# Patient Record
Sex: MALE | URBAN - METROPOLITAN AREA
[De-identification: names, ages, dates, MRNs, and addresses within clinical notes are randomized per-mention and may not be internally consistent; named-entity substitution may affect disease eponyms.]

---

## 2018-06-12 ENCOUNTER — INPATIENT (INPATIENT)
Facility: HOSPITAL | Age: 53
LOS: 2 days | Discharge: HOME | End: 2018-06-15
Attending: HOSPITALIST | Admitting: HOSPITALIST
Payer: COMMERCIAL

## 2018-06-12 VITALS
HEART RATE: 102 BPM | WEIGHT: 289.91 LBS | HEIGHT: 73 IN | SYSTOLIC BLOOD PRESSURE: 216 MMHG | OXYGEN SATURATION: 97 % | TEMPERATURE: 98 F | RESPIRATION RATE: 18 BRPM | DIASTOLIC BLOOD PRESSURE: 137 MMHG

## 2018-06-12 DIAGNOSIS — I21.4 NON-ST ELEVATION (NSTEMI) MYOCARDIAL INFARCTION: ICD-10-CM

## 2018-06-12 DIAGNOSIS — R06.02 SHORTNESS OF BREATH: ICD-10-CM

## 2018-06-12 DIAGNOSIS — I10 ESSENTIAL (PRIMARY) HYPERTENSION: ICD-10-CM

## 2018-06-12 DIAGNOSIS — J45.909 UNSPECIFIED ASTHMA, UNCOMPLICATED: ICD-10-CM

## 2018-06-12 LAB
ALBUMIN SERPL ELPH-MCNC: 3 G/DL — LOW (ref 3.3–5)
ALP SERPL-CCNC: 55 U/L — SIGNIFICANT CHANGE UP (ref 40–120)
ALT FLD-CCNC: 68 U/L — SIGNIFICANT CHANGE UP (ref 12–78)
ANION GAP SERPL CALC-SCNC: 7 MMOL/L — SIGNIFICANT CHANGE UP (ref 5–17)
ANISOCYTOSIS BLD QL: SLIGHT — SIGNIFICANT CHANGE UP
APTT BLD: 25.5 SEC — LOW (ref 27.5–37.4)
APTT BLD: 37.9 SEC — HIGH (ref 27.5–37.4)
AST SERPL-CCNC: 43 U/L — HIGH (ref 15–37)
BASOPHILS # BLD AUTO: 0 K/UL — SIGNIFICANT CHANGE UP (ref 0–0.2)
BASOPHILS NFR BLD AUTO: 0 % — SIGNIFICANT CHANGE UP (ref 0–2)
BILIRUB SERPL-MCNC: 1.1 MG/DL — SIGNIFICANT CHANGE UP (ref 0.2–1.2)
BUN SERPL-MCNC: 23 MG/DL — SIGNIFICANT CHANGE UP (ref 7–23)
CALCIUM SERPL-MCNC: 8.2 MG/DL — LOW (ref 8.5–10.1)
CHLORIDE SERPL-SCNC: 104 MMOL/L — SIGNIFICANT CHANGE UP (ref 96–108)
CK SERPL-CCNC: 177 U/L — SIGNIFICANT CHANGE UP (ref 26–308)
CO2 SERPL-SCNC: 26 MMOL/L — SIGNIFICANT CHANGE UP (ref 22–31)
CREAT SERPL-MCNC: 1.63 MG/DL — HIGH (ref 0.5–1.3)
DACRYOCYTES BLD QL SMEAR: SLIGHT — SIGNIFICANT CHANGE UP
EOSINOPHIL # BLD AUTO: 0 K/UL — SIGNIFICANT CHANGE UP (ref 0–0.5)
EOSINOPHIL NFR BLD AUTO: 0 % — SIGNIFICANT CHANGE UP (ref 0–6)
GLUCOSE BLDC GLUCOMTR-MCNC: 145 MG/DL — HIGH (ref 70–99)
GLUCOSE BLDC GLUCOMTR-MCNC: 162 MG/DL — HIGH (ref 70–99)
GLUCOSE BLDC GLUCOMTR-MCNC: 179 MG/DL — HIGH (ref 70–99)
GLUCOSE SERPL-MCNC: 123 MG/DL — HIGH (ref 70–99)
HBA1C BLD-MCNC: 6.4 % — HIGH (ref 4–5.6)
HCT VFR BLD CALC: 40 % — SIGNIFICANT CHANGE UP (ref 39–50)
HCT VFR BLD CALC: 40.4 % — SIGNIFICANT CHANGE UP (ref 39–50)
HGB BLD-MCNC: 13.3 G/DL — SIGNIFICANT CHANGE UP (ref 13–17)
HGB BLD-MCNC: 13.4 G/DL — SIGNIFICANT CHANGE UP (ref 13–17)
INR BLD: 1.09 RATIO — SIGNIFICANT CHANGE UP (ref 0.88–1.16)
LYMPHOCYTES # BLD AUTO: 0.85 K/UL — LOW (ref 1–3.3)
LYMPHOCYTES # BLD AUTO: 7 % — LOW (ref 13–44)
MANUAL SMEAR VERIFICATION: SIGNIFICANT CHANGE UP
MCHC RBC-ENTMCNC: 29.4 PG — SIGNIFICANT CHANGE UP (ref 27–34)
MCHC RBC-ENTMCNC: 29.6 PG — SIGNIFICANT CHANGE UP (ref 27–34)
MCHC RBC-ENTMCNC: 32.9 GM/DL — SIGNIFICANT CHANGE UP (ref 32–36)
MCHC RBC-ENTMCNC: 33.5 GM/DL — SIGNIFICANT CHANGE UP (ref 32–36)
MCV RBC AUTO: 88.5 FL — SIGNIFICANT CHANGE UP (ref 80–100)
MCV RBC AUTO: 89.2 FL — SIGNIFICANT CHANGE UP (ref 80–100)
MONOCYTES # BLD AUTO: 1.09 K/UL — HIGH (ref 0–0.9)
MONOCYTES NFR BLD AUTO: 9 % — SIGNIFICANT CHANGE UP (ref 2–14)
NEUTROPHILS # BLD AUTO: 10.03 K/UL — HIGH (ref 1.8–7.4)
NEUTROPHILS NFR BLD AUTO: 81 % — HIGH (ref 43–77)
NEUTS BAND # BLD: 2 % — SIGNIFICANT CHANGE UP (ref 0–8)
NRBC # BLD: 0 /100 WBCS — SIGNIFICANT CHANGE UP (ref 0–0)
NRBC # BLD: 0 /100 — SIGNIFICANT CHANGE UP (ref 0–0)
NRBC # BLD: SIGNIFICANT CHANGE UP /100 WBCS (ref 0–0)
NT-PROBNP SERPL-SCNC: HIGH PG/ML (ref 0–125)
PLAT MORPH BLD: NORMAL — SIGNIFICANT CHANGE UP
PLATELET # BLD AUTO: 242 K/UL — SIGNIFICANT CHANGE UP (ref 150–400)
PLATELET # BLD AUTO: 253 K/UL — SIGNIFICANT CHANGE UP (ref 150–400)
POIKILOCYTOSIS BLD QL AUTO: SLIGHT — SIGNIFICANT CHANGE UP
POLYCHROMASIA BLD QL SMEAR: SLIGHT — SIGNIFICANT CHANGE UP
POTASSIUM SERPL-MCNC: 3.8 MMOL/L — SIGNIFICANT CHANGE UP (ref 3.5–5.3)
POTASSIUM SERPL-SCNC: 3.8 MMOL/L — SIGNIFICANT CHANGE UP (ref 3.5–5.3)
PROT SERPL-MCNC: 6.3 GM/DL — SIGNIFICANT CHANGE UP (ref 6–8.3)
PROTHROM AB SERPL-ACNC: 11.8 SEC — SIGNIFICANT CHANGE UP (ref 9.8–12.7)
RBC # BLD: 4.52 M/UL — SIGNIFICANT CHANGE UP (ref 4.2–5.8)
RBC # BLD: 4.53 M/UL — SIGNIFICANT CHANGE UP (ref 4.2–5.8)
RBC # FLD: 14.6 % — HIGH (ref 10.3–14.5)
RBC # FLD: 14.7 % — HIGH (ref 10.3–14.5)
RBC BLD AUTO: ABNORMAL
SODIUM SERPL-SCNC: 137 MMOL/L — SIGNIFICANT CHANGE UP (ref 135–145)
TROPONIN I SERPL-MCNC: 0.08 NG/ML — HIGH (ref 0.01–0.04)
TROPONIN I SERPL-MCNC: 0.08 NG/ML — HIGH (ref 0.01–0.04)
TROPONIN I SERPL-MCNC: 0.12 NG/ML — HIGH (ref 0.01–0.04)
TROPONIN I SERPL-MCNC: 0.17 NG/ML — HIGH (ref 0.01–0.04)
VARIANT LYMPHS # BLD: 1 % — SIGNIFICANT CHANGE UP (ref 0–6)
WBC # BLD: 12.09 K/UL — HIGH (ref 3.8–10.5)
WBC # BLD: 8.04 K/UL — SIGNIFICANT CHANGE UP (ref 3.8–10.5)
WBC # FLD AUTO: 12.09 K/UL — HIGH (ref 3.8–10.5)
WBC # FLD AUTO: 8.04 K/UL — SIGNIFICANT CHANGE UP (ref 3.8–10.5)

## 2018-06-12 PROCEDURE — 93306 TTE W/DOPPLER COMPLETE: CPT | Mod: 26

## 2018-06-12 PROCEDURE — 71045 X-RAY EXAM CHEST 1 VIEW: CPT | Mod: 26

## 2018-06-12 PROCEDURE — 99285 EMERGENCY DEPT VISIT HI MDM: CPT

## 2018-06-12 PROCEDURE — 93010 ELECTROCARDIOGRAM REPORT: CPT | Mod: 76

## 2018-06-12 PROCEDURE — 99223 1ST HOSP IP/OBS HIGH 75: CPT

## 2018-06-12 RX ORDER — DEXTROSE 50 % IN WATER 50 %
25 SYRINGE (ML) INTRAVENOUS ONCE
Qty: 0 | Refills: 0 | Status: DISCONTINUED | OUTPATIENT
Start: 2018-06-12 | End: 2018-06-15

## 2018-06-12 RX ORDER — FUROSEMIDE 40 MG
20 TABLET ORAL DAILY
Qty: 0 | Refills: 0 | Status: DISCONTINUED | OUTPATIENT
Start: 2018-06-12 | End: 2018-06-12

## 2018-06-12 RX ORDER — SODIUM CHLORIDE 9 MG/ML
3 INJECTION INTRAMUSCULAR; INTRAVENOUS; SUBCUTANEOUS ONCE
Qty: 0 | Refills: 0 | Status: COMPLETED | OUTPATIENT
Start: 2018-06-12 | End: 2018-06-12

## 2018-06-12 RX ORDER — NITROGLYCERIN 6.5 MG
2 CAPSULE, EXTENDED RELEASE ORAL
Qty: 0 | Refills: 0 | Status: DISCONTINUED | OUTPATIENT
Start: 2018-06-12 | End: 2018-06-15

## 2018-06-12 RX ORDER — INSULIN LISPRO 100/ML
VIAL (ML) SUBCUTANEOUS
Qty: 0 | Refills: 0 | Status: DISCONTINUED | OUTPATIENT
Start: 2018-06-12 | End: 2018-06-15

## 2018-06-12 RX ORDER — HEPARIN SODIUM 5000 [USP'U]/ML
INJECTION INTRAVENOUS; SUBCUTANEOUS
Qty: 25000 | Refills: 0 | Status: DISCONTINUED | OUTPATIENT
Start: 2018-06-12 | End: 2018-06-13

## 2018-06-12 RX ORDER — IPRATROPIUM/ALBUTEROL SULFATE 18-103MCG
3 AEROSOL WITH ADAPTER (GRAM) INHALATION EVERY 6 HOURS
Qty: 0 | Refills: 0 | Status: DISCONTINUED | OUTPATIENT
Start: 2018-06-12 | End: 2018-06-15

## 2018-06-12 RX ORDER — DEXTROSE 50 % IN WATER 50 %
12.5 SYRINGE (ML) INTRAVENOUS ONCE
Qty: 0 | Refills: 0 | Status: DISCONTINUED | OUTPATIENT
Start: 2018-06-12 | End: 2018-06-15

## 2018-06-12 RX ORDER — SODIUM CHLORIDE 9 MG/ML
1000 INJECTION, SOLUTION INTRAVENOUS
Qty: 0 | Refills: 0 | Status: DISCONTINUED | OUTPATIENT
Start: 2018-06-12 | End: 2018-06-15

## 2018-06-12 RX ORDER — HEPARIN SODIUM 5000 [USP'U]/ML
6000 INJECTION INTRAVENOUS; SUBCUTANEOUS EVERY 6 HOURS
Qty: 0 | Refills: 0 | Status: DISCONTINUED | OUTPATIENT
Start: 2018-06-12 | End: 2018-06-14

## 2018-06-12 RX ORDER — MONTELUKAST 4 MG/1
10 TABLET, CHEWABLE ORAL AT BEDTIME
Qty: 0 | Refills: 0 | Status: DISCONTINUED | OUTPATIENT
Start: 2018-06-12 | End: 2018-06-15

## 2018-06-12 RX ORDER — LORATADINE 10 MG/1
10 TABLET ORAL DAILY
Qty: 0 | Refills: 0 | Status: DISCONTINUED | OUTPATIENT
Start: 2018-06-12 | End: 2018-06-14

## 2018-06-12 RX ORDER — FUROSEMIDE 40 MG
40 TABLET ORAL EVERY 12 HOURS
Qty: 0 | Refills: 0 | Status: DISCONTINUED | OUTPATIENT
Start: 2018-06-12 | End: 2018-06-13

## 2018-06-12 RX ORDER — ACETAMINOPHEN 500 MG
650 TABLET ORAL EVERY 6 HOURS
Qty: 0 | Refills: 0 | Status: DISCONTINUED | OUTPATIENT
Start: 2018-06-12 | End: 2018-06-15

## 2018-06-12 RX ORDER — FUROSEMIDE 40 MG
20 TABLET ORAL ONCE
Qty: 0 | Refills: 0 | Status: DISCONTINUED | OUTPATIENT
Start: 2018-06-12 | End: 2018-06-12

## 2018-06-12 RX ORDER — HYDRALAZINE HCL 50 MG
10 TABLET ORAL EVERY 8 HOURS
Qty: 0 | Refills: 0 | Status: DISCONTINUED | OUTPATIENT
Start: 2018-06-12 | End: 2018-06-12

## 2018-06-12 RX ORDER — GLUCAGON INJECTION, SOLUTION 0.5 MG/.1ML
1 INJECTION, SOLUTION SUBCUTANEOUS ONCE
Qty: 0 | Refills: 0 | Status: DISCONTINUED | OUTPATIENT
Start: 2018-06-12 | End: 2018-06-15

## 2018-06-12 RX ORDER — HEPARIN SODIUM 5000 [USP'U]/ML
5000 INJECTION INTRAVENOUS; SUBCUTANEOUS ONCE
Qty: 0 | Refills: 0 | Status: COMPLETED | OUTPATIENT
Start: 2018-06-12 | End: 2018-06-12

## 2018-06-12 RX ORDER — AMLODIPINE BESYLATE 2.5 MG/1
5 TABLET ORAL ONCE
Qty: 0 | Refills: 0 | Status: COMPLETED | OUTPATIENT
Start: 2018-06-12 | End: 2018-06-12

## 2018-06-12 RX ORDER — CLOPIDOGREL BISULFATE 75 MG/1
75 TABLET, FILM COATED ORAL DAILY
Qty: 0 | Refills: 0 | Status: DISCONTINUED | OUTPATIENT
Start: 2018-06-13 | End: 2018-06-13

## 2018-06-12 RX ORDER — ASPIRIN/CALCIUM CARB/MAGNESIUM 324 MG
325 TABLET ORAL ONCE
Qty: 0 | Refills: 0 | Status: COMPLETED | OUTPATIENT
Start: 2018-06-12 | End: 2018-06-12

## 2018-06-12 RX ORDER — ASPIRIN/CALCIUM CARB/MAGNESIUM 324 MG
81 TABLET ORAL DAILY
Qty: 0 | Refills: 0 | Status: DISCONTINUED | OUTPATIENT
Start: 2018-06-13 | End: 2018-06-15

## 2018-06-12 RX ORDER — IPRATROPIUM/ALBUTEROL SULFATE 18-103MCG
3 AEROSOL WITH ADAPTER (GRAM) INHALATION ONCE
Qty: 0 | Refills: 0 | Status: COMPLETED | OUTPATIENT
Start: 2018-06-12 | End: 2018-06-12

## 2018-06-12 RX ORDER — ALBUTEROL 90 UG/1
1 AEROSOL, METERED ORAL EVERY 4 HOURS
Qty: 0 | Refills: 0 | Status: COMPLETED | OUTPATIENT
Start: 2018-06-12 | End: 2019-05-11

## 2018-06-12 RX ORDER — TIOTROPIUM BROMIDE 18 UG/1
1 CAPSULE ORAL; RESPIRATORY (INHALATION) DAILY
Qty: 0 | Refills: 0 | Status: DISCONTINUED | OUTPATIENT
Start: 2018-06-12 | End: 2018-06-15

## 2018-06-12 RX ORDER — HYDRALAZINE HCL 50 MG
50 TABLET ORAL EVERY 8 HOURS
Qty: 0 | Refills: 0 | Status: DISCONTINUED | OUTPATIENT
Start: 2018-06-12 | End: 2018-06-13

## 2018-06-12 RX ORDER — AMLODIPINE BESYLATE 2.5 MG/1
5 TABLET ORAL DAILY
Qty: 0 | Refills: 0 | Status: DISCONTINUED | OUTPATIENT
Start: 2018-06-12 | End: 2018-06-13

## 2018-06-12 RX ORDER — HYDRALAZINE HCL 50 MG
25 TABLET ORAL EVERY 8 HOURS
Qty: 0 | Refills: 0 | Status: DISCONTINUED | OUTPATIENT
Start: 2018-06-12 | End: 2018-06-12

## 2018-06-12 RX ORDER — ENOXAPARIN SODIUM 100 MG/ML
40 INJECTION SUBCUTANEOUS EVERY 24 HOURS
Qty: 0 | Refills: 0 | Status: DISCONTINUED | OUTPATIENT
Start: 2018-06-12 | End: 2018-06-12

## 2018-06-12 RX ORDER — DEXTROSE 50 % IN WATER 50 %
15 SYRINGE (ML) INTRAVENOUS ONCE
Qty: 0 | Refills: 0 | Status: DISCONTINUED | OUTPATIENT
Start: 2018-06-12 | End: 2018-06-15

## 2018-06-12 RX ORDER — CLOPIDOGREL BISULFATE 75 MG/1
300 TABLET, FILM COATED ORAL ONCE
Qty: 0 | Refills: 0 | Status: COMPLETED | OUTPATIENT
Start: 2018-06-12 | End: 2018-06-12

## 2018-06-12 RX ORDER — ISOSORBIDE MONONITRATE 60 MG/1
30 TABLET, EXTENDED RELEASE ORAL AT BEDTIME
Qty: 0 | Refills: 0 | Status: DISCONTINUED | OUTPATIENT
Start: 2018-06-12 | End: 2018-06-13

## 2018-06-12 RX ORDER — HYDRALAZINE HCL 50 MG
10 TABLET ORAL ONCE
Qty: 0 | Refills: 0 | Status: COMPLETED | OUTPATIENT
Start: 2018-06-12 | End: 2018-06-12

## 2018-06-12 RX ADMIN — Medication 125 MILLIGRAM(S): at 09:14

## 2018-06-12 RX ADMIN — ENOXAPARIN SODIUM 40 MILLIGRAM(S): 100 INJECTION SUBCUTANEOUS at 11:34

## 2018-06-12 RX ADMIN — LORATADINE 10 MILLIGRAM(S): 10 TABLET ORAL at 12:02

## 2018-06-12 RX ADMIN — Medication 10 MILLIGRAM(S): at 16:52

## 2018-06-12 RX ADMIN — Medication 2 INCH(S): at 17:39

## 2018-06-12 RX ADMIN — HEPARIN SODIUM 1300 UNIT(S)/HR: 5000 INJECTION INTRAVENOUS; SUBCUTANEOUS at 21:26

## 2018-06-12 RX ADMIN — Medication 3 MILLILITER(S): at 20:22

## 2018-06-12 RX ADMIN — CLOPIDOGREL BISULFATE 300 MILLIGRAM(S): 75 TABLET, FILM COATED ORAL at 15:46

## 2018-06-12 RX ADMIN — HEPARIN SODIUM 1000 UNIT(S)/HR: 5000 INJECTION INTRAVENOUS; SUBCUTANEOUS at 14:13

## 2018-06-12 RX ADMIN — HEPARIN SODIUM 6000 UNIT(S): 5000 INJECTION INTRAVENOUS; SUBCUTANEOUS at 21:28

## 2018-06-12 RX ADMIN — Medication 50 MILLIGRAM(S): at 15:46

## 2018-06-12 RX ADMIN — Medication 25 MILLIGRAM(S): at 12:37

## 2018-06-12 RX ADMIN — Medication 3 MILLILITER(S): at 11:29

## 2018-06-12 RX ADMIN — Medication 325 MILLIGRAM(S): at 12:36

## 2018-06-12 RX ADMIN — HEPARIN SODIUM 5000 UNIT(S): 5000 INJECTION INTRAVENOUS; SUBCUTANEOUS at 14:14

## 2018-06-12 RX ADMIN — MONTELUKAST 10 MILLIGRAM(S): 4 TABLET, CHEWABLE ORAL at 21:25

## 2018-06-12 RX ADMIN — Medication 50 MILLIGRAM(S): at 21:25

## 2018-06-12 RX ADMIN — Medication 2 INCH(S): at 23:19

## 2018-06-12 RX ADMIN — Medication 3 MILLILITER(S): at 09:14

## 2018-06-12 RX ADMIN — Medication 1: at 17:40

## 2018-06-12 RX ADMIN — AMLODIPINE BESYLATE 5 MILLIGRAM(S): 2.5 TABLET ORAL at 17:40

## 2018-06-12 RX ADMIN — AMLODIPINE BESYLATE 5 MILLIGRAM(S): 2.5 TABLET ORAL at 12:36

## 2018-06-12 RX ADMIN — ISOSORBIDE MONONITRATE 30 MILLIGRAM(S): 60 TABLET, EXTENDED RELEASE ORAL at 21:25

## 2018-06-12 RX ADMIN — Medication 1: at 12:01

## 2018-06-12 RX ADMIN — SODIUM CHLORIDE 3 MILLILITER(S): 9 INJECTION INTRAMUSCULAR; INTRAVENOUS; SUBCUTANEOUS at 09:14

## 2018-06-12 RX ADMIN — Medication 2 INCH(S): at 23:40

## 2018-06-12 RX ADMIN — Medication 40 MILLIGRAM(S): at 12:34

## 2018-06-12 RX ADMIN — Medication 2 INCH(S): at 11:40

## 2018-06-12 NOTE — CONSULT NOTE ADULT - PROBLEM SELECTOR RECOMMENDATION 3
Ischemia assessment is warranted, but method not clear at this time as has evidence of renal dysfunction and contrast can affect this especially in the setting of being treated for CHF with diuretics.

## 2018-06-12 NOTE — CONSULT NOTE ADULT - SUBJECTIVE AND OBJECTIVE BOX
NEPHROLOGY CONSULT  HPI:  53 y/o male with pmh of asthma and having exarcerbation lately more and has finished several rounds of prednisone taper, last one finished 2 days ago. He started feeling tightness of his chest yesterday, gradually was getting worse, no aggravating factor, not relieved by taking his usual meds, associated with persistens dry cough. Also mentioned he could not lye flat yesterday due to shortness of breath.  Pt states has been to Dr Pablo gould 2005, placed on antihypertensives, did not feels well, stopped them.  seen by Dr Winifred Christina in the past, was on meds, insurance changed , did not follow up, seen by allergy/ pulmonology, who has been urging him to see an internist for a long time.   No knowledge of any renal issues  found to have mild elevated troponin elevation and BP greater than 200, 100, creat 1.63  for cardiac cath in am, risks of cath explained to pt, his wife and daughter in the room        PAST MEDICAL & SURGICAL HISTORY:  Asthma  No significant past surgical history      FAMILY HISTORY:  Family history of melanoma (Mother)      MEDICATIONS  (STANDING):  ALBUTerol    90 MICROgram(s) HFA Inhaler 1 Puff(s) Inhalation every 4 hours  ALBUTerol/ipratropium for Nebulization 3 milliLiter(s) Nebulizer every 6 hours  amLODIPine   Tablet 5 milliGRAM(s) Oral daily  clopidogrel Tablet 300 milliGRAM(s) Oral once  dextrose 5%. 1000 milliLiter(s) (50 mL/Hr) IV Continuous <Continuous>  dextrose 50% Injectable 12.5 Gram(s) IV Push once  dextrose 50% Injectable 25 Gram(s) IV Push once  dextrose 50% Injectable 25 Gram(s) IV Push once  furosemide   Injectable 40 milliGRAM(s) IV Push every 12 hours  heparin  Infusion.  Unit(s)/Hr (10 mL/Hr) IV Continuous <Continuous>  heparin  Injectable 5000 Unit(s) IV Push once  hydrALAZINE 25 milliGRAM(s) Oral every 8 hours  insulin lispro (HumaLOG) corrective regimen sliding scale   SubCutaneous three times a day before meals  loratadine 10 milliGRAM(s) Oral daily  montelukast 10 milliGRAM(s) Oral at bedtime  nitroglycerin    2% Ointment 2 Inch(s) Transdermal four times a day  tiotropium 18 MICROgram(s) Capsule 1 Capsule(s) Inhalation daily    MEDICATIONS  (PRN):  dextrose 40% Gel 15 Gram(s) Oral once PRN Blood Glucose LESS THAN 70 milliGRAM(s)/deciliter  glucagon  Injectable 1 milliGRAM(s) IntraMuscular once PRN Glucose LESS THAN 70 milligrams/deciliter  heparin  Injectable 6000 Unit(s) IV Push every 6 hours PRN For aPTT less than 40      Allergies    No Known Allergies    Intolerances        I&O's Summary        REVIEW OF SYSTEMS:    CONSTITUTIONAL:  As per HPI.  CONSTITUTIONAL: No weakness, fevers or chills  EYES/ENT: No visual changes;  No vertigo or throat pain   NECK: No pain or stiffness  CARDIOVASCULAR: + sob  GASTROINTESTINAL: No abdominal or epigastric pain. No nausea, vomiting, or hematemesis; No diarrhea or constipation. No melena or hematochezia.  GENITOURINARY: No dysuria, frequency or hematuria, + nocturia 2 x a night  NEUROLOGICAL: No numbness or weakness  SKIN: No itching, burning, rashes, or lesions   All other review of systems is negative unless indicated above      Vital Signs Last 24 Hrs  T(C): 36.5 (12 Jun 2018 08:22), Max: 36.5 (12 Jun 2018 08:22)  T(F): 97.7 (12 Jun 2018 08:22), Max: 97.7 (12 Jun 2018 08:22)  HR: 102 (12 Jun 2018 08:22) (102 - 102)  BP: 216/137 (12 Jun 2018 08:22) (216/137 - 216/137)  BP(mean): --  RR: 18 (12 Jun 2018 08:22) (18 - 18)  SpO2: 97% (12 Jun 2018 08:22) (97% - 97%)  Daily Height in cm: 185.42 (12 Jun 2018 08:22)    Daily   I&O's Summary      PHYSICAL EXAM:    General:  Alert, well-developed ,No acute distress.    Neuro:  Alert and oriented to person, place, and time. Able to communicate  well. Cranial nerves 2-12 grossly intact. 5/5 strength in all  extremities bilaterally. Sensation intact in all extremities.  Appropriate affect.     HEENT:  No JVD, no masses, Eyes anicteric, No carotid bruits.No lymphadenopathy,    Cardiovascular:  Regular rate and rhythm, with normal S1 and S2. No murmurs, rubs,  or gallops. No JVD.   Loud S2    Lungs:  clear. no rales, no wheezing, .    Abdomen:  Normoactive bowel sounds. Soft, flat, non-tender, and non-distended.  No hepatosplenomegaly, positive bowel sounds, Obese    Skin:  Warm, dry, well-perfused. No rashes or other lesions.     Extremities:  2+ pulses in upper and lower extremities. No lower extremity pain or  edema; legs are symmetric in appearance.    LABS:                        13.4   12.09 )-----------( 253      ( 12 Jun 2018 08:58 )             40.0     06-12    137  |  104  |  23  ----------------------------<  123<H>  3.8   |  26  |  1.63<H>    Ca    8.2<L>      12 Jun 2018 08:58    TPro  6.3  /  Alb  3.0<L>  /  TBili  1.1  /  DBili  x   /  AST  43<H>  /  ALT  68  /  AlkPhos  55  06-12    PT/INR - ( 12 Jun 2018 08:58 )   PT: 11.8 sec;   INR: 1.09 ratio         PTT - ( 12 Jun 2018 08:58 )  PTT:25.5 sec

## 2018-06-12 NOTE — CHART NOTE - NSCHARTNOTEFT_GEN_A_CORE
pt echo showed low ef-  on exam he does not appears to have asthma exacerbation -d/c iv steroids and monitor it without it, ct inhaled steroids only. start and resume if pt develops exacerbation

## 2018-06-12 NOTE — H&P ADULT - NSHPPHYSICALEXAM_GEN_ALL_CORE
Vital Signs Last 24 Hrs  T(C): 36.5 (12 Jun 2018 08:22), Max: 36.5 (12 Jun 2018 08:22)  T(F): 97.7 (12 Jun 2018 08:22), Max: 97.7 (12 Jun 2018 08:22)  HR: 102 (12 Jun 2018 08:22) (102 - 102)  BP: 216/137 (12 Jun 2018 08:22) (216/137 - 216/137)  BP(mean): --  RR: 18 (12 Jun 2018 08:22) (18 - 18)  SpO2: 97% (12 Jun 2018 08:22) (97% - 97%)    General- appears comfortable on oxygen  Head- Atraumatic, normocephalic   Neurology: A&Ox3, nonfocal, CN II to XII intact, power intact 5/5 in all muscle group  HEENT- PERRLA, moist muscous membrane  Neck-supple, no JVD  Respiratory: Air entry equal b/l, CTA   CVS:  S1S2, no murmurs, rubs or gallops  Abdominal: Soft, NT, ND +BS,   Genitourinary- voiding, non palpable bladder  Extremities: edema present 1+, + peripheral pulses  Skin- no rash, no ulcer  Psychiatric- mood stable   LN- no lymphadenopathy

## 2018-06-12 NOTE — CONSULT NOTE ADULT - SUBJECTIVE AND OBJECTIVE BOX
HPI: 52 year old male with hx of asthma for 30 yrs, who presents with worsening SOB and chest tightness with short exertion and occasional at rest, Has attributed his symptoms to his asthma.  He currently is wheezing and yet is still SOB.  He reports having Pneumonia back in January and was treated by his asthma MD (Dr. Calix) for this and has not greatly improved. Reports multiple course of steroids since then. He has been a smoker for many years, but has not since February as his asthma bothers him.  Reports 2-3 pillow orthopnea, No PND, gets chest tightness at random times (rest or exertion).  Has been told he had high blood pressure in the past and was treated but then stopped the medication as it did not agree with him.  Reports high triglycerides in the past on a physical but not on treatment.  Does report snoring, but wife states no witness apneas.  Denies hx. of DM.    PAST MEDICAL & SURGICAL HISTORY:  Asthma  Psoriasis  No significant past surgical history      SOCIAL HISTORY: Current intermitent Smoker/Social ETOH/ No Ilicit Drug use./ works as a project supervisor.    FAMILY HISTORY:  Family history of melanoma (Mother)  No CAD in family hx.    Allergies  No Known Allergies    Home Medications:  albuterol/singulair/prednisone    REVIEW OF SYSTEMS: 13 systems were reviewed and all negative except for comments above.    Vital Signs Last 24 Hrs  T(C): 36.5 (12 Jun 2018 08:22), Max: 36.5 (12 Jun 2018 08:22)  T(F): 97.7 (12 Jun 2018 08:22), Max: 97.7 (12 Jun 2018 08:22)  HR: 102 (12 Jun 2018 08:22) (102 - 102)  BP: 216/137 (12 Jun 2018 08:22) (216/137 - 216/137)  BP(mean): --  RR: 18 (12 Jun 2018 08:22) (18 - 18)  SpO2: 97% (12 Jun 2018 08:22) (97% - 97%)Daily Height in cm: 185.42 (12 Jun 2018 08:22)    Daily I&O's Summary    PHYSICAL EXAM:  Constitutional: Mildly tachypneic, awake and alert, well-developed, sitting upright in stretcher in ER as cannot lie down  HEENT: PERRLA, EOMI,  No oral cyanosis. Oropharynx Clean and Dry.  Neck:  supple, JVP 5-6, No Thyroid enlargement. No Carotid Bruits bilaterally.  Respiratory: Breath sounds slightly ronchurus, No wheezing, scant rales and rhonchi at bases.  Cardiovascular: NL S1 and S2, RRR, +s4, 1-2/6 REINALDO LLSB  Gastrointestinal: Bowel Sounds present, soft, obese  Extremities: No peripheral edema. No clubbing or cyanosis.    Vascular: 2+ peripheral pulses in LE   Neurological: A/O x 3, no focal motor deficits  Musculoskeletal: no calf tenderness.  Skin: No rashes.      LABS: All Labs Reviewed:                        13.4   12.09 )-----------( 253      ( 12 Jun 2018 08:58 )             40.0     12 Jun 2018 08:58    137    |  104    |  23     ----------------------------<  123    3.8     |  26     |  1.63     Ca    8.2        12 Jun 2018 08:58    TPro  6.3    /  Alb  3.0    /  TBili  1.1    /  DBili  x      /  AST  43     /  ALT  68     /  AlkPhos  55     12 Jun 2018 08:58    PT/INR - ( 12 Jun 2018 08:58 )   PT: 11.8 sec;   INR: 1.09 ratio       PTT - ( 12 Jun 2018 08:58 )  PTT:25.5 sec  CARDIAC MARKERS ( 12 Jun 2018 08:58 )  0.168 ng/mL / x     / x     / x     / x        06-12 @ 08:58  Pro Bnp [id number]      RADIOLOGY:    < from: Xray Chest 1 View AP/PA. (06.12.18 @ 09:57) >  PROCEDURE DATE:  06/12/2018          INTERPRETATION:  Chest portable semierect single AP view:    Clinical history:    Shortness of breath. Chest pain. History of Fosamax marked.    Findings:    No prior radiographs available for comparison.    Cardiac size appears normal. Aorta is within normal limits. Lungs show   diffuse bilateral interstitial and airspace increased densities. Etiology   uncertain. May represent a diffuse lung disease.    If clinically indicated, if clinically warranted, further evaluation by    CT scan of the higher resolution low-dose chest recommended.    Impression:    Diffuse interstitial and airspace disease. Etiology uncertain. Further   evaluation requested.    < end of copied text >    EKG: NSR, LAE, LVH with repolarization abnormality (ST depressions laterally) (cannot r/o ischemia)

## 2018-06-12 NOTE — ED ADULT TRIAGE NOTE - CHIEF COMPLAINT QUOTE
pt notes increasing diff breathing/asthma exacerbation for the past 6 months, worked up by PMD, told he his chest xray is abnormal. mild dyspnea noted at rest.

## 2018-06-12 NOTE — H&P ADULT - NSHPLABSRESULTS_GEN_ALL_CORE
13.4   12.09 )-----------( 253      ( 12 Jun 2018 08:58 )             40.0     06-12    137  |  104  |  23  ----------------------------<  123<H>  3.8   |  26  |  1.63<H>    Ca    8.2<L>      12 Jun 2018 08:58    TPro  6.3  /  Alb  3.0<L>  /  TBili  1.1  /  DBili  x   /  AST  43<H>  /  ALT  68  /  AlkPhos  55  06-12    LIVER FUNCTIONS - ( 12 Jun 2018 08:58 )  Alb: 3.0 g/dL / Pro: 6.3 gm/dL / ALK PHOS: 55 U/L / ALT: 68 U/L / AST: 43 U/L / GGT: x             PT/INR - ( 12 Jun 2018 08:58 )   PT: 11.8 sec;   INR: 1.09 ratio         PTT - ( 12 Jun 2018 08:58 )  PTT:25.5 sec      PT/INR - ( 12 Jun 2018 08:58 )   PT: 11.8 sec;   INR: 1.09 ratio         PTT - ( 12 Jun 2018 08:58 )  PTT:25.5 sec  CAPILLARY BLOOD GLUCOSE      #EKG- Sinus tachycardia

## 2018-06-12 NOTE — H&P ADULT - HISTORY OF PRESENT ILLNESS
51 y/o male with pmh of asthma and having exarcerbation lately more and has finished several rounds of prednisone taper, last one finished 2 days ago. He started feeling tightness of his chest yesterday, gradually was getting worse, no aggravating factor, not relieved by taking his usual meds, associated with persistens dry cough. Also mentioned he could not lye flat yesterday due to shortness of breath  -no fever, no runny nose

## 2018-06-12 NOTE — CONSULT NOTE ADULT - PROBLEM SELECTOR RECOMMENDATION 4
Will initiate treatment with diuretic and norvasc as BB are contraindicated in asthma and ACE/ARB might worsen kidney function in this setting.  Will reassess this after echo results know.  Will use nitrates short term to bring down BP and reduce preload.

## 2018-06-12 NOTE — CHART NOTE - NSCHARTNOTEFT_GEN_A_CORE
prelimnary review of echo shows LV Dysfunction with EF 40% and anterior and anterolateral and apical hypokinesis without thinning.  This speaks for his asthma symptoms likely being ischemic.  Will start aspirin, plavix and full dose heparin and place cath tomorrow.  Will have renal consult to optimize renal issues prior to angiography.  D/w Renal and Dr. Curran.

## 2018-06-12 NOTE — H&P ADULT - ASSESSMENT
A/P    #Shortness of breath- appears combination of Acute exacerbation of asthma plus contributed by possible acute decompensated diastolic heart failure   -admit, iv solumedrol, bronchodilator, oxygen support, gentle lasix and monitor his kidney function closely, cardiology evaluation, TTE     #? ARF- monitor his kidney function closely     #Acute decompensated diastolic heart failure-due to hypertensive urgency   -gently iv lasix one dose-monitor his kidney function test closely   -tight control of his bp, KATHY to follow     #Elevated glucose - ISS, HgA1C to follow     #Hypertensive urgency- monitor it, most likely due to shortness of breath     #Positive troponin- monitor it, Dr. Ruiz evaluation to follow, possible cath in near future     #DVT pr- lovenox     #Above discussed with pt and all questions have been answered

## 2018-06-12 NOTE — CONSULT NOTE ADULT - ASSESSMENT
51 y/o male with pmh of asthma and having exarcerbation lately more and has finished several rounds of prednisone taper, last one finished 2 days ago. He started feeling tightness of his chest yesterday, gradually was getting worse, no aggravating factor, not relieved by taking his usual meds, associated with persistens dry cough. Also mentioned he could not lye flat yesterday due to shortness of breath.  Pt states has been to Dr Pablo gould 2005, placed on antihypertensives, did not feels well, stopped them.  seen by Dr Winifred Christina in the past, was on meds, insurance changed , did not follow up, seen by allergy/ pulmonology, who has been urging him to see an internist for a long time.   No knowledge of any renal issues  found to have mild elevated troponin elevation and BP greater than 200, 100, creat 1.63  for cardiac cath in am, risks of cath explained to pt, his wife and daughter in the room    A/P  HTN  Morbid Obesity  Most likely CKD III due to above  CAD?  For cardiac cath in am, risk benefit d/w pt  IV hydration 150 ml / hr start prior to cath  increase hydralazine to 50 mg q 8   renal sono  arb as outpt for renal protection  UA  pth

## 2018-06-12 NOTE — CONSULT NOTE ADULT - PROBLEM SELECTOR RECOMMENDATION 2
Multifactorial but a large component may be from cardiac issues uncontrolled HTN, ? ischemia, ?CHF (etiology unclear (systolic vs. diastolic)), and asthma.  Will need further cardiac evaluation with echocardiography and then ischemia assessment of which method will be dictated by echo results.

## 2018-06-12 NOTE — CONSULT NOTE ADULT - PROBLEM SELECTOR RECOMMENDATION 9
Unclear if this has a component of cardiac asthma on top of regular asthma/COPD. Treatment for his asthma has been initiated by medicine.  Olga Lidia monitor and evaluate further for the cardiac issues.

## 2018-06-13 DIAGNOSIS — I11.0 HYPERTENSIVE HEART DISEASE WITH HEART FAILURE: ICD-10-CM

## 2018-06-13 LAB
ALBUMIN SERPL ELPH-MCNC: 3 G/DL — LOW (ref 3.3–5)
ANION GAP SERPL CALC-SCNC: 5 MMOL/L — SIGNIFICANT CHANGE UP (ref 5–17)
APTT BLD: 30.3 SEC — SIGNIFICANT CHANGE UP (ref 27.5–37.4)
APTT BLD: 53 SEC — HIGH (ref 27.5–37.4)
BUN SERPL-MCNC: 27 MG/DL — HIGH (ref 7–23)
CALCIUM SERPL-MCNC: 8.5 MG/DL — SIGNIFICANT CHANGE UP (ref 8.4–10.5)
CALCIUM SERPL-MCNC: 8.6 MG/DL — SIGNIFICANT CHANGE UP (ref 8.5–10.1)
CHLORIDE SERPL-SCNC: 103 MMOL/L — SIGNIFICANT CHANGE UP (ref 96–108)
CHOLEST SERPL-MCNC: 171 MG/DL — SIGNIFICANT CHANGE UP (ref 10–199)
CK SERPL-CCNC: 153 U/L — SIGNIFICANT CHANGE UP (ref 26–308)
CK SERPL-CCNC: 164 U/L — SIGNIFICANT CHANGE UP (ref 26–308)
CO2 SERPL-SCNC: 31 MMOL/L — SIGNIFICANT CHANGE UP (ref 22–31)
CREAT SERPL-MCNC: 1.52 MG/DL — HIGH (ref 0.5–1.3)
GLUCOSE BLDC GLUCOMTR-MCNC: 108 MG/DL — HIGH (ref 70–99)
GLUCOSE BLDC GLUCOMTR-MCNC: 117 MG/DL — HIGH (ref 70–99)
GLUCOSE BLDC GLUCOMTR-MCNC: 123 MG/DL — HIGH (ref 70–99)
GLUCOSE BLDC GLUCOMTR-MCNC: 328 MG/DL — HIGH (ref 70–99)
GLUCOSE SERPL-MCNC: 136 MG/DL — HIGH (ref 70–99)
HBA1C BLD-MCNC: 6.2 % — HIGH (ref 4–5.6)
HCT VFR BLD CALC: 39.8 % — SIGNIFICANT CHANGE UP (ref 39–50)
HCT VFR BLD CALC: 42.8 % — SIGNIFICANT CHANGE UP (ref 39–50)
HDLC SERPL-MCNC: 41 MG/DL — SIGNIFICANT CHANGE UP (ref 40–125)
HGB BLD-MCNC: 13 G/DL — SIGNIFICANT CHANGE UP (ref 13–17)
HGB BLD-MCNC: 14 G/DL — SIGNIFICANT CHANGE UP (ref 13–17)
LIPID PNL WITH DIRECT LDL SERPL: 111 MG/DL — SIGNIFICANT CHANGE UP
MCHC RBC-ENTMCNC: 28.7 PG — SIGNIFICANT CHANGE UP (ref 27–34)
MCHC RBC-ENTMCNC: 29.3 PG — SIGNIFICANT CHANGE UP (ref 27–34)
MCHC RBC-ENTMCNC: 32.7 GM/DL — SIGNIFICANT CHANGE UP (ref 32–36)
MCHC RBC-ENTMCNC: 32.7 GM/DL — SIGNIFICANT CHANGE UP (ref 32–36)
MCV RBC AUTO: 87.7 FL — SIGNIFICANT CHANGE UP (ref 80–100)
MCV RBC AUTO: 89.6 FL — SIGNIFICANT CHANGE UP (ref 80–100)
NRBC # BLD: 0 /100 WBCS — SIGNIFICANT CHANGE UP (ref 0–0)
NRBC # BLD: 0 /100 WBCS — SIGNIFICANT CHANGE UP (ref 0–0)
PHOSPHATE SERPL-MCNC: 4 MG/DL — SIGNIFICANT CHANGE UP (ref 2.5–4.5)
PLATELET # BLD AUTO: 266 K/UL — SIGNIFICANT CHANGE UP (ref 150–400)
PLATELET # BLD AUTO: 306 K/UL — SIGNIFICANT CHANGE UP (ref 150–400)
POTASSIUM SERPL-MCNC: 4.3 MMOL/L — SIGNIFICANT CHANGE UP (ref 3.5–5.3)
POTASSIUM SERPL-SCNC: 4.3 MMOL/L — SIGNIFICANT CHANGE UP (ref 3.5–5.3)
RBC # BLD: 4.44 M/UL — SIGNIFICANT CHANGE UP (ref 4.2–5.8)
RBC # BLD: 4.88 M/UL — SIGNIFICANT CHANGE UP (ref 4.2–5.8)
RBC # FLD: 14.5 % — SIGNIFICANT CHANGE UP (ref 10.3–14.5)
RBC # FLD: 14.6 % — HIGH (ref 10.3–14.5)
SODIUM SERPL-SCNC: 139 MMOL/L — SIGNIFICANT CHANGE UP (ref 135–145)
TOTAL CHOLESTEROL/HDL RATIO MEASUREMENT: 4.2 RATIO — SIGNIFICANT CHANGE UP (ref 3.4–9.6)
TRIGL SERPL-MCNC: 95 MG/DL — SIGNIFICANT CHANGE UP (ref 10–149)
TROPONIN I SERPL-MCNC: 0.07 NG/ML — HIGH (ref 0.01–0.04)
TROPONIN I SERPL-MCNC: 0.07 NG/ML — HIGH (ref 0.01–0.04)
WBC # BLD: 11.77 K/UL — HIGH (ref 3.8–10.5)
WBC # BLD: 16.38 K/UL — HIGH (ref 3.8–10.5)
WBC # FLD AUTO: 11.77 K/UL — HIGH (ref 3.8–10.5)
WBC # FLD AUTO: 16.38 K/UL — HIGH (ref 3.8–10.5)

## 2018-06-13 PROCEDURE — 99152 MOD SED SAME PHYS/QHP 5/>YRS: CPT

## 2018-06-13 PROCEDURE — 76770 US EXAM ABDO BACK WALL COMP: CPT | Mod: 26

## 2018-06-13 PROCEDURE — 93458 L HRT ARTERY/VENTRICLE ANGIO: CPT | Mod: 26

## 2018-06-13 PROCEDURE — 99233 SBSQ HOSP IP/OBS HIGH 50: CPT | Mod: 25

## 2018-06-13 RX ORDER — ALBUTEROL 90 UG/1
1 AEROSOL, METERED ORAL EVERY 4 HOURS
Qty: 0 | Refills: 0 | Status: DISCONTINUED | OUTPATIENT
Start: 2018-06-13 | End: 2018-06-14

## 2018-06-13 RX ORDER — FUROSEMIDE 40 MG
40 TABLET ORAL ONCE
Qty: 0 | Refills: 0 | Status: COMPLETED | OUTPATIENT
Start: 2018-06-13 | End: 2018-06-13

## 2018-06-13 RX ORDER — AMLODIPINE BESYLATE 2.5 MG/1
5 TABLET ORAL
Qty: 0 | Refills: 0 | Status: DISCONTINUED | OUTPATIENT
Start: 2018-06-13 | End: 2018-06-15

## 2018-06-13 RX ORDER — ATORVASTATIN CALCIUM 80 MG/1
10 TABLET, FILM COATED ORAL AT BEDTIME
Qty: 0 | Refills: 0 | Status: DISCONTINUED | OUTPATIENT
Start: 2018-06-13 | End: 2018-06-15

## 2018-06-13 RX ORDER — HEPARIN SODIUM 5000 [USP'U]/ML
1300 INJECTION INTRAVENOUS; SUBCUTANEOUS
Qty: 25000 | Refills: 0 | Status: DISCONTINUED | OUTPATIENT
Start: 2018-06-13 | End: 2018-06-13

## 2018-06-13 RX ORDER — AMLODIPINE BESYLATE 2.5 MG/1
5 TABLET ORAL DAILY
Qty: 0 | Refills: 0 | Status: DISCONTINUED | OUTPATIENT
Start: 2018-06-14 | End: 2018-06-15

## 2018-06-13 RX ORDER — FUROSEMIDE 40 MG
40 TABLET ORAL
Qty: 0 | Refills: 0 | Status: DISCONTINUED | OUTPATIENT
Start: 2018-06-14 | End: 2018-06-14

## 2018-06-13 RX ORDER — METOPROLOL TARTRATE 50 MG
25 TABLET ORAL EVERY 12 HOURS
Qty: 0 | Refills: 0 | Status: DISCONTINUED | OUTPATIENT
Start: 2018-06-13 | End: 2018-06-13

## 2018-06-13 RX ORDER — HYDRALAZINE HCL 50 MG
75 TABLET ORAL EVERY 8 HOURS
Qty: 0 | Refills: 0 | Status: DISCONTINUED | OUTPATIENT
Start: 2018-06-13 | End: 2018-06-14

## 2018-06-13 RX ORDER — METOPROLOL TARTRATE 50 MG
50 TABLET ORAL
Qty: 0 | Refills: 0 | Status: DISCONTINUED | OUTPATIENT
Start: 2018-06-13 | End: 2018-06-14

## 2018-06-13 RX ADMIN — MONTELUKAST 10 MILLIGRAM(S): 4 TABLET, CHEWABLE ORAL at 21:50

## 2018-06-13 RX ADMIN — Medication 2 INCH(S): at 21:46

## 2018-06-13 RX ADMIN — HEPARIN SODIUM 1300 UNIT(S)/HR: 5000 INJECTION INTRAVENOUS; SUBCUTANEOUS at 04:25

## 2018-06-13 RX ADMIN — ALBUTEROL 1 PUFF(S): 90 AEROSOL, METERED ORAL at 14:27

## 2018-06-13 RX ADMIN — AMLODIPINE BESYLATE 5 MILLIGRAM(S): 2.5 TABLET ORAL at 01:12

## 2018-06-13 RX ADMIN — Medication 4: at 12:06

## 2018-06-13 RX ADMIN — Medication 50 MILLIGRAM(S): at 12:57

## 2018-06-13 RX ADMIN — HEPARIN SODIUM 6000 UNIT(S): 5000 INJECTION INTRAVENOUS; SUBCUTANEOUS at 12:06

## 2018-06-13 RX ADMIN — Medication 50 MILLIGRAM(S): at 20:29

## 2018-06-13 RX ADMIN — Medication 25 MILLIGRAM(S): at 11:04

## 2018-06-13 RX ADMIN — Medication 3 MILLILITER(S): at 06:33

## 2018-06-13 RX ADMIN — CLOPIDOGREL BISULFATE 75 MILLIGRAM(S): 75 TABLET, FILM COATED ORAL at 11:04

## 2018-06-13 RX ADMIN — Medication 3 MILLILITER(S): at 20:57

## 2018-06-13 RX ADMIN — Medication 40 MILLIGRAM(S): at 05:30

## 2018-06-13 RX ADMIN — HEPARIN SODIUM 1600 UNIT(S)/HR: 5000 INJECTION INTRAVENOUS; SUBCUTANEOUS at 12:01

## 2018-06-13 RX ADMIN — Medication 650 MILLIGRAM(S): at 05:33

## 2018-06-13 RX ADMIN — LORATADINE 10 MILLIGRAM(S): 10 TABLET ORAL at 13:45

## 2018-06-13 RX ADMIN — ATORVASTATIN CALCIUM 10 MILLIGRAM(S): 80 TABLET, FILM COATED ORAL at 21:49

## 2018-06-13 RX ADMIN — AMLODIPINE BESYLATE 5 MILLIGRAM(S): 2.5 TABLET ORAL at 20:28

## 2018-06-13 RX ADMIN — Medication 50 MILLIGRAM(S): at 05:31

## 2018-06-13 RX ADMIN — Medication 40 MILLIGRAM(S): at 21:49

## 2018-06-13 RX ADMIN — Medication 81 MILLIGRAM(S): at 11:04

## 2018-06-13 NOTE — PROGRESS NOTE ADULT - ASSESSMENT
A/P    # Shortness of breath due to Acute respiratory failure due to acute decompensated heart failure   - monitor pulse ox and supplement O2  - ECHO: EF 25-30%, CHF systolic, Pt has cardiomyopathy  - Plan for LHC to r/o ischemic etiology   - C/w Lasix after cardiac cath ( in hold due to renal Failure   -C/w  bronchodilator  - Pt to obtain CT from last week   - Pulm eval   - Cardio eval appreciated     # ARF vs CKD   Pt has long standing HTN, not Tx   Monitor renal Fx  Was given gentle hydration to optimize for LHC  Renal US  Monitor uo  Avoid nephrotoxic meds   D/w DR Perry     #Acute decompensated Systolic heart failure  restart on lasix after procedure  Daily weight       #Hypertensive urgency  BP still elevated  will up titrate meds   As per Pt had elevated BP since he was 7 y.o?  Will need further d/w Renal     #Elevated glucose, prediabetes  HgA1C  6.4  BS possibly elevated due to dose of steroids  Monitor and cover with ISS  diabetic diet       #Positive troponin. R/o ACS   No CP. Plan for LHC  On Heparin drip   Start BB, on ASA, statins   Check lipid panel     # leukocytosis  CXR neg for PNA  check UA   likely due to steroids, on hold now  Monitor     #DVT PPX: heparin Sq

## 2018-06-13 NOTE — CHART NOTE - NSCHARTNOTEFT_GEN_A_CORE
Nurse Practitioner Progress note:     HPI:  51 y/o male with PMHx of asthma and having exarcerbation lately more and has finished several rounds of prednisone taper, last one finished 2 days ago. He started feeling tightness of his chest yesterday, gradually was getting worse, no aggravating factor, not relieved by taking his usual meds, associated with persistens dry cough. Also mentioned he could not lye flat yesterday due to shortness of breath  Pt referred for LHC with possible intervention.    s/p LHC : Normal coronaries, hypertensive heart disease  Pt denies chest pain/SOB/palpitations.    PHYSICAL EXAM:  V/S:  BP  156/90         HR  67       RR 16  Neurologic: Non-focal, A&Ox3.  No neuro deficits  Cardiac : (+)S1S2,RRR  Lungs: CTA B/L  Procedure Site: Rt. radial hemoband device noted. site benign soft no bleeding no hematoma 2+radial pulses      PLAN: 	  -VS, labs, diet, activity as per post cath orders  -hemoband to be removed at 18:30  -Encourage PO fluids  -change metoprolol to toprol 50mg BID, increase amlodipine to 5mg BID, hydralazine 75mg TID for BP control  - add lipitor 10mg QD.  -change IV lasix to po 40mg BID starting tomorrow  -Plan of care D/W pt. and Dr. Ruiz & Dr. Velázquez  -Post cath instructions reviewed with pt. then pt. verbalizes understanding   -Follow-up with attending in 1-2 weeks upon discharge. Nurse Practitioner Progress note:     HPI:  53 y/o male with PMHx of asthma and having exarcerbation lately more and has finished several rounds of prednisone taper, last one finished 2 days ago. He started feeling tightness of his chest yesterday, gradually was getting worse, no aggravating factor, not relieved by taking his usual meds, associated with persistens dry cough. Also mentioned he could not lye flat yesterday due to shortness of breath  Pt referred for LHC with possible intervention.    s/p LHC : Normal coronaries, hypertensive heart disease  Pt denies chest pain/SOB/palpitations.    PHYSICAL EXAM:  V/S:  BP  156/90         HR  67       RR 16  Neurologic: Non-focal, A&Ox3.  No neuro deficits  Cardiac : (+)S1S2,RRR  Lungs: CTA B/L  Procedure Site: Rt. radial hemoband device noted. site benign soft no bleeding no hematoma 2+radial pulses      PLAN: 	  -VS, labs, diet, activity as per post cath orders  -hemoband to be removed at 18:30  -Encourage PO fluids  -change metoprolol to toprol 50mg BID, increase amlodipine to 5mg BID, hydralazine 75mg TID for BP control as per Dr. Ruiz.  - add lipitor 10mg QD.  -change IV lasix to po 40mg BID starting tomorrow  -Plan of care D/W pt. and Dr. Ruiz & Dr. Velázquez  -Post cath instructions reviewed with pt. then pt. verbalizes understanding   -Follow-up with attending in 1-2 weeks upon discharge.

## 2018-06-13 NOTE — PROGRESS NOTE ADULT - ASSESSMENT
51 y/o male with pmh of asthma and having exarcerbation lately more and has finished several rounds of prednisone taper, last one finished 2 days ago. He started feeling tightness of his chest yesterday, gradually was getting worse, no aggravating factor, not relieved by taking his usual meds, associated with persistens dry cough. Also mentioned he could not lye flat yesterday due to shortness of breath.  Pt states has been to Dr Pablo gould 2005, placed on antihypertensives, did not feels well, stopped them.  seen by Dr Winifred Christina in the past, was on meds, insurance changed , did not follow up, seen by allergy/ pulmonology, who has been urging him to see an internist for a long time.   No knowledge of any renal issues  found to have mild elevated troponin elevation and BP greater than 200, 100, creat 1.63  for cardiac cath in am, risks of cath explained to pt, his wife and daughter in the room    A/P  HTN  Morbid Obesity  Most likely CKD III due to above  CAD?  For cardiac cath in am, risk benefit d/w pt  IV hydration 150 ml / hr start prior to cath  increase hydralazine to 50 mg q 8   renal sono  arb as outpt for renal protection  UA  pth    6/13 SY  --Renal dysfunction with unclear hx and baseline.  Creat slightly improved.   Follow up UA and sonogram to determine need for further work up.  --Cardiac cath today due to more urgent cardiac symptoms.   Pt fully understands risk of RICH.  --OF concern is increasing pulmonary symptoms with CXR report of diffuse interstitial infiltrates--Follow up with CT. 51 y/o male with pmh of asthma and having exarcerbation lately more and has finished several rounds of prednisone taper, last one finished 2 days ago. He started feeling tightness of his chest yesterday, gradually was getting worse, no aggravating factor, not relieved by taking his usual meds, associated with persistens dry cough. Also mentioned he could not lye flat yesterday due to shortness of breath.  Pt states has been to Dr Pablo gould 2005, placed on antihypertensives, did not feels well, stopped them.  seen by Dr Winifred Christina in the past, was on meds, insurance changed , did not follow up, seen by allergy/ pulmonology, who has been urging him to see an internist for a long time.   No knowledge of any renal issues  found to have mild elevated troponin elevation and BP greater than 200, 100, creat 1.63  for cardiac cath in am, risks of cath explained to pt, his wife and daughter in the room    A/P  HTN  Morbid Obesity  Most likely CKD III due to above  CAD?  For cardiac cath in am, risk benefit d/w pt  IV hydration 150 ml / hr start prior to cath  increase hydralazine to 50 mg q 8   renal sono  arb as outpt for renal protection  UA  pth    6/13 SY  --Renal dysfunction with unclear hx and baseline.  Creat slightly improved.   Follow up UA and sonogram to determine need for further work up.  --Cardiac cath today due to more urgent cardiac symptoms.   Pt fully understands risk of RICH.  --OF concern is increasing pulmonary symptoms with CXR report of diffuse interstitial infiltrates--Follow up with CT.    Addendum:  Pt reports he had a CT scan one week ago ordered by his Allergist.  Result faxed to ED yesterday but not in chart.  Will attempt to obtain result.

## 2018-06-14 DIAGNOSIS — I12.9 HYPERTENSIVE CHRONIC KIDNEY DISEASE WITH STAGE 1 THROUGH STAGE 4 CHRONIC KIDNEY DISEASE, OR UNSPECIFIED CHRONIC KIDNEY DISEASE: ICD-10-CM

## 2018-06-14 DIAGNOSIS — I10 ESSENTIAL (PRIMARY) HYPERTENSION: ICD-10-CM

## 2018-06-14 LAB
ALBUMIN SERPL ELPH-MCNC: 3.1 G/DL — LOW (ref 3.3–5)
ANION GAP SERPL CALC-SCNC: 7 MMOL/L — SIGNIFICANT CHANGE UP (ref 5–17)
BUN SERPL-MCNC: 29 MG/DL — HIGH (ref 7–23)
CALCIUM SERPL-MCNC: 8.6 MG/DL — SIGNIFICANT CHANGE UP (ref 8.5–10.1)
CHLORIDE SERPL-SCNC: 100 MMOL/L — SIGNIFICANT CHANGE UP (ref 96–108)
CO2 SERPL-SCNC: 31 MMOL/L — SIGNIFICANT CHANGE UP (ref 22–31)
CREAT SERPL-MCNC: 1.87 MG/DL — HIGH (ref 0.5–1.3)
GLUCOSE BLDC GLUCOMTR-MCNC: 102 MG/DL — HIGH (ref 70–99)
GLUCOSE BLDC GLUCOMTR-MCNC: 103 MG/DL — HIGH (ref 70–99)
GLUCOSE BLDC GLUCOMTR-MCNC: 115 MG/DL — HIGH (ref 70–99)
GLUCOSE BLDC GLUCOMTR-MCNC: 97 MG/DL — SIGNIFICANT CHANGE UP (ref 70–99)
GLUCOSE SERPL-MCNC: 94 MG/DL — SIGNIFICANT CHANGE UP (ref 70–99)
HCT VFR BLD CALC: 44.2 % — SIGNIFICANT CHANGE UP (ref 39–50)
HGB BLD-MCNC: 14.1 G/DL — SIGNIFICANT CHANGE UP (ref 13–17)
MCHC RBC-ENTMCNC: 28.5 PG — SIGNIFICANT CHANGE UP (ref 27–34)
MCHC RBC-ENTMCNC: 31.9 GM/DL — LOW (ref 32–36)
MCV RBC AUTO: 89.5 FL — SIGNIFICANT CHANGE UP (ref 80–100)
NRBC # BLD: 0 /100 WBCS — SIGNIFICANT CHANGE UP (ref 0–0)
PHOSPHATE SERPL-MCNC: 3 MG/DL — SIGNIFICANT CHANGE UP (ref 2.5–4.5)
PLATELET # BLD AUTO: 297 K/UL — SIGNIFICANT CHANGE UP (ref 150–400)
POTASSIUM SERPL-MCNC: 3.9 MMOL/L — SIGNIFICANT CHANGE UP (ref 3.5–5.3)
POTASSIUM SERPL-SCNC: 3.9 MMOL/L — SIGNIFICANT CHANGE UP (ref 3.5–5.3)
PTH-INTACT FLD-MCNC: 91 PG/ML — HIGH (ref 15–65)
RBC # BLD: 4.94 M/UL — SIGNIFICANT CHANGE UP (ref 4.2–5.8)
RBC # FLD: 15 % — HIGH (ref 10.3–14.5)
SODIUM SERPL-SCNC: 138 MMOL/L — SIGNIFICANT CHANGE UP (ref 135–145)
WBC # BLD: 12.65 K/UL — HIGH (ref 3.8–10.5)
WBC # FLD AUTO: 12.65 K/UL — HIGH (ref 3.8–10.5)

## 2018-06-14 PROCEDURE — 99233 SBSQ HOSP IP/OBS HIGH 50: CPT

## 2018-06-14 RX ORDER — METOPROLOL TARTRATE 50 MG
75 TABLET ORAL EVERY 12 HOURS
Qty: 0 | Refills: 0 | Status: DISCONTINUED | OUTPATIENT
Start: 2018-06-14 | End: 2018-06-14

## 2018-06-14 RX ORDER — ISOSORBIDE MONONITRATE 60 MG/1
60 TABLET, EXTENDED RELEASE ORAL DAILY
Qty: 0 | Refills: 0 | Status: DISCONTINUED | OUTPATIENT
Start: 2018-06-14 | End: 2018-06-15

## 2018-06-14 RX ORDER — METOPROLOL TARTRATE 50 MG
25 TABLET ORAL ONCE
Qty: 0 | Refills: 0 | Status: COMPLETED | OUTPATIENT
Start: 2018-06-14 | End: 2018-06-14

## 2018-06-14 RX ORDER — ISOSORBIDE MONONITRATE 60 MG/1
60 TABLET, EXTENDED RELEASE ORAL AT BEDTIME
Qty: 0 | Refills: 0 | Status: DISCONTINUED | OUTPATIENT
Start: 2018-06-14 | End: 2018-06-15

## 2018-06-14 RX ORDER — BUDESONIDE AND FORMOTEROL FUMARATE DIHYDRATE 160; 4.5 UG/1; UG/1
2 AEROSOL RESPIRATORY (INHALATION)
Qty: 0 | Refills: 0 | Status: DISCONTINUED | OUTPATIENT
Start: 2018-06-14 | End: 2018-06-15

## 2018-06-14 RX ORDER — METOPROLOL TARTRATE 50 MG
100 TABLET ORAL
Qty: 0 | Refills: 0 | Status: DISCONTINUED | OUTPATIENT
Start: 2018-06-14 | End: 2018-06-15

## 2018-06-14 RX ORDER — FUROSEMIDE 40 MG
40 TABLET ORAL DAILY
Qty: 0 | Refills: 0 | Status: DISCONTINUED | OUTPATIENT
Start: 2018-06-15 | End: 2018-06-15

## 2018-06-14 RX ORDER — HEPARIN SODIUM 5000 [USP'U]/ML
5000 INJECTION INTRAVENOUS; SUBCUTANEOUS EVERY 8 HOURS
Qty: 0 | Refills: 0 | Status: DISCONTINUED | OUTPATIENT
Start: 2018-06-14 | End: 2018-06-15

## 2018-06-14 RX ORDER — ISOSORBIDE MONONITRATE 60 MG/1
60 TABLET, EXTENDED RELEASE ORAL DAILY
Qty: 0 | Refills: 0 | Status: DISCONTINUED | OUTPATIENT
Start: 2018-06-14 | End: 2018-06-14

## 2018-06-14 RX ORDER — SODIUM CHLORIDE 9 MG/ML
1000 INJECTION INTRAMUSCULAR; INTRAVENOUS; SUBCUTANEOUS
Qty: 0 | Refills: 0 | Status: DISCONTINUED | OUTPATIENT
Start: 2018-06-14 | End: 2018-06-15

## 2018-06-14 RX ORDER — HYDRALAZINE HCL 50 MG
75 TABLET ORAL EVERY 8 HOURS
Qty: 0 | Refills: 0 | Status: DISCONTINUED | OUTPATIENT
Start: 2018-06-14 | End: 2018-06-15

## 2018-06-14 RX ADMIN — Medication 1 MILLIGRAM(S): at 13:12

## 2018-06-14 RX ADMIN — Medication 75 MILLIGRAM(S): at 01:16

## 2018-06-14 RX ADMIN — Medication 50 MILLIGRAM(S): at 05:57

## 2018-06-14 RX ADMIN — AMLODIPINE BESYLATE 5 MILLIGRAM(S): 2.5 TABLET ORAL at 05:57

## 2018-06-14 RX ADMIN — Medication 75 MILLIGRAM(S): at 22:07

## 2018-06-14 RX ADMIN — AMLODIPINE BESYLATE 5 MILLIGRAM(S): 2.5 TABLET ORAL at 22:08

## 2018-06-14 RX ADMIN — Medication 100 MILLIGRAM(S): at 17:29

## 2018-06-14 RX ADMIN — Medication 25 MILLIGRAM(S): at 13:13

## 2018-06-14 RX ADMIN — Medication 3 MILLILITER(S): at 09:03

## 2018-06-14 RX ADMIN — ATORVASTATIN CALCIUM 10 MILLIGRAM(S): 80 TABLET, FILM COATED ORAL at 22:07

## 2018-06-14 RX ADMIN — LORATADINE 10 MILLIGRAM(S): 10 TABLET ORAL at 09:12

## 2018-06-14 RX ADMIN — Medication 81 MILLIGRAM(S): at 09:12

## 2018-06-14 RX ADMIN — MONTELUKAST 10 MILLIGRAM(S): 4 TABLET, CHEWABLE ORAL at 22:10

## 2018-06-14 RX ADMIN — HEPARIN SODIUM 5000 UNIT(S): 5000 INJECTION INTRAVENOUS; SUBCUTANEOUS at 15:00

## 2018-06-14 RX ADMIN — Medication 40 MILLIGRAM(S): at 05:57

## 2018-06-14 RX ADMIN — Medication 75 MILLIGRAM(S): at 09:12

## 2018-06-14 RX ADMIN — Medication 3 MILLILITER(S): at 21:10

## 2018-06-14 RX ADMIN — ISOSORBIDE MONONITRATE 60 MILLIGRAM(S): 60 TABLET, EXTENDED RELEASE ORAL at 13:12

## 2018-06-14 RX ADMIN — HEPARIN SODIUM 5000 UNIT(S): 5000 INJECTION INTRAVENOUS; SUBCUTANEOUS at 22:10

## 2018-06-14 RX ADMIN — Medication 3 MILLILITER(S): at 13:52

## 2018-06-14 NOTE — CONSULT NOTE ADULT - ASSESSMENT
- d/c albuterol mdi  - restart symbicort 160/4.5 mcg bid  - cont spiriva  - recent worsened dyspnea most likely due to cardiomyopathy  - creat increased post cath. Would start ivf however has LVEF 10%  - BP improved.  - dvt proph - d/c albuterol mdi  - restart symbicort 160/4.5 mcg bid  - cont spiriva  - recent worsened dyspnea most likely due to cardiomyopathy  - creat increased post cath. Would start ivf however has LVEF 30%  - BP improved.  - dvt proph

## 2018-06-14 NOTE — CONSULT NOTE ADULT - PROBLEM SELECTOR PROBLEM 3
Cardiomyopathy due to hypertension, with heart failure
NSTEMI (non-ST elevated myocardial infarction)

## 2018-06-14 NOTE — PROGRESS NOTE ADULT - ASSESSMENT
A/P    # Shortness of breath due to Acute respiratory failure due to acute decompensated systolic heart failure due to htn cardiomyopathy- EF 30%  - s/p LHC non obstructive pattern  - Subtle rise in Scr after LHC  -C/w  bronchodilator  - Reduce dose of lasix  - gentle IVF   -     # ARF vs CKD III  Unknown baseline- likely has long standing hx of uncontrolled HTN which has contributed to his CKD  Was given gentle hydration to optimize for LHC  Renal US: nl  Monitor uo  Avoid nephrotoxic meds   D/w DR Perry     #Acute decompensated Systolic heart failure  restart on lasix after procedure-resolving  Daily weight       #Hypertensive urgency  Goal -160. Optimize BP as patient has likely lived with extremely high BPs and lowering them abruptly will cause hypoperfusion to end organs.   Will need slow titration of meds to optimize BP slowly. Pt has decided on Dr Clifton as PCP      #Elevated glucose, prediabetes  HgA1C  6.4  BS possibly elevated due to dose of steroids  Monitor and cover with ISS  diabetic diet       #Positive troponin/Demand ischemia  Start BB, on ASA, statins       # leukocytosis  CXR neg for PNA  check UA   was transiently on steroids which may have caused spike in WBC count      #DVT PPX: heparin Sq

## 2018-06-14 NOTE — PROGRESS NOTE ADULT - ASSESSMENT
53 y/o male with pmh of asthma and having exarcerbation lately more and has finished several rounds of prednisone taper, last one finished 2 days ago. He started feeling tightness of his chest yesterday, gradually was getting worse, no aggravating factor, not relieved by taking his usual meds, associated with persistens dry cough. Also mentioned he could not lye flat yesterday due to shortness of breath.  Pt states has been to Dr Pablo gould 2005, placed on antihypertensives, did not feels well, stopped them.  seen by Dr Winifred Christina in the past, was on meds, insurance changed , did not follow up, seen by allergy/ pulmonology, who has been urging him to see an internist for a long time.   No knowledge of any renal issues  found to have mild elevated troponin elevation and BP greater than 200, 100, creat 1.63  for cardiac cath in am, risks of cath explained to pt, his wife and daughter in the room    A/P  HTN  Morbid Obesity  Most likely CKD III due to above  CAD?  For cardiac cath in am, risk benefit d/w pt  IV hydration 150 ml / hr start prior to cath  increase hydralazine to 50 mg q 8   renal sono  arb as outpt for renal protection  UA  pth    6/13 SY  --Renal dysfunction with unclear hx and baseline.  Creat slightly improved.   Follow up UA and sonogram to determine need for further work up.  --Cardiac cath today due to more urgent cardiac symptoms.   Pt fully understands risk of RICH.  --OF concern is increasing pulmonary symptoms with CXR report of diffuse interstitial infiltrates--Follow up with CT.    Addendum:  Pt reports he had a CT scan one week ago ordered by his Allergist.  Result faxed to ED yesterday but not in chart.  Will attempt to obtain result.    6/14 SY  --VIJAY with likely CKD and unclear baseline.  Creat slightly increased today.  May be more due to relative reduction in BP with meds now and may have to accept the increase to achieve BP control.  Follow with gentle hydration.--Limited due to EF of 30%.  --CT scan revealing emphysema as well as infiltrates and adenopathy.   To follow up with pulmonary as out patient.

## 2018-06-14 NOTE — CONSULT NOTE ADULT - SUBJECTIVE AND OBJECTIVE BOX
HPI:  51 y/o male with pmh of asthma on Symbicort 160/4,5 mcg, Singulair and albuterol. He has been having exarcerbation lately more and has finished several rounds of prednisone taper, last one finished 2 days ago. Mr. Hairston has been using albuterol mdi several times per day. He started feeling tightness of his chest yesterday, gradually was getting worse, no aggravating factor, not relieved by taking his usual meds, associated with persistens dry cough. Also mentioned he could not lye flat yesterday due to shortness of breath  On admission BP systolic was greater than 200mm HG. Patient had ECHO which showed dilated LA/LV with LVEF estimated 30 %. Patient had cardiac catheterization which shows triple vessel non obstructive CAD with a LVEF of 10-15%. Creatinine also elevated.    PAST MEDICAL & SURGICAL HISTORY:  Asthma  No significant past surgical history      MEDICATIONS  (STANDING):  ALBUTerol    90 MICROgram(s) HFA Inhaler 1 Puff(s) Inhalation every 4 hours  ALBUTerol/ipratropium for Nebulization 3 milliLiter(s) Nebulizer every 6 hours  amLODIPine   Tablet 5 milliGRAM(s) Oral daily  amLODIPine   Tablet 5 milliGRAM(s) Oral <User Schedule>  aspirin enteric coated 81 milliGRAM(s) Oral daily  atorvastatin 10 milliGRAM(s) Oral at bedtime  dextrose 5%. 1000 milliLiter(s) (50 mL/Hr) IV Continuous <Continuous>  dextrose 50% Injectable 12.5 Gram(s) IV Push once  dextrose 50% Injectable 25 Gram(s) IV Push once  dextrose 50% Injectable 25 Gram(s) IV Push once  furosemide    Tablet 40 milliGRAM(s) Oral two times a day  hydrALAZINE 75 milliGRAM(s) Oral every 8 hours  insulin lispro (HumaLOG) corrective regimen sliding scale   SubCutaneous three times a day before meals  loratadine 10 milliGRAM(s) Oral daily  metoprolol succinate ER 50 milliGRAM(s) Oral two times a day  montelukast 10 milliGRAM(s) Oral at bedtime  nitroglycerin    2% Ointment 2 Inch(s) Transdermal four times a day  tiotropium 18 MICROgram(s) Capsule 1 Capsule(s) Inhalation daily    MEDICATIONS  (PRN):  acetaminophen   Tablet. 650 milliGRAM(s) Oral every 6 hours PRN Mild Pain (1 - 3)  dextrose 40% Gel 15 Gram(s) Oral once PRN Blood Glucose LESS THAN 70 milliGRAM(s)/deciliter  glucagon  Injectable 1 milliGRAM(s) IntraMuscular once PRN Glucose LESS THAN 70 milligrams/deciliter  heparin  Injectable 6000 Unit(s) IV Push every 6 hours PRN For aPTT less than 40      Allergies    No Known Allergies    Intolerances        SOCIAL HISTORY: Denies tobacco, etoh abuse or illicit drug use    FAMILY HISTORY:  Family history of melanoma (Mother)      Vital Signs Last 24 Hrs  T(C): 37.2 (14 Jun 2018 06:24), Max: 37.7 (13 Jun 2018 23:18)  T(F): 99 (14 Jun 2018 06:24), Max: 99.9 (13 Jun 2018 23:18)  HR: 85 (14 Jun 2018 05:00) (70 - 99)  BP: 176/105 (14 Jun 2018 05:00) (146/96 - 201/139)  BP(mean): 121 (14 Jun 2018 05:00) (98 - 153)  RR: 33 (14 Jun 2018 01:00) (11 - 35)  SpO2: 96% (13 Jun 2018 18:20) (92% - 97%)    REVIEW OF SYSTEMS:    CONSTITUTIONAL:  As per HPI.  SKIN: no rashes  HEENT:  Eyes:  No diplopia or blurred vision. ENT:  No earache, sore throat or runny nose.  CARDIOVASCULAR:  No pressure, squeezing, tightness, heaviness or aching about the chest, neck, axilla or epigastrium.  RESPIRATORY:  No cough, shortness of breath, PND or orthopnea.  GASTROINTESTINAL:  No nausea, vomiting or diarrhea.  GENITOURINARY:  No dysuria, frequency or urgency.  MUSCULOSKELETAL:  As per HPI.  SKIN:  No change in skin, hair or nails.  NEUROLOGIC:  No paresthesias, fasciculations, seizures or weakness.  PSYCHIATRIC:  No disorder of thought or mood.  ENDOCRINE:  No heat or cold intolerance, polyuria or polydipsia.  HEMATOLOGICAL:  No easy bruising or bleedings:  .     PHYSICAL EXAMINATION:    GENERAL APPEARANCE:  Pt. is not currently dyspneic, in no distress. Pt. is alert, oriented, and pleasant.  HEENT:  Pupils are normal and react normally. No icterus. Mucous membranes well colored.  NECK:  Supple. No lymphadenopathy. Jugular venous pressure not elevated. Carotids equal.   HEART:   The cardiac impulse has a normal quality. Regular. Normal S1 and S2. There are no murmurs, rubs or gallops noted  CHEST:  Chest is clear to auscultation. Normal respiratory effort.  ABDOMEN:  Soft and nontender.   EXTREMITIES:  There is no cyanosis, clubbing or edema.   SKIN:  No rash or significant lesions are noted.  CNS: AAO x 3    LABS:                        14.1   12.65 )-----------( 297      ( 14 Jun 2018 04:22 )             44.2     06-14    138  |  100  |  29<H>  ----------------------------<  94  3.9   |  31  |  1.87<H>    Ca    8.6      14 Jun 2018 04:22  Phos  3.0     06-14    TPro  x   /  Alb  3.1<L>  /  TBili  x   /  DBili  x   /  AST  x   /  ALT  x   /  AlkPhos  x   06-14    LIVER FUNCTIONS - ( 14 Jun 2018 04:22 )  Alb: 3.1 g/dL / Pro: x     / ALK PHOS: x     / ALT: x     / AST: x     / GGT: x           PT/INR - ( 12 Jun 2018 08:58 )   PT: 11.8 sec;   INR: 1.09 ratio         PTT - ( 13 Jun 2018 10:35 )  PTT:30.3 sec  CARDIAC MARKERS ( 13 Jun 2018 10:36 )  0.066 ng/mL / x     / 164 U/L / x     / x      CARDIAC MARKERS ( 13 Jun 2018 03:14 )  0.067 ng/mL / x     / 153 U/L / x     / x      CARDIAC MARKERS ( 12 Jun 2018 19:54 )  0.079 ng/mL / x     / 177 U/L / x     / x      CARDIAC MARKERS ( 12 Jun 2018 17:15 )  0.084 ng/mL / x     / x     / x     / x      CARDIAC MARKERS ( 12 Jun 2018 12:16 )  0.122 ng/mL / x     / x     / x     / x      CARDIAC MARKERS ( 12 Jun 2018 08:58 )  0.168 ng/mL / x     / x     / x     / x                RADIOLOGY & ADDITIONAL STUDIES: HPI:  51 y/o male with pmh of asthma on Symbicort 160/4,5 mcg, Singulair and albuterol. He has been having exarcerbation lately more and has finished several rounds of prednisone taper, last one finished 2 days ago. Mr. Hairston has been using albuterol mdi several times per day. He started feeling tightness of his chest yesterday, gradually was getting worse, no aggravating factor, not relieved by taking his usual meds, associated with persistens dry cough. Also mentioned he could not lye flat yesterday due to shortness of breath  On admission BP systolic was greater than 200mm HG. Patient had ECHO which showed dilated LA/LV with LVEF estimated 30 %. Patient had cardiac catheterization which shows triple vessel non obstructive CAD with a LVEF of 30%. Creatinine also elevated.    PAST MEDICAL & SURGICAL HISTORY:  Asthma  No significant past surgical history      MEDICATIONS  (STANDING):  ALBUTerol    90 MICROgram(s) HFA Inhaler 1 Puff(s) Inhalation every 4 hours  ALBUTerol/ipratropium for Nebulization 3 milliLiter(s) Nebulizer every 6 hours  amLODIPine   Tablet 5 milliGRAM(s) Oral daily  amLODIPine   Tablet 5 milliGRAM(s) Oral <User Schedule>  aspirin enteric coated 81 milliGRAM(s) Oral daily  atorvastatin 10 milliGRAM(s) Oral at bedtime  dextrose 5%. 1000 milliLiter(s) (50 mL/Hr) IV Continuous <Continuous>  dextrose 50% Injectable 12.5 Gram(s) IV Push once  dextrose 50% Injectable 25 Gram(s) IV Push once  dextrose 50% Injectable 25 Gram(s) IV Push once  furosemide    Tablet 40 milliGRAM(s) Oral two times a day  hydrALAZINE 75 milliGRAM(s) Oral every 8 hours  insulin lispro (HumaLOG) corrective regimen sliding scale   SubCutaneous three times a day before meals  loratadine 10 milliGRAM(s) Oral daily  metoprolol succinate ER 50 milliGRAM(s) Oral two times a day  montelukast 10 milliGRAM(s) Oral at bedtime  nitroglycerin    2% Ointment 2 Inch(s) Transdermal four times a day  tiotropium 18 MICROgram(s) Capsule 1 Capsule(s) Inhalation daily    MEDICATIONS  (PRN):  acetaminophen   Tablet. 650 milliGRAM(s) Oral every 6 hours PRN Mild Pain (1 - 3)  dextrose 40% Gel 15 Gram(s) Oral once PRN Blood Glucose LESS THAN 70 milliGRAM(s)/deciliter  glucagon  Injectable 1 milliGRAM(s) IntraMuscular once PRN Glucose LESS THAN 70 milligrams/deciliter  heparin  Injectable 6000 Unit(s) IV Push every 6 hours PRN For aPTT less than 40      Allergies    No Known Allergies    Intolerances        SOCIAL HISTORY: Denies tobacco, etoh abuse or illicit drug use    FAMILY HISTORY:  Family history of melanoma (Mother)      Vital Signs Last 24 Hrs  T(C): 37.2 (14 Jun 2018 06:24), Max: 37.7 (13 Jun 2018 23:18)  T(F): 99 (14 Jun 2018 06:24), Max: 99.9 (13 Jun 2018 23:18)  HR: 85 (14 Jun 2018 05:00) (70 - 99)  BP: 176/105 (14 Jun 2018 05:00) (146/96 - 201/139)  BP(mean): 121 (14 Jun 2018 05:00) (98 - 153)  RR: 33 (14 Jun 2018 01:00) (11 - 35)  SpO2: 96% (13 Jun 2018 18:20) (92% - 97%)    REVIEW OF SYSTEMS:    CONSTITUTIONAL:  As per HPI.  SKIN: no rashes  HEENT:  Eyes:  No diplopia or blurred vision. ENT:  No earache, sore throat or runny nose.  CARDIOVASCULAR:  No pressure, squeezing, tightness, heaviness or aching about the chest, neck, axilla or epigastrium.  RESPIRATORY:  No cough, shortness of breath, PND or orthopnea.  GASTROINTESTINAL:  No nausea, vomiting or diarrhea.  GENITOURINARY:  No dysuria, frequency or urgency.  MUSCULOSKELETAL:  As per HPI.  SKIN:  No change in skin, hair or nails.  NEUROLOGIC:  No paresthesias, fasciculations, seizures or weakness.  PSYCHIATRIC:  No disorder of thought or mood.  ENDOCRINE:  No heat or cold intolerance, polyuria or polydipsia.  HEMATOLOGICAL:  No easy bruising or bleedings:  .     PHYSICAL EXAMINATION:    GENERAL APPEARANCE:  Pt. is not currently dyspneic, in no distress. Pt. is alert, oriented, and pleasant.  HEENT:  Pupils are normal and react normally. No icterus. Mucous membranes well colored.  NECK:  Supple. No lymphadenopathy. Jugular venous pressure not elevated. Carotids equal.   HEART:   The cardiac impulse has a normal quality. Regular. Normal S1 and S2. There are no murmurs, rubs or gallops noted  CHEST:  Chest is clear to auscultation. Normal respiratory effort.  ABDOMEN:  Soft and nontender.   EXTREMITIES:  There is no cyanosis, clubbing or edema.   SKIN:  No rash or significant lesions are noted.  CNS: AAO x 3    LABS:                        14.1   12.65 )-----------( 297      ( 14 Jun 2018 04:22 )             44.2     06-14    138  |  100  |  29<H>  ----------------------------<  94  3.9   |  31  |  1.87<H>    Ca    8.6      14 Jun 2018 04:22  Phos  3.0     06-14    TPro  x   /  Alb  3.1<L>  /  TBili  x   /  DBili  x   /  AST  x   /  ALT  x   /  AlkPhos  x   06-14    LIVER FUNCTIONS - ( 14 Jun 2018 04:22 )  Alb: 3.1 g/dL / Pro: x     / ALK PHOS: x     / ALT: x     / AST: x     / GGT: x           PT/INR - ( 12 Jun 2018 08:58 )   PT: 11.8 sec;   INR: 1.09 ratio         PTT - ( 13 Jun 2018 10:35 )  PTT:30.3 sec  CARDIAC MARKERS ( 13 Jun 2018 10:36 )  0.066 ng/mL / x     / 164 U/L / x     / x      CARDIAC MARKERS ( 13 Jun 2018 03:14 )  0.067 ng/mL / x     / 153 U/L / x     / x      CARDIAC MARKERS ( 12 Jun 2018 19:54 )  0.079 ng/mL / x     / 177 U/L / x     / x      CARDIAC MARKERS ( 12 Jun 2018 17:15 )  0.084 ng/mL / x     / x     / x     / x      CARDIAC MARKERS ( 12 Jun 2018 12:16 )  0.122 ng/mL / x     / x     / x     / x      CARDIAC MARKERS ( 12 Jun 2018 08:58 )  0.168 ng/mL / x     / x     / x     / x                RADIOLOGY & ADDITIONAL STUDIES:

## 2018-06-15 ENCOUNTER — TRANSCRIPTION ENCOUNTER (OUTPATIENT)
Age: 53
End: 2018-06-15

## 2018-06-15 VITALS — TEMPERATURE: 98 F

## 2018-06-15 PROBLEM — Z00.00 ENCOUNTER FOR PREVENTIVE HEALTH EXAMINATION: Status: ACTIVE | Noted: 2018-06-15

## 2018-06-15 LAB
ANION GAP SERPL CALC-SCNC: 6 MMOL/L — SIGNIFICANT CHANGE UP (ref 5–17)
BUN SERPL-MCNC: 29 MG/DL — HIGH (ref 7–23)
CALCIUM SERPL-MCNC: 8.2 MG/DL — LOW (ref 8.5–10.1)
CHLORIDE SERPL-SCNC: 101 MMOL/L — SIGNIFICANT CHANGE UP (ref 96–108)
CO2 SERPL-SCNC: 31 MMOL/L — SIGNIFICANT CHANGE UP (ref 22–31)
CREAT SERPL-MCNC: 1.82 MG/DL — HIGH (ref 0.5–1.3)
GLUCOSE BLDC GLUCOMTR-MCNC: 100 MG/DL — HIGH (ref 70–99)
GLUCOSE BLDC GLUCOMTR-MCNC: 102 MG/DL — HIGH (ref 70–99)
GLUCOSE SERPL-MCNC: 97 MG/DL — SIGNIFICANT CHANGE UP (ref 70–99)
HCT VFR BLD CALC: 43.4 % — SIGNIFICANT CHANGE UP (ref 39–50)
HGB BLD-MCNC: 13.7 G/DL — SIGNIFICANT CHANGE UP (ref 13–17)
MCHC RBC-ENTMCNC: 28.3 PG — SIGNIFICANT CHANGE UP (ref 27–34)
MCHC RBC-ENTMCNC: 31.6 GM/DL — LOW (ref 32–36)
MCV RBC AUTO: 89.7 FL — SIGNIFICANT CHANGE UP (ref 80–100)
NRBC # BLD: 0 /100 WBCS — SIGNIFICANT CHANGE UP (ref 0–0)
PLATELET # BLD AUTO: 242 K/UL — SIGNIFICANT CHANGE UP (ref 150–400)
POTASSIUM SERPL-MCNC: 3.6 MMOL/L — SIGNIFICANT CHANGE UP (ref 3.5–5.3)
POTASSIUM SERPL-SCNC: 3.6 MMOL/L — SIGNIFICANT CHANGE UP (ref 3.5–5.3)
RBC # BLD: 4.84 M/UL — SIGNIFICANT CHANGE UP (ref 4.2–5.8)
RBC # FLD: 14.7 % — HIGH (ref 10.3–14.5)
SODIUM SERPL-SCNC: 138 MMOL/L — SIGNIFICANT CHANGE UP (ref 135–145)
WBC # BLD: 8.48 K/UL — SIGNIFICANT CHANGE UP (ref 3.8–10.5)
WBC # FLD AUTO: 8.48 K/UL — SIGNIFICANT CHANGE UP (ref 3.8–10.5)

## 2018-06-15 PROCEDURE — 99233 SBSQ HOSP IP/OBS HIGH 50: CPT

## 2018-06-15 RX ORDER — ASPIRIN/CALCIUM CARB/MAGNESIUM 324 MG
1 TABLET ORAL
Qty: 0 | Refills: 0 | COMMUNITY
Start: 2018-06-15

## 2018-06-15 RX ORDER — TIOTROPIUM BROMIDE 18 UG/1
1 CAPSULE ORAL; RESPIRATORY (INHALATION)
Qty: 30 | Refills: 0 | OUTPATIENT
Start: 2018-06-15

## 2018-06-15 RX ORDER — ISOSORBIDE MONONITRATE 60 MG/1
1 TABLET, EXTENDED RELEASE ORAL
Qty: 60 | Refills: 0 | OUTPATIENT
Start: 2018-06-15

## 2018-06-15 RX ORDER — MONTELUKAST 4 MG/1
1 TABLET, CHEWABLE ORAL
Qty: 30 | Refills: 0 | OUTPATIENT
Start: 2018-06-15

## 2018-06-15 RX ORDER — FUROSEMIDE 40 MG
1 TABLET ORAL
Qty: 30 | Refills: 0 | OUTPATIENT
Start: 2018-06-15

## 2018-06-15 RX ORDER — HYDRALAZINE HCL 50 MG
3 TABLET ORAL
Qty: 90 | Refills: 0 | OUTPATIENT
Start: 2018-06-15

## 2018-06-15 RX ORDER — AMLODIPINE BESYLATE 2.5 MG/1
1 TABLET ORAL
Qty: 60 | Refills: 0 | OUTPATIENT
Start: 2018-06-15

## 2018-06-15 RX ORDER — BUDESONIDE AND FORMOTEROL FUMARATE DIHYDRATE 160; 4.5 UG/1; UG/1
1 AEROSOL RESPIRATORY (INHALATION)
Qty: 1 | Refills: 0 | OUTPATIENT
Start: 2018-06-15

## 2018-06-15 RX ORDER — ATORVASTATIN CALCIUM 80 MG/1
1 TABLET, FILM COATED ORAL
Qty: 30 | Refills: 0 | OUTPATIENT
Start: 2018-06-15

## 2018-06-15 RX ORDER — GLIMEPIRIDE 1 MG
1 TABLET ORAL
Qty: 30 | Refills: 0 | OUTPATIENT
Start: 2018-06-15

## 2018-06-15 RX ORDER — METOPROLOL TARTRATE 50 MG
1 TABLET ORAL
Qty: 60 | Refills: 0 | OUTPATIENT
Start: 2018-06-15

## 2018-06-15 RX ADMIN — Medication 3 MILLILITER(S): at 01:31

## 2018-06-15 RX ADMIN — Medication 100 MILLIGRAM(S): at 06:05

## 2018-06-15 RX ADMIN — Medication 40 MILLIGRAM(S): at 06:05

## 2018-06-15 RX ADMIN — AMLODIPINE BESYLATE 5 MILLIGRAM(S): 2.5 TABLET ORAL at 06:05

## 2018-06-15 RX ADMIN — Medication 75 MILLIGRAM(S): at 06:05

## 2018-06-15 RX ADMIN — Medication 81 MILLIGRAM(S): at 11:38

## 2018-06-15 RX ADMIN — Medication 650 MILLIGRAM(S): at 06:06

## 2018-06-15 RX ADMIN — ISOSORBIDE MONONITRATE 60 MILLIGRAM(S): 60 TABLET, EXTENDED RELEASE ORAL at 11:38

## 2018-06-15 RX ADMIN — Medication 75 MILLIGRAM(S): at 11:38

## 2018-06-15 NOTE — DISCHARGE NOTE ADULT - CARE PLAN
Principal Discharge DX:	Cardiomyopathy due to hypertension, with heart failure  Goal:	tight BP control  Assessment and plan of treatment:	follow up with GP in 1 week and titrate BP meds accordingly

## 2018-06-15 NOTE — DISCHARGE NOTE ADULT - HOSPITAL COURSE
HPI:  51 y/o male with pmh of asthma and having exarcerbation lately more and has finished several rounds of prednisone taper, last one finished 2 days ago. He started feeling tightness of his chest yesterday, gradually was getting worse, no aggravating factor, not relieved by taking his usual meds, associated with persistens dry cough. Also mentioned he could not lye flat yesterday due to shortness of breath  REVIEW OF SYSTEMS:  All other review of systems is negative unless indicated above.    PHYSICAL EXAM:    General: Well developed; obese; mildly dyspneic on conversation   Eyes: PERRLA, EOMI; conjunctiva and sclera clear  Head: Normocephalic; atraumatic  ENMT: No nasal discharge; airway clear  Neck: Supple; non tender; no masses  Respiratory: Decreased BS, + b/l wheezes  Cardiovascular: Regular rate and rhythm. S1 and S2 Normal; No murmurs  Gastrointestinal: Soft non-tender non-distended; Normal bowel sounds  Genitourinary: No costovertebral angle tenderness  Extremities: Normal range of motion, No clubbing, cyanosis or edema  Vascular: Peripheral pulses palpable 2+ bilaterallyNeurological: Alert and oriented x4, non focal   Skin: Warm and dry. No acute rash  Lymph Nodes: No acute cervical adenopathy  Musculoskeletal: Normal muscle  tone, without deformities  Psychiatric: Cooperative and appropriate                      14.0   16.38 )-----------( 306      ( 13 Jun 2018 10:36 )             42.8     13 Jun 2018 03:14    139    |  103    |  27     ----------------------------<  136    4.3     |  31     |  1.52 RADIOLOGY & ADDITIONAL TESTS:    EXAM:  US KIDNEYS AND BLADDER                          FINDINGS:  Right kidney:  10.7 cm. No renal mass, hydronephrosis or calculus.    Normal cortical echogenicity.  Left kidney:  11.8 cm. No renal mass, hydronephrosis or calculus.  Normal   cortical echogenicity.  Urinary bladder: Within normal limits.    IMPRESSION:   Unremarkable renal ultrasound.   Summary   The left atrium is severely dilated.   The left ventricle cavity is mild to moderately dilated. Left ventricle   systolic function appears severely impaired; segmental wall motion   abnormalities noted. Estimated Ejection Fraction is 25-30%.          Assessment and Plan:   · Assessment		  A/P  # Shortness of breath due to Acute respiratory failure due to acute decompensated systolic heart failure due to htn cardiomyopathy- EF 30%  - s/p LHC non obstructive pattern  - Subtle rise in Scr after LHC-stable Scr; DC with lasix 40 po qd        # ARF vs CKD III  Unknown baseline- likely has long standing hx of uncontrolled HTN which has contributed to his CKD  Was given gentle hydration to optimize for LHC  Renal US: nl      #Acute decompensated Systolic heart failure  restart on lasix after procedure-resolving  Daily weight   #Hypertensive urgency  Goal -160. Optimize BP as patient has likely lived with extremely high BPs and lowering them abruptly will cause hypoperfusion to end organs.   Will need slow titration of meds to optimize BP slowly. Pt has decided on Dr Clifton as PCP      #Elevated glucose, prediabetes  HgA1C  6.4  no metformin d/t renal impairment and chf. Start glimeperide 1mg po qd. ADA diet with fingetstick monitoring      #Positive troponin/Demand ischemia  Start BB, on ASA, statins   s.p LHC-> non obs cad. cont ASA statins      DC time 60 min. PCP aware

## 2018-06-15 NOTE — PROGRESS NOTE ADULT - SUBJECTIVE AND OBJECTIVE BOX
PCP:    REQUESTING PHYSICIAN:    REASON FOR CONSULT:    CHIEF COMPLAINT:    HPI:  HPI: 52 year old male with hx of asthma for 30 yrs, who presents with worsening SOB and chest tightness with short exertion and occasional at rest, Has attributed his symptoms to his asthma.  He currently is wheezing and yet is still SOB.  He reports having Pneumonia back in January and was treated by his asthma MD (Dr. Calix) for this and has not greatly improved. Reports multiple course of steroids since then. He has been a smoker for many years, but has not since February as his asthma bothers him.  Reports 2-3 pillow orthopnea, No PND, gets chest tightness at random times (rest or exertion).  Has been told he had high blood pressure in the past and was treated but then stopped the medication as it did not agree with him.  Reports high triglycerides in the past on a physical but not on treatment.  Does report snoring, but wife states no witness apneas.  Denies hx. of DM.    18: Breathing much better and sp/p CC.  non obstructive cad.  HTN cardiomyopathy.    18: Pt denies chest pain for shortness of breath. B/P not controlled.  6/15/18: Feels improved. Meds discussed. Opt f/u    PAST MEDICAL & SURGICAL HISTORY:  Asthma  No significant past surgical history      SOCIAL HISTORY:    FAMILY HISTORY:  Family history of melanoma (Mother)      ALLERGIES:  Allergies    No Known Allergies    Intolerances        MEDICATIONS:    MEDICATIONS  (STANDING):  ALBUTerol/ipratropium for Nebulization 3 milliLiter(s) Nebulizer every 6 hours  amLODIPine   Tablet 5 milliGRAM(s) Oral daily  amLODIPine   Tablet 5 milliGRAM(s) Oral <User Schedule>  aspirin enteric coated 81 milliGRAM(s) Oral daily  atorvastatin 10 milliGRAM(s) Oral at bedtime  buDESOnide 160 MICROgram(s)/formoterol 4.5 MICROgram(s) Inhaler 2 Puff(s) Inhalation two times a day  dextrose 5%. 1000 milliLiter(s) (50 mL/Hr) IV Continuous <Continuous>  dextrose 50% Injectable 12.5 Gram(s) IV Push once  dextrose 50% Injectable 25 Gram(s) IV Push once  dextrose 50% Injectable 25 Gram(s) IV Push once  furosemide    Tablet 40 milliGRAM(s) Oral daily  heparin  Injectable 5000 Unit(s) SubCutaneous every 8 hours  hydrALAZINE 75 milliGRAM(s) Oral every 8 hours  insulin lispro (HumaLOG) corrective regimen sliding scale   SubCutaneous three times a day before meals  isosorbide   mononitrate ER Tablet (IMDUR) 60 milliGRAM(s) Oral at bedtime  isosorbide   mononitrate ER Tablet (IMDUR) 60 milliGRAM(s) Oral daily  LORazepam     Tablet 1 milliGRAM(s) Oral two times a day  metoprolol succinate  milliGRAM(s) Oral two times a day  montelukast 10 milliGRAM(s) Oral at bedtime  nitroglycerin    2% Ointment 2 Inch(s) Transdermal four times a day  sodium chloride 0.9%. 1000 milliLiter(s) (50 mL/Hr) IV Continuous <Continuous>  tiotropium 18 MICROgram(s) Capsule 1 Capsule(s) Inhalation daily    MEDICATIONS  (PRN):  acetaminophen   Tablet. 650 milliGRAM(s) Oral every 6 hours PRN Mild Pain (1 - 3)  dextrose 40% Gel 15 Gram(s) Oral once PRN Blood Glucose LESS THAN 70 milliGRAM(s)/deciliter  glucagon  Injectable 1 milliGRAM(s) IntraMuscular once PRN Glucose LESS THAN 70 milligrams/deciliter      REVIEW OF SYSTEMS:    CONSTITUTIONAL: No weakness, fevers or chills  EYES/ENT: No visual changes;  No vertigo or throat pain   NECK: No pain or stiffness  RESPIRATORY: No cough, wheezing, hemoptysis; No shortness of breath  CARDIOVASCULAR: No chest pain or palpitations  GASTROINTESTINAL: No abdominal or epigastric pain. No nausea, vomiting, or hematemesis; No diarrhea or constipation. No melena or hematochezia.  GENITOURINARY: No dysuria, frequency or hematuria  NEUROLOGICAL: No numbness or weakness  SKIN: No itching, burning, rashes, or lesions   All other review of systems is negative unless indicated above    Vital Signs Last 24 Hrs  T(C): 36.6 (15 Julian 2018 12:20), Max: 37.2 (2018 20:09)  T(F): 97.8 (15 Julian 2018 12:20), Max: 99 (2018 20:09)  HR: 77 (15 Julian 2018 11:30) (74 - 98)  BP: 165/103 (15 Julian 2018 11:30) (141/85 - 172/103)  BP(mean): 117 (15 Julian 2018 11:30) (86 - 118)  RR: 17 (15 Julian 2018 10:00) (17 - 17)  SpO2: 98% (15 Julian 2018 10:00) (98% - 98%)Daily     Daily Weight in k.3 (15 Julian 2018 11:50)I&O's Summary      PHYSICAL EXAM:    Constitutional: NAD, awake and alert, well-developed  HEENT: PERR, EOMI,  No oral cyananosis.  Neck:  supple,  No JVD  Respiratory: Breath sounds are clear bilaterally, No wheezing, rales or rhonchi  Cardiovascular: S1 and S2, regular rate and rhythm, no Murmurs, gallops or rubs  Gastrointestinal: Bowel Sounds present, soft, nontender.   Extremities: No peripheral edema. No clubbing or cyanosis.  Vascular: 2+ peripheral pulses  Neurological: A/O x 3, no focal deficits  Musculoskeletal: no calf tenderness.  Skin: No rashes.      LABS: All Labs Reviewed:                        13.7   8.48  )-----------( 242      ( 15 Julian 2018 04:52 )             43.4                         14.1   12.65 )-----------( 297      ( 2018 04:22 )             44.2                         14.0   16.38 )-----------( 306      ( 2018 10:36 )             42.8     15 Julian 2018 04:52    138    |  101    |  29     ----------------------------<  97     3.6     |  31     |  1.82   2018 04:22    138    |  100    |  29     ----------------------------<  94     3.9     |  31     |  1.87   2018 03:14    139    |  103    |  27     ----------------------------<  136    4.3     |  31     |  1.52     Ca    8.2        15 Julian 2018 04:52  Ca    8.6        2018 04:22  Ca    8.6        2018 03:14  Phos  3.0       2018 04:22  Phos  4.0       2018 03:14    TPro  x      /  Alb  3.1    /  TBili  x      /  DBili  x      /  AST  x      /  ALT  x      /  AlkPhos  x      2018 04:22  TPro  x      /  Alb  3.0    /  TBili  x      /  DBili  x      /  AST  x      /  ALT  x      /  AlkPhos  x      2018 03:14          Blood Culture: Organism --  Gram Stain Blood -- Gram Stain --  Specimen Source .Blood None  Culture-Blood --            RADIOLOGY/EKG:      ECHO/CARDIAC CATHTERIZATION/STRESS TEST:
CC: shortness of breath (12 Jun 2018 10:57)    HPI:  51 y/o male with pmh of asthma and having exarcerbation lately more and has finished several rounds of prednisone taper, last one finished 2 days ago. He started feeling tightness of his chest yesterday, gradually was getting worse, no aggravating factor, not relieved by taking his usual meds, associated with persistens dry cough. Also mentioned he could not lye flat yesterday due to shortness of breath  -no fever, no runny nose (12 Jun 2018 10:57)    INTERVAL HPI/ OVERNIGHT EVENTS: Chart reviewed, Pt was seen and examined reports some improvement of SOB, no CP. Results and POC discussed. Pt awaiting for cardiac cath     Vital Signs Last 24 Hrs  T(C): 36 (13 Jun 2018 16:09), Max: 36.5 (13 Jun 2018 04:53)  T(F): 96.8 (13 Jun 2018 16:09), Max: 97.7 (13 Jun 2018 04:53)  HR: 72 (13 Jun 2018 18:05) (72 - 101)  BP: 159/98 (13 Jun 2018 18:05) (150/93 - 201/139)  BP(mean): 153 (13 Jun 2018 16:09) (111 - 153)  RR: 16 (13 Jun 2018 18:05) (11 - 29)  SpO2: 95% (13 Jun 2018 18:05) (92% - 97%)      REVIEW OF SYSTEMS:  All other review of systems is negative unless indicated above.    PHYSICAL EXAM:    General: Well developed; obese; mildly dyspneic on conversation   Eyes: PERRLA, EOMI; conjunctiva and sclera clear  Head: Normocephalic; atraumatic  ENMT: No nasal discharge; airway clear  Neck: Supple; non tender; no masses  Respiratory: Decreased BS, + b/l wheezes  Cardiovascular: Regular rate and rhythm. S1 and S2 Normal; No murmurs  Gastrointestinal: Soft non-tender non-distended; Normal bowel sounds  Genitourinary: No costovertebral angle tenderness  Extremities: Normal range of motion, No clubbing, cyanosis or edema  Vascular: Peripheral pulses palpable 2+ bilaterally  Neurological: Alert and oriented x4, non focal   Skin: Warm and dry. No acute rash  Lymph Nodes: No acute cervical adenopathy  Musculoskeletal: Normal muscle  tone, without deformities  Psychiatric: Cooperative and appropriate    LABS:  CARDIAC MARKERS ( 13 Jun 2018 10:36 )  0.066 ng/mL / x     / 164 U/L / x     / x      CARDIAC MARKERS ( 13 Jun 2018 03:14 )  0.067 ng/mL / x     / 153 U/L / x     / x      CARDIAC MARKERS ( 12 Jun 2018 19:54 )  0.079 ng/mL / x     / 177 U/L / x     / x      CARDIAC MARKERS ( 12 Jun 2018 17:15 )  0.084 ng/mL / x     / x     / x     / x      CARDIAC MARKERS ( 12 Jun 2018 12:16 )  0.122 ng/mL / x     / x     / x     / x      CARDIAC MARKERS ( 12 Jun 2018 08:58 )  0.168 ng/mL / x     / x     / x     / x                                14.0   16.38 )-----------( 306      ( 13 Jun 2018 10:36 )             42.8     13 Jun 2018 03:14    139    |  103    |  27     ----------------------------<  136    4.3     |  31     |  1.52     Ca    8.6        13 Jun 2018 03:14  Phos  4.0       13 Jun 2018 03:14    TPro  x      /  Alb  3.0    /  TBili  x      /  DBili  x      /  AST  x      /  ALT  x      /  AlkPhos  x      13 Jun 2018 03:14  PT/INR - ( 12 Jun 2018 08:58 )   PT: 11.8 sec;   INR: 1.09 ratio    PTT - ( 13 Jun 2018 10:35 )  PTT:30.3 sec    CAPILLARY BLOOD GLUCOSE  POCT Blood Glucose.: 328 mg/dL (13 Jun 2018 12:03)  POCT Blood Glucose.: 117 mg/dL (13 Jun 2018 07:48)  POCT Blood Glucose.: 145 mg/dL (12 Jun 2018 21:06)    LIVER FUNCTIONS - ( 13 Jun 2018 03:14 )  Alb: 3.0 g/dL / Pro: x     / ALK PHOS: x     / ALT: x     / AST: x     / GGT: x             Hemoglobin A1C, Whole Blood: 6.2 % (06-13-18 @ 03:14)  Hemoglobin A1C, Whole Blood: 6.4 % (06-12-18 @ 12:16)    MEDICATIONS  (STANDING):  ALBUTerol    90 MICROgram(s) HFA Inhaler 1 Puff(s) Inhalation every 4 hours  ALBUTerol/ipratropium for Nebulization 3 milliLiter(s) Nebulizer every 6 hours  amLODIPine   Tablet 5 milliGRAM(s) Oral <User Schedule>  aspirin enteric coated 81 milliGRAM(s) Oral daily  atorvastatin 10 milliGRAM(s) Oral at bedtime  dextrose 5%. 1000 milliLiter(s) (50 mL/Hr) IV Continuous <Continuous>  dextrose 50% Injectable 12.5 Gram(s) IV Push once  dextrose 50% Injectable 25 Gram(s) IV Push once  dextrose 50% Injectable 25 Gram(s) IV Push once  furosemide   Injectable 40 milliGRAM(s) IV Push once  hydrALAZINE 75 milliGRAM(s) Oral every 8 hours  insulin lispro (HumaLOG) corrective regimen sliding scale   SubCutaneous three times a day before meals  loratadine 10 milliGRAM(s) Oral daily  metoprolol succinate ER 50 milliGRAM(s) Oral two times a day  montelukast 10 milliGRAM(s) Oral at bedtime  nitroglycerin    2% Ointment 2 Inch(s) Transdermal four times a day  tiotropium 18 MICROgram(s) Capsule 1 Capsule(s) Inhalation daily    MEDICATIONS  (PRN):  acetaminophen   Tablet. 650 milliGRAM(s) Oral every 6 hours PRN Mild Pain (1 - 3)  dextrose 40% Gel 15 Gram(s) Oral once PRN Blood Glucose LESS THAN 70 milliGRAM(s)/deciliter  glucagon  Injectable 1 milliGRAM(s) IntraMuscular once PRN Glucose LESS THAN 70 milligrams/deciliter  heparin  Injectable 6000 Unit(s) IV Push every 6 hours PRN For aPTT less than 40      RADIOLOGY & ADDITIONAL TESTS:    EXAM:  US KIDNEYS AND BLADDER                          FINDINGS:  Right kidney:  10.7 cm. No renal mass, hydronephrosis or calculus.    Normal cortical echogenicity.  Left kidney:  11.8 cm. No renal mass, hydronephrosis or calculus.  Normal   cortical echogenicity.  Urinary bladder: Within normal limits.    IMPRESSION:   Unremarkable renal ultrasound.         EXAM:  2D ECHOCARDIOGRAM AD    Transthoracic Echocardiography Report (TTE)    < from: Transthoracic Echocardiogram (06.12.18 @ 11:53) >   Findings     Mitral Valve   Fibrocalcific changes noted to the mitral valve leaflets with preserved   leaflet excursion. Trace mitral regurgitation is present.     Aortic Valve   Mild fibrocalcific changes noted to the Aortic valve leaflets with   preserved leaflet excursion. No aortic regurgitation is present.     Tricuspid Valve   The tricuspid valve leaflets are well seen and appear thin and pliable   with preserved leaflets excursion. Trace tricuspid regurgitation noted.     Pulmonic Valve   The pulmonic valve is not well seen. No pulmonic regurgitation noted.     Left Atrium   The left atrium is severely dilated.     Left Ventricle   The left ventricle cavity is mild to moderately dilated.Left ventricle   systolic function appears severely impaired; segmental wall motion   abnormalities noted. Estimated Ejection Fraction is 30%.     Right Atrium   Normal appearing right atrium.     Right Ventricle   Normal appearing right ventricle structure and function.     Pericardial Effusion   No evidence of pericardial effusion.     Pleural Effusion   No evidence of pleural effusion.     Miscellaneous   All visualized extra cardiac structures appears to be normal.     Summary   The left atrium is severely dilated.   The left ventricle cavity is mild to moderately dilated. Left ventricle   systolic function appears severely impaired; segmental wall motion   abnormalities noted. Estimated Ejection Fraction is 25-30%.
CC: shortness of breath (12 Jun 2018 10:57)    HPI:  53 y/o male with pmh of asthma and having exarcerbation lately more and has finished several rounds of prednisone taper, last one finished 2 days ago. He started feeling tightness of his chest yesterday, gradually was getting worse, no aggravating factor, not relieved by taking his usual meds, associated with persistens dry cough. Also mentioned he could not lye flat yesterday due to shortness of breath  -no fever, no runny nose (12 Jun 2018 10:57)    INTERVAL HPI/ OVERNIGHT EVENTS: Chart reviewed, Pt was seen and examined reports some improvement of SOB, no CP. Able to lay flat and deneis SOB however has dry cough. Scr slightly elevated. SBP high 170s    ICU Vital Signs Last 24 Hrs  T(C): 37.2 (14 Jun 2018 11:55), Max: 37.7 (13 Jun 2018 23:18)  T(F): 99 (14 Jun 2018 11:55), Max: 99.9 (13 Jun 2018 23:18)  HR: 96 (14 Jun 2018 10:00) (70 - 99)  BP: 169/99 (14 Jun 2018 10:00) (146/96 - 201/139)  BP(mean): 115 (14 Jun 2018 10:00) (98 - 153)  ABP: --  ABP(mean): --  RR: 33 (14 Jun 2018 01:00) (11 - 35)  SpO2: 96% (13 Jun 2018 18:20) (92% - 97%)        REVIEW OF SYSTEMS:  All other review of systems is negative unless indicated above.    PHYSICAL EXAM:    General: Well developed; obese; mildly dyspneic on conversation   Eyes: PERRLA, EOMI; conjunctiva and sclera clear  Head: Normocephalic; atraumatic  ENMT: No nasal discharge; airway clear  Neck: Supple; non tender; no masses  Respiratory: Decreased BS, + b/l wheezes  Cardiovascular: Regular rate and rhythm. S1 and S2 Normal; No murmurs  Gastrointestinal: Soft non-tender non-distended; Normal bowel sounds  Genitourinary: No costovertebral angle tenderness  Extremities: Normal range of motion, No clubbing, cyanosis or edema  Vascular: Peripheral pulses palpable 2+ bilaterally  Neurological: Alert and oriented x4, non focal   Skin: Warm and dry. No acute rash  Lymph Nodes: No acute cervical adenopathy  Musculoskeletal: Normal muscle  tone, without deformities  Psychiatric: Cooperative and appropriate    LABS:  CARDIAC MARKERS ( 13 Jun 2018 10:36 )  0.066 ng/mL / x     / 164 U/L / x     / x      CARDIAC MARKERS ( 13 Jun 2018 03:14 )  0.067 ng/mL / x     / 153 U/L / x     / x      CARDIAC MARKERS ( 12 Jun 2018 19:54 )  0.079 ng/mL / x     / 177 U/L / x     / x      CARDIAC MARKERS ( 12 Jun 2018 17:15 )  0.084 ng/mL / x     / x     / x     / x      CARDIAC MARKERS ( 12 Jun 2018 12:16 )  0.122 ng/mL / x     / x     / x     / x      CARDIAC MARKERS ( 12 Jun 2018 08:58 )  0.168 ng/mL / x     / x     / x     / x                                14.0   16.38 )-----------( 306      ( 13 Jun 2018 10:36 )             42.8     13 Jun 2018 03:14    139    |  103    |  27     ----------------------------<  136    4.3     |  31     |  1.52     Ca    8.6        13 Jun 2018 03:14  Phos  4.0       13 Jun 2018 03:14    TPro  x      /  Alb  3.0    /  TBili  x      /  DBili  x      /  AST  x      /  ALT  x      /  AlkPhos  x      13 Jun 2018 03:14  PT/INR - ( 12 Jun 2018 08:58 )   PT: 11.8 sec;   INR: 1.09 ratio    PTT - ( 13 Jun 2018 10:35 )  PTT:30.3 sec    CAPILLARY BLOOD GLUCOSE  POCT Blood Glucose.: 328 mg/dL (13 Jun 2018 12:03)  POCT Blood Glucose.: 117 mg/dL (13 Jun 2018 07:48)  POCT Blood Glucose.: 145 mg/dL (12 Jun 2018 21:06)    LIVER FUNCTIONS - ( 13 Jun 2018 03:14 )  Alb: 3.0 g/dL / Pro: x     / ALK PHOS: x     / ALT: x     / AST: x     / GGT: x             Hemoglobin A1C, Whole Blood: 6.2 % (06-13-18 @ 03:14)  Hemoglobin A1C, Whole Blood: 6.4 % (06-12-18 @ 12:16)    MEDICATIONS  (STANDING):  ALBUTerol    90 MICROgram(s) HFA Inhaler 1 Puff(s) Inhalation every 4 hours  ALBUTerol/ipratropium for Nebulization 3 milliLiter(s) Nebulizer every 6 hours  amLODIPine   Tablet 5 milliGRAM(s) Oral <User Schedule>  aspirin enteric coated 81 milliGRAM(s) Oral daily  atorvastatin 10 milliGRAM(s) Oral at bedtime  dextrose 5%. 1000 milliLiter(s) (50 mL/Hr) IV Continuous <Continuous>  dextrose 50% Injectable 12.5 Gram(s) IV Push once  dextrose 50% Injectable 25 Gram(s) IV Push once  dextrose 50% Injectable 25 Gram(s) IV Push once  furosemide   Injectable 40 milliGRAM(s) IV Push once  hydrALAZINE 75 milliGRAM(s) Oral every 8 hours  insulin lispro (HumaLOG) corrective regimen sliding scale   SubCutaneous three times a day before meals  loratadine 10 milliGRAM(s) Oral daily  metoprolol succinate ER 50 milliGRAM(s) Oral two times a day  montelukast 10 milliGRAM(s) Oral at bedtime  nitroglycerin    2% Ointment 2 Inch(s) Transdermal four times a day  tiotropium 18 MICROgram(s) Capsule 1 Capsule(s) Inhalation daily    MEDICATIONS  (PRN):  acetaminophen   Tablet. 650 milliGRAM(s) Oral every 6 hours PRN Mild Pain (1 - 3)  dextrose 40% Gel 15 Gram(s) Oral once PRN Blood Glucose LESS THAN 70 milliGRAM(s)/deciliter  glucagon  Injectable 1 milliGRAM(s) IntraMuscular once PRN Glucose LESS THAN 70 milligrams/deciliter  heparin  Injectable 6000 Unit(s) IV Push every 6 hours PRN For aPTT less than 40      RADIOLOGY & ADDITIONAL TESTS:    EXAM:  US KIDNEYS AND BLADDER                          FINDINGS:  Right kidney:  10.7 cm. No renal mass, hydronephrosis or calculus.    Normal cortical echogenicity.  Left kidney:  11.8 cm. No renal mass, hydronephrosis or calculus.  Normal   cortical echogenicity.  Urinary bladder: Within normal limits.    IMPRESSION:   Unremarkable renal ultrasound.         EXAM:  2D ECHOCARDIOGRAM AD    Transthoracic Echocardiography Report (TTE)    < from: Transthoracic Echocardiogram (06.12.18 @ 11:53) >   Findings     Mitral Valve   Fibrocalcific changes noted to the mitral valve leaflets with preserved   leaflet excursion. Trace mitral regurgitation is present.     Aortic Valve   Mild fibrocalcific changes noted to the Aortic valve leaflets with   preserved leaflet excursion. No aortic regurgitation is present.     Tricuspid Valve   The tricuspid valve leaflets are well seen and appear thin and pliable   with preserved leaflets excursion. Trace tricuspid regurgitation noted.     Pulmonic Valve   The pulmonic valve is not well seen. No pulmonic regurgitation noted.     Left Atrium   The left atrium is severely dilated.     Left Ventricle   The left ventricle cavity is mild to moderately dilated.Left ventricle   systolic function appears severely impaired; segmental wall motion   abnormalities noted. Estimated Ejection Fraction is 30%.     Right Atrium   Normal appearing right atrium.     Right Ventricle   Normal appearing right ventricle structure and function.     Pericardial Effusion   No evidence of pericardial effusion.     Pleural Effusion   No evidence of pleural effusion.     Miscellaneous   All visualized extra cardiac structures appears to be normal.     Summary   The left atrium is severely dilated.   The left ventricle cavity is mild to moderately dilated. Left ventricle   systolic function appears severely impaired; segmental wall motion   abnormalities noted. Estimated Ejection Fraction is 25-30%.
HPI: 52 year old male with hx of asthma for 30 yrs, who presents with worsening SOB and chest tightness with short exertion and occasional at rest, Has attributed his symptoms to his asthma.  He currently is wheezing and yet is still SOB.  He reports having Pneumonia back in January and was treated by his asthma MD (Dr. Calix) for this and has not greatly improved. Reports multiple course of steroids since then. He has been a smoker for many years, but has not since February as his asthma bothers him.  Reports 2-3 pillow orthopnea, No PND, gets chest tightness at random times (rest or exertion).  Has been told he had high blood pressure in the past and was treated but then stopped the medication as it did not agree with him.  Reports high triglycerides in the past on a physical but not on treatment.  Does report snoring, but wife states no witness apneas.  Denies hx. of DM.    18: Breathing much better and sp/p CC.  non obstructive cad.  HTN cardiomyopathy.      PAST MEDICAL & SURGICAL HISTORY:  Asthma  Psoriasis  No significant past surgical history      SOCIAL HISTORY: Current intermitent Smoker/Social ETOH/ No Ilicit Drug use./ works as a project supervisor.    FAMILY HISTORY:  Family history of melanoma (Mother)  No CAD in family hx.    Allergies  No Known Allergies    Home Medications:  albuterol/singulair/prednisone    REVIEW OF SYSTEMS: 13 systems were reviewed and all negative except for comments above.    MEDICATIONS  (STANDING):  ALBUTerol    90 MICROgram(s) HFA Inhaler 1 Puff(s) Inhalation every 4 hours  ALBUTerol/ipratropium for Nebulization 3 milliLiter(s) Nebulizer every 6 hours  amLODIPine   Tablet 5 milliGRAM(s) Oral <User Schedule>  aspirin enteric coated 81 milliGRAM(s) Oral daily  atorvastatin 10 milliGRAM(s) Oral at bedtime  dextrose 5%. 1000 milliLiter(s) (50 mL/Hr) IV Continuous <Continuous>  dextrose 50% Injectable 12.5 Gram(s) IV Push once  dextrose 50% Injectable 25 Gram(s) IV Push once  dextrose 50% Injectable 25 Gram(s) IV Push once  furosemide   Injectable 40 milliGRAM(s) IV Push once  hydrALAZINE 75 milliGRAM(s) Oral every 8 hours  insulin lispro (HumaLOG) corrective regimen sliding scale   SubCutaneous three times a day before meals  loratadine 10 milliGRAM(s) Oral daily  metoprolol succinate ER 50 milliGRAM(s) Oral two times a day  montelukast 10 milliGRAM(s) Oral at bedtime  nitroglycerin    2% Ointment 2 Inch(s) Transdermal four times a day  tiotropium 18 MICROgram(s) Capsule 1 Capsule(s) Inhalation daily    MEDICATIONS  (PRN):  acetaminophen   Tablet. 650 milliGRAM(s) Oral every 6 hours PRN Mild Pain (1 - 3)  dextrose 40% Gel 15 Gram(s) Oral once PRN Blood Glucose LESS THAN 70 milliGRAM(s)/deciliter  glucagon  Injectable 1 milliGRAM(s) IntraMuscular once PRN Glucose LESS THAN 70 milligrams/deciliter  heparin  Injectable 6000 Unit(s) IV Push every 6 hours PRN For aPTT less than 40      Vital Signs Last 24 Hrs  T(C): 36 (2018 16:09), Max: 36.5 (2018 04:53)  T(F): 96.8 (2018 16:09), Max: 97.7 (2018 04:53)  HR: 70 (2018 18:20) (70 - 101)  BP: 164/98 (2018 18:20) (150/93 - 201/139)  BP(mean): 153 (2018 16:09) (111 - 153)  RR: 16 (2018 18:20) (11 - 29)  SpO2: 96% (2018 18:20) (92% - 97%)    I&O's Detail    2018 07:01  -  2018 07:00  --------------------------------------------------------  IN:    heparin  Infusion.: 70 mL    heparin  Infusion.: 180 mL  Total IN: 250 mL    OUT:    Voided: 1000 mL  Total OUT: 1000 mL    Total NET: -750 mL          Daily     Daily Weight in k.1 (2018 04:53)  PHYSICAL EXAM:  Constitutional: Mildly tachypneic, awake and alert, well-developed, sitting upright in stretcher in ER as cannot lie down  HEENT: PERRLA, EOMI,  No oral cyanosis. Oropharynx Clean and Dry.  Neck:  supple, JVP 5-6, No Thyroid enlargement. No Carotid Bruits bilaterally.  Respiratory: Breath sounds slightly ronchurus, No wheezing, scant rales and rhonchi at bases.  Cardiovascular: NL S1 and S2, RRR, +s4, 1-2/6 REINALDO LLSB  Gastrointestinal: Bowel Sounds present, soft, obese  Extremities: No peripheral edema. No clubbing or cyanosis.    Vascular: 2+ peripheral pulses in LE   Neurological: A/O x 3, no focal motor deficits  Musculoskeletal: no calf tenderness.  Skin: No rashes.      LABS: All Labs Reviewed:                        14.0   16.38 )-----------( 306      ( 2018 10:36 )             42.8     06-13    139  |  103  |  27<H>  ----------------------------<  136<H>  4.3   |  31  |  1.52<H>    Ca    8.6      2018 03:14  Phos  4.0     06-13    TPro  x   /  Alb  3.0<L>  /  TBili  x   /  DBili  x   /  AST  x   /  ALT  x   /  AlkPhos  x   06-13    CARDIAC MARKERS ( 2018 10:36 )  0.066 ng/mL / x     / 164 U/L / x     / x      CARDIAC MARKERS ( 2018 03:14 )  0.067 ng/mL / x     / 153 U/L / x     / x      CARDIAC MARKERS ( 2018 19:54 )  0.079 ng/mL / x     / 177 U/L / x     / x      CARDIAC MARKERS ( 2018 17:15 )  0.084 ng/mL / x     / x     / x     / x      CARDIAC MARKERS ( 2018 12:16 )  0.122 ng/mL / x     / x     / x     / x      CARDIAC MARKERS ( 2018 08:58 )  0.168 ng/mL / x     / x     / x     / x          LIVER FUNCTIONS - ( 2018 03:14 )  Alb: 3.0 g/dL / Pro: x     / ALK PHOS: x     / ALT: x     / AST: x     / GGT: x           PT/INR - ( 2018 08:58 )   PT: 11.8 sec;   INR: 1.09 ratio         PTT - ( 2018 10:35 )  PTT:30.3 sec   Chol 171  HDL 41 Trig 95    - @ 08:58  Pro Bnp [id number]      RADIOLOGY:    < from: Xray Chest 1 View AP/PA. (18 @ 09:57) >  PROCEDURE DATE:  2018          INTERPRETATION:  Chest portable semierect single AP view:    Clinical history:    Shortness of breath. Chest pain. History of Fosamax marked.    Findings:    No prior radiographs available for comparison.    Cardiac size appears normal. Aorta is within normal limits. Lungs show   diffuse bilateral interstitial and airspace increased densities. Etiology   uncertain. May represent a diffuse lung disease.    If clinically indicated, if clinically warranted, further evaluation by    CT scan of the higher resolution low-dose chest recommended.    Impression:    Diffuse interstitial and airspace disease. Etiology uncertain. Further   evaluation requested.    < end of copied text >    EKG: NSR, LAE, LVH with repolarization abnormality (ST depressions laterally) (cannot r/o ischemia)    < from: Transthoracic Echocardiogram (18 @ 11:53) >  M-Mode Measurements (cm)     LVEDd: 6.55 cm                       LVESd: 5.37 cm   IVSEd: 1.61 cm   LVPWd: 1.49 cm                       AO Root Dimension: 3.5 cm   % Ejection Fraction: 30 %            ACS: 2 cm                             LA: 5.3 cm                                        LVOT: 2.2 cm    Doppler Measurements:     AV Velocity:113 cm/s   AV Peak Gradient: 5.11 mmHg     TR Velocity:208 cm/s   TR Gradient:17.3056 mmHg   Estimated RAP:10 mmHg   RVSP:28 mmHg     Findings     Mitral Valve   Fibrocalcific changes noted to the mitral valve leaflets with preserved   leaflet excursion. Trace mitral regurgitation is present.     Aortic Valve   Mild fibrocalcific changes noted to the Aortic valve leaflets with   preserved leaflet excursion. No aortic regurgitation is present.     Tricuspid Valve   The tricuspid valve leaflets are well seen and appear thin and pliable   with preserved leaflets excursion. Trace tricuspid regurgitation noted.     Pulmonic Valve   The pulmonic valve is not well seen. No pulmonic regurgitation noted.     Left Atrium   The left atrium is severely dilated.     Left Ventricle   The left ventricle cavity is mild to moderately dilated. Left ventricle   systolic functionappears severely impaired; segmental wall motion   abnormalities noted. Estimated Ejection Fraction is 30%.     Right Atrium   Normal appearing right atrium.     Right Ventricle   Normal appearing right ventricle structure and function.     Pericardial Effusion   No evidence of pericardial effusion.     Pleural Effusion   No evidence of pleural effusion.     Miscellaneous   All visualized extra cardiac structures appears to be normal.     Impression     Summary     The left atrium is severely dilated.   The left ventricle cavity is mild to moderately dilated. Left ventricle   systolic function appears severely impaired; segmental wall motion   abnormalities noted. Estimated Ejection Fraction is 25-30%.    < end of copied text >      < from: Cardiac Cath Lab - Adult (18 @ 16:48) >   Angiographic Findings     Cardiac Arteries and Lesion Findings    LMCA: Diffuse irregularity.There is mild diffuse disease noted.    LAD: Diffuse irregularity.There is mild diffuse disease noted.    LCx: Diffuse irregularity.There is mild diffuse disease noted.    RCA: Diffuse irregularity.There is mild diffuse disease noted.    Valves  +--------------+----------------+---------------------+--------------------  +  !Valve         !Stenosis        !Insufficiency        !Comments              !  +--------------+----------------+---------------------+--------------------  +  !Aortic        !No              !Not assessed         !                      !  +--------------+----------------+---------------------+--------------------  +    LV function assessed as:Abnormal.  Ejection Fraction  +----------------------------------------------------------------------+---  +  !Method                                                                  !EF%!  +----------------------------------------------------------------------+---  +  !LV gram                                                                 !30 !  +----------------------------------------------------------------------+---  +    VA  Ventriculography Findings:  Severely reduced left ventricular systolic function.     Impression     Diagnostic Conclusions     Non-Obstructive CAD with severely reduced left ventricular function.     Recommendations     Manage with medical therapy.      < end of copied text >
NEPHROLOGY INTERVAL HPI/OVERNIGHT EVENTS:   SY  No acute events overnight.  No new complaints.    HPI:  53 y/o male with pmh of asthma and having exarcerbation lately more and has finished several rounds of prednisone taper, last one finished 2 days ago. He started feeling tightness of his chest yesterday, gradually was getting worse, no aggravating factor, not relieved by taking his usual meds, associated with persistens dry cough. Also mentioned he could not lye flat yesterday due to shortness of breath.  Pt states has been to Dr Pablo gould , placed on antihypertensives, did not feels well, stopped them.  seen by Dr Winifred Christina in the past, was on meds, insurance changed , did not follow up, seen by allergy/ pulmonology, who has been urging him to see an internist for a long time.   No knowledge of any renal issues  found to have mild elevated troponin elevation and BP greater than 200, 100, creat 1.63  for cardiac cath in am, risks of cath explained to pt, his wife and daughter in the room        PAST MEDICAL & SURGICAL HISTORY:  Asthma  No significant past surgical history      MEDICATIONS  (STANDING):  ALBUTerol    90 MICROgram(s) HFA Inhaler 1 Puff(s) Inhalation every 4 hours  ALBUTerol/ipratropium for Nebulization 3 milliLiter(s) Nebulizer every 6 hours  amLODIPine   Tablet 5 milliGRAM(s) Oral daily  aspirin enteric coated 81 milliGRAM(s) Oral daily  clopidogrel Tablet 75 milliGRAM(s) Oral daily  dextrose 5%. 1000 milliLiter(s) (50 mL/Hr) IV Continuous <Continuous>  dextrose 50% Injectable 12.5 Gram(s) IV Push once  dextrose 50% Injectable 25 Gram(s) IV Push once  dextrose 50% Injectable 25 Gram(s) IV Push once  furosemide   Injectable 40 milliGRAM(s) IV Push every 12 hours  heparin  Infusion. 1300 Unit(s)/Hr (13 mL/Hr) IV Continuous <Continuous>  hydrALAZINE 50 milliGRAM(s) Oral every 8 hours  insulin lispro (HumaLOG) corrective regimen sliding scale   SubCutaneous three times a day before meals  isosorbide   mononitrate ER Tablet (IMDUR) 30 milliGRAM(s) Oral at bedtime  loratadine 10 milliGRAM(s) Oral daily  metoprolol tartrate 25 milliGRAM(s) Oral every 12 hours  montelukast 10 milliGRAM(s) Oral at bedtime  nitroglycerin    2% Ointment 2 Inch(s) Transdermal four times a day  tiotropium 18 MICROgram(s) Capsule 1 Capsule(s) Inhalation daily    MEDICATIONS  (PRN):  acetaminophen   Tablet. 650 milliGRAM(s) Oral every 6 hours PRN Mild Pain (1 - 3)  dextrose 40% Gel 15 Gram(s) Oral once PRN Blood Glucose LESS THAN 70 milliGRAM(s)/deciliter  glucagon  Injectable 1 milliGRAM(s) IntraMuscular once PRN Glucose LESS THAN 70 milligrams/deciliter  heparin  Injectable 6000 Unit(s) IV Push every 6 hours PRN For aPTT less than 40          Vital Signs Last 24 Hrs  T(C): 36.5 (2018 04:53), Max: 36.9 (2018 15:21)  T(F): 97.7 (2018 04:53), Max: 98.4 (2018 15:21)  HR: 91 (2018 10:06) (83 - 101)  BP: 177/104 (2018 10:06) (157/96 - 212/151)  BP(mean): 123 (2018 07:30) (111 - 126)  RR: 25 (2018 10:06) (11 - 29)  SpO2: 97% (2018 21:00) (92% - 97%)  Daily     Daily Weight in k.1 (2018 04:53)    06-12 @ 07:01  -  06-13 @ 07:00  --------------------------------------------------------  IN: 250 mL / OUT: 1000 mL / NET: -750 mL        PHYSICAL EXAM:  Alert and appropriate  GENERAL: No distress  CHEST/LUNG: Right base crackles.  HEART: S1S2 RRR  ABDOMEN: soft  EXTREMITIES: no edema  SKIN:     LABS:                        13.0   11.77 )-----------( 266      ( 2018 03:14 )             39.8     13    139  |  103  |  27<H>  ----------------------------<  136<H>  4.3   |  31  |  1.52<H>    Ca    8.6      2018 03:14  Phos  4.0         TPro  x   /  Alb  3.0<L>  /  TBili  x   /  DBili  x   /  AST  x   /  ALT  x   /  AlkPhos  x       PT/INR - ( 2018 08:58 )   PT: 11.8 sec;   INR: 1.09 ratio         PTT - ( 2018 03:14 )  PTT:53.0 sec    Phosphorus Level, Serum: 4.0 mg/dL ( @ 03:14)      RADIOLOGY & ADDITIONAL TESTS:    < from: Xray Chest 1 View AP/PA. (18 @ 09:57) >  EXAM:  XR CHEST AP OR PA 1V                            PROCEDURE DATE:  2018          INTERPRETATION:  Chest portable semierect single AP view:    Clinical history:    Shortness of breath. Chest pain. History of Fosamax marked.    Findings:    No prior radiographs available for comparison.    Cardiac size appears normal. Aorta is within normal limits. Lungs show   diffuse bilateral interstitial and airspace increased densities. Etiology   uncertain. May represent a diffuse lung disease.    If clinically indicated, if clinically warranted, further evaluation by    CT scan of the higher resolution low-dose chest recommended.    Impression:    Diffuse interstitial and airspace disease. Etiology uncertain. Further   evaluation requested.                BLANCA URIARTE M.D., ATTENDING RADIOLOGIST  This document has been electronically signed. 2018 10:26AM                < end of copied text >
NEPHROLOGY INTERVAL HPI/OVERNIGHT EVENTS:   SY  Post Cardiac cath.  Notable for EF of 30% and non obstructive disease.     SY  No acute events overnight.  No new complaints.    HPI:  51 y/o male with pmh of asthma and having exarcerbation lately more and has finished several rounds of prednisone taper, last one finished 2 days ago. He started feeling tightness of his chest yesterday, gradually was getting worse, no aggravating factor, not relieved by taking his usual meds, associated with persistens dry cough. Also mentioned he could not lye flat yesterday due to shortness of breath.  Pt states has been to Dr Pablo gould , placed on antihypertensives, did not feels well, stopped them.  seen by Dr Winifred Christina in the past, was on meds, insurance changed , did not follow up, seen by allergy/ pulmonology, who has been urging him to see an internist for a long time.   No knowledge of any renal issues  found to have mild elevated troponin elevation and BP greater than 200, 100, creat 1.63  for cardiac cath in am, risks of cath explained to pt, his wife and daughter in the room        PAST MEDICAL & SURGICAL HISTORY:  Asthma  No significant past surgical history    MEDICATIONS  (STANDING):  ALBUTerol/ipratropium for Nebulization 3 milliLiter(s) Nebulizer every 6 hours  amLODIPine   Tablet 5 milliGRAM(s) Oral daily  amLODIPine   Tablet 5 milliGRAM(s) Oral <User Schedule>  aspirin enteric coated 81 milliGRAM(s) Oral daily  atorvastatin 10 milliGRAM(s) Oral at bedtime  buDESOnide 160 MICROgram(s)/formoterol 4.5 MICROgram(s) Inhaler 2 Puff(s) Inhalation two times a day  dextrose 5%. 1000 milliLiter(s) (50 mL/Hr) IV Continuous <Continuous>  dextrose 50% Injectable 12.5 Gram(s) IV Push once  dextrose 50% Injectable 25 Gram(s) IV Push once  dextrose 50% Injectable 25 Gram(s) IV Push once  furosemide    Tablet 40 milliGRAM(s) Oral two times a day  hydrALAZINE 75 milliGRAM(s) Oral every 8 hours  insulin lispro (HumaLOG) corrective regimen sliding scale   SubCutaneous three times a day before meals  loratadine 10 milliGRAM(s) Oral daily  metoprolol succinate ER 50 milliGRAM(s) Oral two times a day  montelukast 10 milliGRAM(s) Oral at bedtime  nitroglycerin    2% Ointment 2 Inch(s) Transdermal four times a day  sodium chloride 0.9%. 1000 milliLiter(s) (50 mL/Hr) IV Continuous <Continuous>  tiotropium 18 MICROgram(s) Capsule 1 Capsule(s) Inhalation daily    MEDICATIONS  (PRN):  acetaminophen   Tablet. 650 milliGRAM(s) Oral every 6 hours PRN Mild Pain (1 - 3)  dextrose 40% Gel 15 Gram(s) Oral once PRN Blood Glucose LESS THAN 70 milliGRAM(s)/deciliter  glucagon  Injectable 1 milliGRAM(s) IntraMuscular once PRN Glucose LESS THAN 70 milligrams/deciliter  heparin  Injectable 6000 Unit(s) IV Push every 6 hours PRN For aPTT less than 40          Vital Signs Last 24 Hrs  T(C): 37.2 (2018 06:24), Max: 37.7 (2018 23:18)  T(F): 99 (2018 06:24), Max: 99.9 (2018 23:18)  HR: 85 (2018 05:00) (70 - 99)  BP: 176/105 (2018 05:00) (146/96 - 201/139)  BP(mean): 121 (2018 05:00) (98 - 153)  RR: 33 (2018 01:00) (11 - 35)  SpO2: 96% (2018 18:20) (92% - 97%)  Daily     Daily Weight in k.3 (2018 06:24)    06-13 @ 07:01  -  06-14 @ 07:00  --------------------------------------------------------  IN: 0 mL / OUT: 2400 mL / NET: -2400 mL        PHYSICAL EXAM:  Alert and appropriate  GENERAL: No distress  CHEST/LUNG: right sided crackles.  HEART: S1S2 RRR  ABDOMEN: soft  EXTREMITIES: no edema  SKIN:     LABS:                        14.1   12.65 )-----------( 297      ( 2018 04:22 )             44.2         138  |  100  |  29<H>  ----------------------------<  94  3.9   |  31  |  1.87<H>    Ca    8.6      2018 04:22  Phos  3.0         TPro  x   /  Alb  3.1<L>  /  TBili  x   /  DBili  x   /  AST  x   /  ALT  x   /  AlkPhos  x       PTT - ( 2018 10:35 )  PTT:30.3 sec    Phosphorus Level, Serum: 3.0 mg/dL ( @ 04:22)          RADIOLOGY & ADDITIONAL TESTS:
PCP:    REQUESTING PHYSICIAN:    REASON FOR CONSULT:    CHIEF COMPLAINT:    HPI:  HPI: 52 year old male with hx of asthma for 30 yrs, who presents with worsening SOB and chest tightness with short exertion and occasional at rest, Has attributed his symptoms to his asthma.  He currently is wheezing and yet is still SOB.  He reports having Pneumonia back in January and was treated by his asthma MD (Dr. Calix) for this and has not greatly improved. Reports multiple course of steroids since then. He has been a smoker for many years, but has not since February as his asthma bothers him.  Reports 2-3 pillow orthopnea, No PND, gets chest tightness at random times (rest or exertion).  Has been told he had high blood pressure in the past and was treated but then stopped the medication as it did not agree with him.  Reports high triglycerides in the past on a physical but not on treatment.  Does report snoring, but wife states no witness apneas.  Denies hx. of DM.    18: Breathing much better and sp/p CC.  non obstructive cad.  HTN cardiomyopathy.    18: Pt denies chest pain for shortness of breath. B/P not controlled.    PAST MEDICAL & SURGICAL HISTORY:  Asthma  No significant past surgical history      SOCIAL HISTORY:    FAMILY HISTORY:  Family history of melanoma (Mother)      ALLERGIES:  Allergies    No Known Allergies    Intolerances        MEDICATIONS:    MEDICATIONS  (STANDING):  ALBUTerol/ipratropium for Nebulization 3 milliLiter(s) Nebulizer every 6 hours  amLODIPine   Tablet 5 milliGRAM(s) Oral daily  amLODIPine   Tablet 5 milliGRAM(s) Oral <User Schedule>  aspirin enteric coated 81 milliGRAM(s) Oral daily  atorvastatin 10 milliGRAM(s) Oral at bedtime  buDESOnide 160 MICROgram(s)/formoterol 4.5 MICROgram(s) Inhaler 2 Puff(s) Inhalation two times a day  dextrose 5%. 1000 milliLiter(s) (50 mL/Hr) IV Continuous <Continuous>  dextrose 50% Injectable 12.5 Gram(s) IV Push once  dextrose 50% Injectable 25 Gram(s) IV Push once  dextrose 50% Injectable 25 Gram(s) IV Push once  furosemide    Tablet 40 milliGRAM(s) Oral two times a day  heparin  Injectable 5000 Unit(s) SubCutaneous every 8 hours  insulin lispro (HumaLOG) corrective regimen sliding scale   SubCutaneous three times a day before meals  isosorbide   mononitrate ER Tablet (IMDUR) 60 milliGRAM(s) Oral at bedtime  isosorbide   mononitrate ER Tablet (IMDUR) 60 milliGRAM(s) Oral daily  LORazepam     Tablet 1 milliGRAM(s) Oral two times a day  metoprolol succinate  milliGRAM(s) Oral two times a day  metoprolol succinate ER 75 milliGRAM(s) Oral every 12 hours  metoprolol tartrate 25 milliGRAM(s) Oral once  montelukast 10 milliGRAM(s) Oral at bedtime  nitroglycerin    2% Ointment 2 Inch(s) Transdermal four times a day  sodium chloride 0.9%. 1000 milliLiter(s) (50 mL/Hr) IV Continuous <Continuous>  tiotropium 18 MICROgram(s) Capsule 1 Capsule(s) Inhalation daily    MEDICATIONS  (PRN):  acetaminophen   Tablet. 650 milliGRAM(s) Oral every 6 hours PRN Mild Pain (1 - 3)  dextrose 40% Gel 15 Gram(s) Oral once PRN Blood Glucose LESS THAN 70 milliGRAM(s)/deciliter  glucagon  Injectable 1 milliGRAM(s) IntraMuscular once PRN Glucose LESS THAN 70 milligrams/deciliter        Vital Signs Last 24 Hrs  T(C): 37.2 (2018 11:55), Max: 37.7 (2018 23:18)  T(F): 99 (2018 11:55), Max: 99.9 (2018 23:18)  HR: 96 (2018 10:00) (70 - 99)  BP: 169/99 (2018 10:00) (146/96 - 201/139)  BP(mean): 115 (2018 10:00) (98 - 153)  RR: 33 (2018 01:00) (11 - 35)  SpO2: 96% (2018 18:20) (92% - 97%)Daily     Daily Weight in k.3 (2018 06:24)I&O's Summary    2018 07:01  -  2018 07:00  --------------------------------------------------------  IN: 0 mL / OUT: 2400 mL / NET: -2400 mL        PHYSICAL EXAM:    Constitutional: NAD, awake and alert, well-developed  HEENT: PERR, EOMI,  No oral cyananosis.  Neck:  supple,  No JVD  Respiratory: Breath sounds are clear bilaterally, No wheezing, rales or rhonchi  Cardiovascular: S1 and S2, regular rate and rhythm, no Murmurs, gallops or rubs  Gastrointestinal: Bowel Sounds present, soft, nontender.   Extremities: No peripheral edema. No clubbing or cyanosis.  Vascular: 2+ peripheral pulses  Neurological: A/O x 3, no focal deficits  Musculoskeletal: no calf tenderness.  Skin: No rashes.      LABS: All Labs Reviewed:                        14.1   12. )-----------( 297      ( 2018 04:22 )             44.2                         14.0   16.38 )-----------( 306      ( 2018 10:36 )             42.8                         13.0   11.77 )-----------( 266      ( 2018 03:14 )             39.8     2018 04:22    138    |  100    |  29     ----------------------------<  94     3.9     |  31     |  1.87   2018 03:14    139    |  103    |  27     ----------------------------<  136    4.3     |  31     |  1.52   2018 08:58    137    |  104    |  23     ----------------------------<  123    3.8     |  26     |  1.63     Ca    8.6        2018 04:22  Ca    8.6        2018 03:14  Ca    8.2        2018 08:58  Phos  3.0       2018 04:22  Phos  4.0       2018 03:14    TPro  x      /  Alb  3.1    /  TBili  x      /  DBili  x      /  AST  x      /  ALT  x      /  AlkPhos  x      2018 04:22  TPro  x      /  Alb  3.0    /  TBili  x      /  DBili  x      /  AST  x      /  ALT  x      /  AlkPhos  x      2018 03:14  TPro  6.3    /  Alb  3.0    /  TBili  1.1    /  DBili  x      /  AST  43     /  ALT  68     /  AlkPhos  55     2018 08:58    PTT - ( 2018 10:35 )  PTT:30.3 sec  CARDIAC MARKERS ( 2018 10:36 )  0.066 ng/mL / x     / 164 U/L / x     / x      CARDIAC MARKERS ( 2018 03:14 )  0.067 ng/mL / x     / 153 U/L / x     / x      CARDIAC MARKERS ( 2018 19:54 )  0.079 ng/mL / x     / 177 U/L / x     / x      CARDIAC MARKERS ( 2018 17:15 )  0.084 ng/mL / x     / x     / x     / x          Blood Culture: Organism --  Gram Stain Blood -- Gram Stain --  Specimen Source .Blood None  Culture-Blood --       @ 08:58  Pro Bnp [id number]        RADIOLOGY/EKG:< from: 12 Lead ECG (18 @ 11:03) >  Diagnosis Line Normal sinus rhythm  Possible Left atrial enlargement  Left ventricular hypertrophy with repolarization abnormality Lv strain/ischemia    Prolonged QT R/o decreased calcium/decreased potassium or med. effects    < end of copied text >  < from: Transthoracic Echocardiogram (18 @ 11:53) >  Impression     Summary     The left atrium is severely dilated.   The left ventricle cavity is mild to moderately dilated. Left ventricle   systolic function appears severely impaired; segmental wall motion   abnormalities noted. Estimated Ejection Fraction is 25-30%.     Signature     ----------------------------------------------------------------   Electronically signed by Ramírez Aranda MD(Interpreting   physician) on 2018 03:28 PM   ----------------------------------------------------------------    < end of copied text >        ECHO/CARDIAC CATHTERIZATION/STRESS TEST:
Subjective:  still some stephenson  feeling better    MEDICATIONS  (STANDING):  ALBUTerol/ipratropium for Nebulization 3 milliLiter(s) Nebulizer every 6 hours  amLODIPine   Tablet 5 milliGRAM(s) Oral daily  amLODIPine   Tablet 5 milliGRAM(s) Oral <User Schedule>  aspirin enteric coated 81 milliGRAM(s) Oral daily  atorvastatin 10 milliGRAM(s) Oral at bedtime  buDESOnide 160 MICROgram(s)/formoterol 4.5 MICROgram(s) Inhaler 2 Puff(s) Inhalation two times a day  dextrose 5%. 1000 milliLiter(s) (50 mL/Hr) IV Continuous <Continuous>  dextrose 50% Injectable 12.5 Gram(s) IV Push once  dextrose 50% Injectable 25 Gram(s) IV Push once  dextrose 50% Injectable 25 Gram(s) IV Push once  furosemide    Tablet 40 milliGRAM(s) Oral daily  heparin  Injectable 5000 Unit(s) SubCutaneous every 8 hours  hydrALAZINE 75 milliGRAM(s) Oral every 8 hours  insulin lispro (HumaLOG) corrective regimen sliding scale   SubCutaneous three times a day before meals  isosorbide   mononitrate ER Tablet (IMDUR) 60 milliGRAM(s) Oral at bedtime  isosorbide   mononitrate ER Tablet (IMDUR) 60 milliGRAM(s) Oral daily  LORazepam     Tablet 1 milliGRAM(s) Oral two times a day  metoprolol succinate  milliGRAM(s) Oral two times a day  montelukast 10 milliGRAM(s) Oral at bedtime  nitroglycerin    2% Ointment 2 Inch(s) Transdermal four times a day  sodium chloride 0.9%. 1000 milliLiter(s) (50 mL/Hr) IV Continuous <Continuous>  tiotropium 18 MICROgram(s) Capsule 1 Capsule(s) Inhalation daily    MEDICATIONS  (PRN):  acetaminophen   Tablet. 650 milliGRAM(s) Oral every 6 hours PRN Mild Pain (1 - 3)  dextrose 40% Gel 15 Gram(s) Oral once PRN Blood Glucose LESS THAN 70 milliGRAM(s)/deciliter  glucagon  Injectable 1 milliGRAM(s) IntraMuscular once PRN Glucose LESS THAN 70 milligrams/deciliter      Allergies    No Known Allergies    Intolerances        REVIEW OF SYSTEMS:    CONSTITUTIONAL:  As per HPI.  HEENT:  Eyes:  No diplopia or blurred vision. ENT:  No earache, sore throat or runny nose.  CARDIOVASCULAR:  No pressure, squeezing, tightness, heaviness or aching about the chest, neck, axilla or epigastrium.  RESPIRATORY:  No cough, shortness of breath, PND or orthopnea.  GASTROINTESTINAL:  No nausea, vomiting or diarrhea.  GENITOURINARY:  No dysuria, frequency or urgency.  MUSCULOSKELETAL:  no joint pain, deformity, tenderness  EXTREMITIES: no clubbing cyanosis,edema  SKIN:  No change in skin, hair or nails.  NEUROLOGIC:  No paresthesias, fasciculations, seizures or weakness.  PSYCHIATRIC:  No disorder of thought or mood.  ENDOCRINE:  No heat or cold intolerance, polyuria or polydipsia.  HEMATOLOGICAL:  No easy bruising or bleedings:    Vital Signs Last 24 Hrs  T(C): 35.9 (15 Julian 2018 06:07), Max: 37.2 (14 Jun 2018 11:55)  T(F): 96.6 (15 Julian 2018 06:07), Max: 99 (14 Jun 2018 11:55)  HR: 74 (15 Julian 2018 07:00) (74 - 98)  BP: 153/86 (15 Julian 2018 07:00) (141/85 - 192/113)  BP(mean): 99 (15 Julian 2018 07:00) (86 - 130)  RR: --  SpO2: --    PHYSICAL EXAMINATION:  SKIN: no rashes  HEAD: NC/AT  EYES: PERRLA, EOMI  EARS: TM's intact  NOSE: no abnormalities  NECK:  Supple. No lymphadenopathy. Jugular venous pressure not elevated. Carotids equal.   HEART:   The cardiac impulse has a normal quality. Reg., Nl S1 and S2.  There are no murmurs, rubs or gallops noted  CHEST:  bilateral ronchi  ABDOMEN:  Soft and nontender.   EXTREMITIES:  no C/C/E  NEURO: AAO x 3, no focal deficts       LABS:                        13.7   8.48  )-----------( 242      ( 15 Julian 2018 04:52 )             43.4     06-15    138  |  101  |  29<H>  ----------------------------<  97  3.6   |  31  |  1.82<H>    Ca    8.2<L>      15 Julian 2018 04:52  Phos  3.0     06-14    TPro  x   /  Alb  3.1<L>  /  TBili  x   /  DBili  x   /  AST  x   /  ALT  x   /  AlkPhos  x   06-14    PTT - ( 13 Jun 2018 10:35 )  PTT:30.3 sec      RADIOLOGY & ADDITIONAL TESTS:

## 2018-06-15 NOTE — PROGRESS NOTE ADULT - PROBLEM SELECTOR PROBLEM 4
Hypertension, uncontrolled
NSTEMI (non-ST elevated myocardial infarction)

## 2018-06-15 NOTE — PROGRESS NOTE ADULT - PROBLEM SELECTOR PROBLEM 5
Hypertension, renal disease, stage 1-4 or unspecified chronic kidney disease
Hypertension, unspecified type

## 2018-06-15 NOTE — PROGRESS NOTE ADULT - PROBLEM SELECTOR PLAN 3
improved on therapy already.  Continue to monitor his response especially with increase in BB.
improved on therapy already.  Continue to monitor his response especially with addition of BB.
improved on therapy already.  Continue to monitor his response especially with increase in BB.

## 2018-06-15 NOTE — DISCHARGE NOTE ADULT - PATIENT PORTAL LINK FT
You can access the [facility] Portal, offered by [facility], by registering with the following website: [email or web address]

## 2018-06-15 NOTE — PROGRESS NOTE ADULT - PROBLEM SELECTOR PLAN 4
Non-obstructive CAD.  Demand ischemia.

## 2018-06-15 NOTE — DISCHARGE NOTE ADULT - CARE PROVIDERS DIRECT ADDRESSES
[email or web address],DirectAddress_Unknown

## 2018-06-15 NOTE — DISCHARGE NOTE ADULT - PROVIDER TOKENS
TOKEN:'[id number]',TOKEN:'[id number]' TOKEN:'[id number]',TOKEN:'[id number]',TOKEN:'[id number]'

## 2018-06-15 NOTE — PROGRESS NOTE ADULT - PROBLEM SELECTOR PLAN 2
Improved. Pulmonary appreciated
Improved. Pulmonary appreciated
Probably has this to some degree but may have been a mask for cardiomyopathy.

## 2018-06-15 NOTE — PROGRESS NOTE ADULT - PROBLEM SELECTOR PROBLEM 3
Cardiomyopathy due to hypertension, with heart failure
SOB (shortness of breath)

## 2018-06-15 NOTE — PROGRESS NOTE ADULT - ASSESSMENT
- d/c albuterol mdi  - restart symbicort 160/4.5 mcg bid  - cont spiriva  - recent worsened dyspnea most likely due to cardiomyopathy  - creat slightly improved w hydration  - BP improved.  - will f/u as outpatient  - dvt proph

## 2018-06-15 NOTE — PROGRESS NOTE ADULT - PROBLEM SELECTOR PLAN 5
Medicines adjusted to control better.

## 2018-06-15 NOTE — PROGRESS NOTE ADULT - PROBLEM SELECTOR PROBLEM 1
Cardiomyopathy due to hypertension, with heart failure
Asthma

## 2018-06-15 NOTE — DISCHARGE NOTE ADULT - CARE PROVIDER_API CALL
Red Clifton), Internal Medicine; Pulmonary Disease  [address] [place] [address]  Phone: [phone]  Fax: [phone]    Isaiah Teresa), Cardiovascular Disease  [address] [place] [address]  Phone: [phone]  Fax: [phone] Red Clifton), Internal Medicine; Pulmonary Disease  [address] [place] [address]  Phone: [phone]  Fax: [phone]    Isaiah Teresa), Cardiovascular Disease  [address] [place] [address]  Phone: [phone]  Fax: [phone]    Yun, SukHyeon (MD), Nephrology  [address] [place] [address]  Phone: [phone]  Fax: [phone]

## 2018-06-15 NOTE — DISCHARGE NOTE ADULT - MEDICATION SUMMARY - MEDICATIONS TO TAKE
I will START or STAY ON the medications listed below when I get home from the hospital:    aspirin 81 mg oral delayed release tablet  -- 1 tab(s) by mouth once a day  -- Indication: For Cad    isosorbide mononitrate 60 mg oral tablet, extended release  -- 1 tab(s) by mouth 2 times a day   take one pill in am and 1 pill at bedtime  -- Indication: For Htn    LORazepam 1 mg oral tablet  -- 1 tab(s) by mouth 2 times a day, As Needed MDD:2  -- Indication: For Anxiety    glimepiride 1 mg oral tablet  -- 1 tab(s) by mouth once a day   -- Avoid prolonged or excessive exposure to direct and/or artificial sunlight while taking this medication.  Do not drink alcoholic beverages when taking this medication.  It is very important that you take or use this exactly as directed.  Do not skip doses or discontinue unless directed by your doctor.    -- Indication: For dm    atorvastatin 10 mg oral tablet  -- 1 tab(s) by mouth once a day (at bedtime)  -- Indication: For Hyperlipidemia    metoprolol succinate 100 mg oral tablet, extended release  -- 1 tab(s) by mouth 2 times a day  -- Indication: For Htn    budesonide-formoterol 160 mcg-4.5 mcg/inh inhalation aerosol  -- 1 unit(s) inhaled 2 times a day   -- Indication: For Asthma    tiotropium 18 mcg inhalation capsule  -- 1 cap(s) inhaled once a day  -- Indication: For Asthma    Norvasc 5 mg oral tablet  -- 1 tab(s) by mouth 2 times a day   -- Indication: For Htn    furosemide 40 mg oral tablet  -- 1 tab(s) by mouth once a day  -- Indication: For Chf    montelukast 10 mg oral tablet  -- 1 tab(s) by mouth once a day (at bedtime)  -- Indication: For Asthma    hydrALAZINE 25 mg oral tablet  -- 3 tab(s) by mouth every 8 hours  -- Indication: For Htn

## 2018-06-16 PROBLEM — J45.909 UNSPECIFIED ASTHMA, UNCOMPLICATED: Chronic | Status: ACTIVE | Noted: 2018-06-12

## 2018-06-17 LAB
CULTURE RESULTS: SIGNIFICANT CHANGE UP
SPECIMEN SOURCE: SIGNIFICANT CHANGE UP

## 2018-06-19 DIAGNOSIS — Z79.51 LONG TERM (CURRENT) USE OF INHALED STEROIDS: ICD-10-CM

## 2018-06-19 DIAGNOSIS — I20.0 UNSTABLE ANGINA: ICD-10-CM

## 2018-06-19 DIAGNOSIS — I50.21 ACUTE SYSTOLIC (CONGESTIVE) HEART FAILURE: ICD-10-CM

## 2018-06-19 DIAGNOSIS — N18.3 CHRONIC KIDNEY DISEASE, STAGE 3 (MODERATE): ICD-10-CM

## 2018-06-19 DIAGNOSIS — R73.03 PREDIABETES: ICD-10-CM

## 2018-06-19 DIAGNOSIS — I13.0 HYPERTENSIVE HEART AND CHRONIC KIDNEY DISEASE WITH HEART FAILURE AND STAGE 1 THROUGH STAGE 4 CHRONIC KIDNEY DISEASE, OR UNSPECIFIED CHRONIC KIDNEY DISEASE: ICD-10-CM

## 2018-06-19 DIAGNOSIS — I42.9 CARDIOMYOPATHY, UNSPECIFIED: ICD-10-CM

## 2018-06-19 DIAGNOSIS — I16.0 HYPERTENSIVE URGENCY: ICD-10-CM

## 2018-06-19 DIAGNOSIS — Z87.891 PERSONAL HISTORY OF NICOTINE DEPENDENCE: ICD-10-CM

## 2018-06-19 DIAGNOSIS — J45.901 UNSPECIFIED ASTHMA WITH (ACUTE) EXACERBATION: ICD-10-CM

## 2018-06-19 DIAGNOSIS — Z79.52 LONG TERM (CURRENT) USE OF SYSTEMIC STEROIDS: ICD-10-CM

## 2018-06-19 DIAGNOSIS — E66.01 MORBID (SEVERE) OBESITY DUE TO EXCESS CALORIES: ICD-10-CM

## 2018-06-19 DIAGNOSIS — J96.00 ACUTE RESPIRATORY FAILURE, UNSPECIFIED WHETHER WITH HYPOXIA OR HYPERCAPNIA: ICD-10-CM

## 2018-06-19 DIAGNOSIS — N17.9 ACUTE KIDNEY FAILURE, UNSPECIFIED: ICD-10-CM

## 2018-06-19 DIAGNOSIS — I21.4 NON-ST ELEVATION (NSTEMI) MYOCARDIAL INFARCTION: ICD-10-CM

## 2018-06-21 ENCOUNTER — APPOINTMENT (OUTPATIENT)
Dept: INTERNAL MEDICINE | Facility: CLINIC | Age: 53
End: 2018-06-21
Payer: COMMERCIAL

## 2018-06-21 VITALS
WEIGHT: 278 LBS | OXYGEN SATURATION: 95 % | TEMPERATURE: 97.6 F | SYSTOLIC BLOOD PRESSURE: 156 MMHG | HEART RATE: 74 BPM | HEIGHT: 73 IN | DIASTOLIC BLOOD PRESSURE: 100 MMHG | RESPIRATION RATE: 22 BRPM | BODY MASS INDEX: 36.84 KG/M2

## 2018-06-21 PROCEDURE — 94060 EVALUATION OF WHEEZING: CPT

## 2018-06-21 PROCEDURE — 94729 DIFFUSING CAPACITY: CPT

## 2018-06-21 PROCEDURE — ZZZZZ: CPT

## 2018-06-21 PROCEDURE — 99215 OFFICE O/P EST HI 40 MIN: CPT | Mod: 25

## 2018-06-21 PROCEDURE — 94727 GAS DIL/WSHOT DETER LNG VOL: CPT

## 2018-06-21 RX ORDER — BUDESONIDE 0.5 MG/2ML
0.5 INHALANT ORAL
Qty: 60 | Refills: 0 | Status: DISCONTINUED | COMMUNITY
Start: 2018-01-27 | End: 2018-06-21

## 2018-06-21 RX ORDER — BUDESONIDE AND FORMOTEROL FUMARATE DIHYDRATE 160; 4.5 UG/1; UG/1
160-4.5 AEROSOL RESPIRATORY (INHALATION)
Qty: 10 | Refills: 0 | Status: DISCONTINUED | COMMUNITY
Start: 2018-06-15 | End: 2018-06-21

## 2018-06-21 RX ORDER — PREDNISONE 10 MG/1
10 TABLET ORAL
Qty: 12 | Refills: 0 | Status: DISCONTINUED | COMMUNITY
Start: 2018-01-13 | End: 2018-06-21

## 2018-06-21 RX ORDER — OSELTAMIVIR PHOSPHATE 75 MG/1
75 CAPSULE ORAL
Qty: 10 | Refills: 0 | Status: DISCONTINUED | COMMUNITY
Start: 2018-01-27 | End: 2018-06-21

## 2018-06-21 RX ORDER — ALBUTEROL SULFATE 2.5 MG/3ML
(2.5 MG/3ML) SOLUTION RESPIRATORY (INHALATION)
Qty: 150 | Refills: 0 | Status: DISCONTINUED | COMMUNITY
Start: 2018-06-06 | End: 2018-06-21

## 2018-06-21 RX ORDER — LEVOFLOXACIN 500 MG/1
500 TABLET, FILM COATED ORAL
Qty: 14 | Refills: 0 | Status: DISCONTINUED | COMMUNITY
Start: 2018-01-13 | End: 2018-06-21

## 2018-06-21 NOTE — DATA REVIEWED
[FreeTextEntry1] : Complete pulmonary function tests show mild obstructive lung disease. FEV1 is 2.81 L which is 66% predicted. FEV1/FVC ratio 73%. Total lung capacity is 6.07 L which is 81% predicted. Diffusing capacity is 72%.

## 2018-06-21 NOTE — ASSESSMENT
[FreeTextEntry1] : Mr. Hairston is a 51-year-old male who presents for followup evaluation of [facility]. He was admitted with increased shortness of breath. Patient had Cardiomyopathy with ejection fraction of 30% on cardiac catheterization. He had nonobstructive coronary disease. A CT scan of chest showed bilateral hilar adenopathy. Patient was also found to have an elevated creatinine most likely due to hypertensive nephropathy.\par \par #1. Patient will be referred to Dr. Tony Ruiz for cardiac evaluation. He will most likely have a repeat echocardiogram.\par \par #2. Patient given a prescription comprehensive blood profile to check serum creatinine as well as blood sugar. If creatinine remains slightly elevated then patient may require nephrology referral.\par \par #3. Patient will increase Norvasc to 10 mg p.o. b.i.d. for treatment of his hypertension.\par \par #4. Patient will followup in 4 weeks' time for reevaluation.

## 2018-06-22 ENCOUNTER — NON-APPOINTMENT (OUTPATIENT)
Age: 53
End: 2018-06-22

## 2018-06-22 ENCOUNTER — APPOINTMENT (OUTPATIENT)
Dept: CARDIOLOGY | Facility: CLINIC | Age: 53
End: 2018-06-22
Payer: COMMERCIAL

## 2018-06-22 VITALS — HEART RATE: 65 BPM | OXYGEN SATURATION: 93 % | DIASTOLIC BLOOD PRESSURE: 105 MMHG | SYSTOLIC BLOOD PRESSURE: 152 MMHG

## 2018-06-22 VITALS — HEIGHT: 73 IN | WEIGHT: 282 LBS | BODY MASS INDEX: 37.37 KG/M2

## 2018-06-22 DIAGNOSIS — Z80.9 FAMILY HISTORY OF MALIGNANT NEOPLASM, UNSPECIFIED: ICD-10-CM

## 2018-06-22 DIAGNOSIS — Z80.8 FAMILY HISTORY OF MALIGNANT NEOPLASM OF OTHER ORGANS OR SYSTEMS: ICD-10-CM

## 2018-06-22 DIAGNOSIS — Z78.9 OTHER SPECIFIED HEALTH STATUS: ICD-10-CM

## 2018-06-22 DIAGNOSIS — Z80.1 FAMILY HISTORY OF MALIGNANT NEOPLASM OF TRACHEA, BRONCHUS AND LUNG: ICD-10-CM

## 2018-06-22 DIAGNOSIS — Z87.09 PERSONAL HISTORY OF OTHER DISEASES OF THE RESPIRATORY SYSTEM: ICD-10-CM

## 2018-06-22 DIAGNOSIS — Z87.2 PERSONAL HISTORY OF DISEASES OF THE SKIN AND SUBCUTANEOUS TISSUE: ICD-10-CM

## 2018-06-22 DIAGNOSIS — Z82.49 FAMILY HISTORY OF ISCHEMIC HEART DISEASE AND OTHER DISEASES OF THE CIRCULATORY SYSTEM: ICD-10-CM

## 2018-06-22 DIAGNOSIS — Z80.7 FAMILY HISTORY OF OTHER MALIGNANT NEOPLASMS OF LYMPHOID, HEMATOPOIETIC AND RELATED TISSUES: ICD-10-CM

## 2018-06-22 PROCEDURE — 93000 ELECTROCARDIOGRAM COMPLETE: CPT

## 2018-06-22 PROCEDURE — 99215 OFFICE O/P EST HI 40 MIN: CPT | Mod: 25

## 2018-06-22 RX ORDER — HYDRALAZINE HYDROCHLORIDE 100 MG/1
100 TABLET ORAL 3 TIMES DAILY
Qty: 270 | Refills: 3 | Status: DISCONTINUED | COMMUNITY
Start: 2018-06-22 | End: 2018-06-22

## 2018-06-22 RX ORDER — HYDRALAZINE HYDROCHLORIDE 10 MG/1
10 TABLET ORAL
Qty: 270 | Refills: 1 | Status: DISCONTINUED | COMMUNITY
Start: 2018-06-15 | End: 2018-06-22

## 2018-06-25 ENCOUNTER — APPOINTMENT (OUTPATIENT)
Dept: INTERNAL MEDICINE | Facility: CLINIC | Age: 53
End: 2018-06-25

## 2018-06-26 ENCOUNTER — TRANSCRIPTION ENCOUNTER (OUTPATIENT)
Age: 53
End: 2018-06-26

## 2018-06-26 ENCOUNTER — RX RENEWAL (OUTPATIENT)
Age: 53
End: 2018-06-26

## 2018-06-26 RX ORDER — MOMETASONE FUROATE AND FORMOTEROL FUMARATE DIHYDRATE 200; 5 UG/1; UG/1
200-5 AEROSOL RESPIRATORY (INHALATION)
Qty: 1 | Refills: 5 | Status: DISCONTINUED | COMMUNITY
Start: 2018-06-21 | End: 2018-06-26

## 2018-07-05 ENCOUNTER — APPOINTMENT (OUTPATIENT)
Dept: INTERNAL MEDICINE | Facility: CLINIC | Age: 53
End: 2018-07-05

## 2018-07-08 PROBLEM — Z87.09 HISTORY OF ASTHMA: Status: RESOLVED | Noted: 2018-07-08 | Resolved: 2018-07-08

## 2018-07-08 PROBLEM — Z80.7 FAMILY HISTORY OF MULTIPLE MYELOMA: Status: ACTIVE | Noted: 2018-06-21

## 2018-07-08 PROBLEM — Z80.8 FAMILY HISTORY OF MALIGNANT MELANOMA: Status: ACTIVE | Noted: 2018-07-08

## 2018-07-08 PROBLEM — Z80.1 FAMILY HISTORY OF LUNG CANCER: Status: ACTIVE | Noted: 2018-06-21

## 2018-07-08 PROBLEM — Z78.9 SOCIAL ALCOHOL USE: Status: ACTIVE | Noted: 2018-06-21

## 2018-07-08 PROBLEM — Z82.49 FAMILY HISTORY OF MYOCARDIAL INFARCTION: Status: ACTIVE | Noted: 2018-06-21

## 2018-07-08 PROBLEM — Z80.9 FAMILY HISTORY OF MALIGNANT NEOPLASM: Status: ACTIVE | Noted: 2018-06-22

## 2018-07-08 PROBLEM — Z87.2 HISTORY OF PSORIASIS: Status: RESOLVED | Noted: 2018-07-08 | Resolved: 2018-07-08

## 2018-07-17 ENCOUNTER — MEDICATION RENEWAL (OUTPATIENT)
Age: 53
End: 2018-07-17

## 2018-07-23 ENCOUNTER — APPOINTMENT (OUTPATIENT)
Dept: INTERNAL MEDICINE | Facility: CLINIC | Age: 53
End: 2018-07-23
Payer: COMMERCIAL

## 2018-07-23 VITALS
WEIGHT: 263 LBS | BODY MASS INDEX: 34.85 KG/M2 | OXYGEN SATURATION: 97 % | DIASTOLIC BLOOD PRESSURE: 90 MMHG | HEIGHT: 73 IN | HEART RATE: 68 BPM | RESPIRATION RATE: 18 BRPM | TEMPERATURE: 97.6 F | SYSTOLIC BLOOD PRESSURE: 152 MMHG

## 2018-07-23 PROCEDURE — 99214 OFFICE O/P EST MOD 30 MIN: CPT

## 2018-07-23 RX ORDER — FLUTICASONE FUROATE AND VILANTEROL TRIFENATATE 200; 25 UG/1; UG/1
200-25 POWDER RESPIRATORY (INHALATION)
Qty: 1 | Refills: 5 | Status: DISCONTINUED | COMMUNITY
Start: 2018-06-26 | End: 2018-07-23

## 2018-07-23 RX ORDER — TIOTROPIUM BROMIDE 18 UG/1
18 CAPSULE ORAL; RESPIRATORY (INHALATION)
Qty: 30 | Refills: 0 | Status: DISCONTINUED | COMMUNITY
Start: 2018-06-15 | End: 2018-07-23

## 2018-07-23 NOTE — PLAN
[FreeTextEntry1] : Mr. Hairston will continue with her medication regimen. We will repeat complete metabolic profile which will include creatinine and hemoglobin A1c level. If creatinine remains elevated he will be referred for nephrology evaluation. Most recent creatinine was 1.78. It was 1.81 on discharge from the hospital. Followup in 3 months.

## 2018-07-25 ENCOUNTER — APPOINTMENT (OUTPATIENT)
Dept: INTERNAL MEDICINE | Facility: CLINIC | Age: 53
End: 2018-07-25

## 2018-07-26 LAB — HBA1C MFR BLD HPLC: 6.2 %

## 2018-07-27 LAB
ALBUMIN SERPL ELPH-MCNC: 4 G/DL
ALP BLD-CCNC: 59 U/L
ALT SERPL-CCNC: 13 U/L
ANION GAP SERPL CALC-SCNC: 17 MMOL/L
AST SERPL-CCNC: 18 U/L
BILIRUB SERPL-MCNC: 0.5 MG/DL
BUN SERPL-MCNC: 20 MG/DL
CALCIUM SERPL-MCNC: 9.7 MG/DL
CHLORIDE SERPL-SCNC: 101 MMOL/L
CO2 SERPL-SCNC: 25 MMOL/L
CREAT SERPL-MCNC: 1.38 MG/DL
GLUCOSE SERPL-MCNC: 68 MG/DL
POTASSIUM SERPL-SCNC: 3.8 MMOL/L
PROT SERPL-MCNC: 7.4 G/DL
SODIUM SERPL-SCNC: 143 MMOL/L

## 2018-07-30 ENCOUNTER — RX RENEWAL (OUTPATIENT)
Age: 53
End: 2018-07-30

## 2018-07-31 ENCOUNTER — NON-APPOINTMENT (OUTPATIENT)
Age: 53
End: 2018-07-31

## 2018-07-31 ENCOUNTER — APPOINTMENT (OUTPATIENT)
Dept: CARDIOLOGY | Facility: CLINIC | Age: 53
End: 2018-07-31
Payer: COMMERCIAL

## 2018-07-31 VITALS
HEIGHT: 73 IN | SYSTOLIC BLOOD PRESSURE: 157 MMHG | WEIGHT: 262 LBS | OXYGEN SATURATION: 96 % | HEART RATE: 68 BPM | BODY MASS INDEX: 34.72 KG/M2 | RESPIRATION RATE: 16 BRPM | DIASTOLIC BLOOD PRESSURE: 92 MMHG

## 2018-07-31 PROCEDURE — 99215 OFFICE O/P EST HI 40 MIN: CPT | Mod: 25

## 2018-07-31 PROCEDURE — 93000 ELECTROCARDIOGRAM COMPLETE: CPT

## 2018-10-09 ENCOUNTER — NON-APPOINTMENT (OUTPATIENT)
Age: 53
End: 2018-10-09

## 2018-10-09 ENCOUNTER — APPOINTMENT (OUTPATIENT)
Dept: CARDIOLOGY | Facility: CLINIC | Age: 53
End: 2018-10-09
Payer: COMMERCIAL

## 2018-10-09 ENCOUNTER — LABORATORY RESULT (OUTPATIENT)
Age: 53
End: 2018-10-09

## 2018-10-09 VITALS
DIASTOLIC BLOOD PRESSURE: 88 MMHG | SYSTOLIC BLOOD PRESSURE: 153 MMHG | BODY MASS INDEX: 33.66 KG/M2 | WEIGHT: 254 LBS | HEART RATE: 61 BPM | RESPIRATION RATE: 16 BRPM | TEMPERATURE: 97.6 F | HEIGHT: 73 IN | OXYGEN SATURATION: 96 %

## 2018-10-09 DIAGNOSIS — E66.9 OBESITY, UNSPECIFIED: ICD-10-CM

## 2018-10-09 DIAGNOSIS — Z87.09 PERSONAL HISTORY OF OTHER DISEASES OF THE RESPIRATORY SYSTEM: ICD-10-CM

## 2018-10-09 DIAGNOSIS — R59.0 LOCALIZED ENLARGED LYMPH NODES: ICD-10-CM

## 2018-10-09 PROCEDURE — 93000 ELECTROCARDIOGRAM COMPLETE: CPT

## 2018-10-09 PROCEDURE — 99215 OFFICE O/P EST HI 40 MIN: CPT | Mod: 25

## 2018-10-09 RX ORDER — BUDESONIDE AND FORMOTEROL FUMARATE DIHYDRATE 160; 4.5 UG/1; UG/1
160-4.5 AEROSOL RESPIRATORY (INHALATION) TWICE DAILY
Qty: 1 | Refills: 5 | Status: DISCONTINUED | COMMUNITY
Start: 2018-07-23 | End: 2018-10-09

## 2018-10-12 LAB
25(OH)D3 SERPL-MCNC: 49.4 NG/ML
ALBUMIN SERPL ELPH-MCNC: 4.8 G/DL
ALP BLD-CCNC: 71 U/L
ALT SERPL-CCNC: 16 U/L
ANION GAP SERPL CALC-SCNC: 15 MMOL/L
AST SERPL-CCNC: 17 U/L
BASOPHILS # BLD AUTO: 0.08 K/UL
BASOPHILS NFR BLD AUTO: 0.9 %
BILIRUB SERPL-MCNC: 0.3 MG/DL
BUN SERPL-MCNC: 21 MG/DL
CALCIUM SERPL-MCNC: 9.8 MG/DL
CHLORIDE SERPL-SCNC: 105 MMOL/L
CHOLEST SERPL-MCNC: 103 MG/DL
CHOLEST/HDLC SERPL: 3.6 RATIO
CK SERPL-CCNC: 84 U/L
CO2 SERPL-SCNC: 25 MMOL/L
CREAT SERPL-MCNC: 1.47 MG/DL
EOSINOPHIL # BLD AUTO: 0.28 K/UL
EOSINOPHIL NFR BLD AUTO: 3.1 %
GLUCOSE SERPL-MCNC: 92 MG/DL
HBA1C MFR BLD HPLC: 5.5 %
HCT VFR BLD CALC: 42.2 %
HDLC SERPL-MCNC: 29 MG/DL
HGB BLD-MCNC: 13.3 G/DL
IMM GRANULOCYTES NFR BLD AUTO: 0.6 %
LDLC SERPL CALC-MCNC: 45 MG/DL
LDLC SERPL DIRECT ASSAY-MCNC: 54 MG/DL
LYMPHOCYTES # BLD AUTO: 2.5 K/UL
LYMPHOCYTES NFR BLD AUTO: 28.1 %
MAN DIFF?: NORMAL
MCHC RBC-ENTMCNC: 30.4 PG
MCHC RBC-ENTMCNC: 31.5 GM/DL
MCV RBC AUTO: 96.3 FL
MONOCYTES # BLD AUTO: 0.83 K/UL
MONOCYTES NFR BLD AUTO: 9.3 %
NEUTROPHILS # BLD AUTO: 5.15 K/UL
NEUTROPHILS NFR BLD AUTO: 58 %
PLATELET # BLD AUTO: 310 K/UL
POTASSIUM SERPL-SCNC: 4.6 MMOL/L
PROT SERPL-MCNC: 7.2 G/DL
RBC # BLD: 4.38 M/UL
RBC # FLD: 15.8 %
SODIUM SERPL-SCNC: 145 MMOL/L
TRIGL SERPL-MCNC: 146 MG/DL
WBC # FLD AUTO: 8.89 K/UL

## 2018-10-29 ENCOUNTER — APPOINTMENT (OUTPATIENT)
Dept: INTERNAL MEDICINE | Facility: CLINIC | Age: 53
End: 2018-10-29
Payer: COMMERCIAL

## 2018-10-29 VITALS
WEIGHT: 235 LBS | BODY MASS INDEX: 31.14 KG/M2 | HEART RATE: 60 BPM | OXYGEN SATURATION: 96 % | HEIGHT: 73 IN | RESPIRATION RATE: 18 BRPM | DIASTOLIC BLOOD PRESSURE: 88 MMHG | TEMPERATURE: 97.8 F | SYSTOLIC BLOOD PRESSURE: 132 MMHG

## 2018-10-29 PROCEDURE — 99214 OFFICE O/P EST MOD 30 MIN: CPT

## 2018-10-29 RX ORDER — ALBUTEROL SULFATE 90 UG/1
108 (90 BASE) AEROSOL, METERED RESPIRATORY (INHALATION) EVERY 6 HOURS
Qty: 3 | Refills: 0 | Status: DISCONTINUED | COMMUNITY
Start: 2017-11-08 | End: 2018-10-29

## 2018-10-29 NOTE — HISTORY OF PRESENT ILLNESS
[FreeTextEntry1] : Followup evaluation [de-identified] : Mr. Hairston presents for followup evaluation accompanied by his wife. He is feeling well. He has lost possibly 50 pounds in the past 4-5 months. He has been compliant with his diet. Patient continues cardiology followup with Dr. Ruiz.

## 2018-11-08 ENCOUNTER — RX RENEWAL (OUTPATIENT)
Age: 53
End: 2018-11-08

## 2018-11-09 ENCOUNTER — MEDICATION RENEWAL (OUTPATIENT)
Age: 53
End: 2018-11-09

## 2018-11-15 LAB
ANION GAP SERPL CALC-SCNC: 14 MMOL/L
BUN SERPL-MCNC: 27 MG/DL
CALCIUM SERPL-MCNC: 9.6 MG/DL
CHLORIDE SERPL-SCNC: 103 MMOL/L
CO2 SERPL-SCNC: 25 MMOL/L
CREAT SERPL-MCNC: 1.43 MG/DL
GLUCOSE SERPL-MCNC: 106 MG/DL
POTASSIUM SERPL-SCNC: 4.3 MMOL/L
SODIUM SERPL-SCNC: 142 MMOL/L

## 2018-11-29 ENCOUNTER — APPOINTMENT (OUTPATIENT)
Dept: CARDIOLOGY | Facility: CLINIC | Age: 53
End: 2018-11-29
Payer: COMMERCIAL

## 2018-11-29 PROCEDURE — 93306 TTE W/DOPPLER COMPLETE: CPT

## 2018-12-18 ENCOUNTER — APPOINTMENT (OUTPATIENT)
Dept: CARDIOLOGY | Facility: CLINIC | Age: 53
End: 2018-12-18
Payer: COMMERCIAL

## 2018-12-18 VITALS
DIASTOLIC BLOOD PRESSURE: 70 MMHG | OXYGEN SATURATION: 96 % | RESPIRATION RATE: 16 BRPM | WEIGHT: 248 LBS | HEIGHT: 73 IN | TEMPERATURE: 97.6 F | SYSTOLIC BLOOD PRESSURE: 130 MMHG | HEART RATE: 54 BPM | BODY MASS INDEX: 32.87 KG/M2

## 2018-12-18 DIAGNOSIS — Z87.09 PERSONAL HISTORY OF OTHER DISEASES OF THE RESPIRATORY SYSTEM: ICD-10-CM

## 2018-12-18 DIAGNOSIS — R79.89 OTHER SPECIFIED ABNORMAL FINDINGS OF BLOOD CHEMISTRY: ICD-10-CM

## 2018-12-18 PROCEDURE — 99215 OFFICE O/P EST HI 40 MIN: CPT | Mod: 25

## 2018-12-18 PROCEDURE — 93000 ELECTROCARDIOGRAM COMPLETE: CPT

## 2018-12-18 RX ORDER — ISOSORBIDE MONONITRATE 60 MG/1
60 TABLET, EXTENDED RELEASE ORAL
Qty: 180 | Refills: 3 | Status: DISCONTINUED | COMMUNITY
Start: 2018-06-15 | End: 2018-12-18

## 2018-12-24 ENCOUNTER — NON-APPOINTMENT (OUTPATIENT)
Age: 53
End: 2018-12-24

## 2018-12-24 PROBLEM — R79.89 ELEVATED SERUM CREATININE: Status: RESOLVED | Noted: 2018-10-29 | Resolved: 2018-12-24

## 2018-12-24 NOTE — PHYSICAL EXAM
[General Appearance - Well Developed] : well developed [Normal Appearance] : normal appearance [General Appearance - Well Nourished] : well nourished [Normal Conjunctiva] : the conjunctiva exhibited no abnormalities [Eyelids - No Xanthelasma] : the eyelids demonstrated no xanthelasmas [No Oral Pallor] : no oral pallor [Normal Oropharynx] : normal oropharynx [Normal Jugular Venous A Waves Present] : normal jugular venous A waves present [Normal Jugular Venous V Waves Present] : normal jugular venous V waves present [Respiration, Rhythm And Depth] : normal respiratory rhythm and effort [Auscultation Breath Sounds / Voice Sounds] : lungs were clear to auscultation bilaterally [Bowel Sounds] : normal bowel sounds [Abdomen Soft] : soft [Abnormal Walk] : normal gait [Gait - Sufficient For Exercise Testing] : the gait was sufficient for exercise testing [Nail Clubbing] : no clubbing of the fingernails [Cyanosis, Localized] : no localized cyanosis [Petechial Hemorrhages (___cm)] : no petechial hemorrhages [] : no rash [Oriented To Time, Place, And Person] : oriented to person, place, and time [Affect] : the affect was normal [Ant Axillary Line] : palpated in the anterior axillary line [Diffuse] : diffuse [No Precordial Heave] : no precordial heave was noted [Normal Rate] : normal [Rhythm Regular] : regular [Normal S1] : normal S1 [Normal S2] : normal S2 [I] : a grade 1 [2+] : left 2+ [S3] : no S3 [Right Carotid Bruit] : no bruit heard over the right carotid [Left Carotid Bruit] : no bruit heard over the left carotid [No Pitting Edema] : no pitting edema present

## 2018-12-24 NOTE — DISCUSSION/SUMMARY
[FreeTextEntry1] : HTN and Nonischemic cardiomyopathy: LV FX NL on echo (from 30% on presentation). Change Losartan to 50 mg QD and decrease lasix to 40 mg QD. \par CAD: continue current therapy. Low Fat diet and Low sodium diet discussed.\par Hyperlipidemia: Excellent on recent blood work with LDL of 51.  Low fat diet, exercise and weight loss discussed.\par Diabetes: Reasonable control on diet and recent weight loss. \par Follow up in 2 months.

## 2018-12-24 NOTE — HISTORY OF PRESENT ILLNESS
[FreeTextEntry1] : Feels well. weight stable though did gain some back (may be from different scales as reports at home it is the same without changes. Denies chest pain, shortness of breath, dyspnea on exertion, palpitations, orthopnea, paroxysmal nocturnal dyspnea, claudication, dizziness,  lightheadedness, presyncopal, or syncopal symptoms. Reviewed echo results which showed return of heart function to NL. \par

## 2019-02-04 ENCOUNTER — INPATIENT (INPATIENT)
Facility: HOSPITAL | Age: 54
LOS: 1 days | Discharge: HOME | End: 2019-02-06
Attending: INTERNAL MEDICINE | Admitting: FAMILY MEDICINE
Payer: COMMERCIAL

## 2019-02-04 VITALS — HEIGHT: 73 IN | WEIGHT: 238.98 LBS

## 2019-02-04 LAB
ABO RH CONFIRMATION: SIGNIFICANT CHANGE UP
ALBUMIN SERPL ELPH-MCNC: 4.4 G/DL — SIGNIFICANT CHANGE UP (ref 3.3–5)
ALP SERPL-CCNC: 87 U/L — SIGNIFICANT CHANGE UP (ref 40–120)
ALT FLD-CCNC: 39 U/L — SIGNIFICANT CHANGE UP (ref 12–78)
ANION GAP SERPL CALC-SCNC: 8 MMOL/L — SIGNIFICANT CHANGE UP (ref 5–17)
APPEARANCE UR: CLEAR — SIGNIFICANT CHANGE UP
APTT BLD: 31 SEC — SIGNIFICANT CHANGE UP (ref 27.5–36.3)
AST SERPL-CCNC: 23 U/L — SIGNIFICANT CHANGE UP (ref 15–37)
BASOPHILS # BLD AUTO: 0.08 K/UL — SIGNIFICANT CHANGE UP (ref 0–0.2)
BASOPHILS NFR BLD AUTO: 0.8 % — SIGNIFICANT CHANGE UP (ref 0–2)
BILIRUB SERPL-MCNC: 0.4 MG/DL — SIGNIFICANT CHANGE UP (ref 0.2–1.2)
BILIRUB UR-MCNC: NEGATIVE — SIGNIFICANT CHANGE UP
BLD GP AB SCN SERPL QL: SIGNIFICANT CHANGE UP
BUN SERPL-MCNC: 25 MG/DL — HIGH (ref 7–23)
CALCIUM SERPL-MCNC: 9.4 MG/DL — SIGNIFICANT CHANGE UP (ref 8.5–10.1)
CHLORIDE SERPL-SCNC: 105 MMOL/L — SIGNIFICANT CHANGE UP (ref 96–108)
CO2 SERPL-SCNC: 28 MMOL/L — SIGNIFICANT CHANGE UP (ref 22–31)
COLOR SPEC: YELLOW — SIGNIFICANT CHANGE UP
CREAT SERPL-MCNC: 1.59 MG/DL — HIGH (ref 0.5–1.3)
DIFF PNL FLD: NEGATIVE — SIGNIFICANT CHANGE UP
EOSINOPHIL # BLD AUTO: 0.27 K/UL — SIGNIFICANT CHANGE UP (ref 0–0.5)
EOSINOPHIL NFR BLD AUTO: 2.6 % — SIGNIFICANT CHANGE UP (ref 0–6)
GLUCOSE SERPL-MCNC: 86 MG/DL — SIGNIFICANT CHANGE UP (ref 70–99)
GLUCOSE UR QL: NEGATIVE MG/DL — SIGNIFICANT CHANGE UP
HCT VFR BLD CALC: 46.3 % — SIGNIFICANT CHANGE UP (ref 39–50)
HGB BLD-MCNC: 15.5 G/DL — SIGNIFICANT CHANGE UP (ref 13–17)
IMM GRANULOCYTES NFR BLD AUTO: 0.9 % — SIGNIFICANT CHANGE UP (ref 0–1.5)
INR BLD: 0.93 RATIO — SIGNIFICANT CHANGE UP (ref 0.88–1.16)
KETONES UR-MCNC: NEGATIVE — SIGNIFICANT CHANGE UP
LEUKOCYTE ESTERASE UR-ACNC: NEGATIVE — SIGNIFICANT CHANGE UP
LYMPHOCYTES # BLD AUTO: 2.75 K/UL — SIGNIFICANT CHANGE UP (ref 1–3.3)
LYMPHOCYTES # BLD AUTO: 26.5 % — SIGNIFICANT CHANGE UP (ref 13–44)
MCHC RBC-ENTMCNC: 31.5 PG — SIGNIFICANT CHANGE UP (ref 27–34)
MCHC RBC-ENTMCNC: 33.5 GM/DL — SIGNIFICANT CHANGE UP (ref 32–36)
MCV RBC AUTO: 94.1 FL — SIGNIFICANT CHANGE UP (ref 80–100)
MONOCYTES # BLD AUTO: 1.25 K/UL — HIGH (ref 0–0.9)
MONOCYTES NFR BLD AUTO: 12 % — SIGNIFICANT CHANGE UP (ref 2–14)
NEUTROPHILS # BLD AUTO: 5.95 K/UL — SIGNIFICANT CHANGE UP (ref 1.8–7.4)
NEUTROPHILS NFR BLD AUTO: 57.2 % — SIGNIFICANT CHANGE UP (ref 43–77)
NITRITE UR-MCNC: NEGATIVE — SIGNIFICANT CHANGE UP
NRBC # BLD: 0 /100 WBCS — SIGNIFICANT CHANGE UP (ref 0–0)
PH UR: 5 — SIGNIFICANT CHANGE UP (ref 5–8)
PLATELET # BLD AUTO: 290 K/UL — SIGNIFICANT CHANGE UP (ref 150–400)
POTASSIUM SERPL-MCNC: 4 MMOL/L — SIGNIFICANT CHANGE UP (ref 3.5–5.3)
POTASSIUM SERPL-SCNC: 4 MMOL/L — SIGNIFICANT CHANGE UP (ref 3.5–5.3)
PROT SERPL-MCNC: 8.2 GM/DL — SIGNIFICANT CHANGE UP (ref 6–8.3)
PROT UR-MCNC: NEGATIVE MG/DL — SIGNIFICANT CHANGE UP
PROTHROM AB SERPL-ACNC: 10.3 SEC — SIGNIFICANT CHANGE UP (ref 10–12.9)
RBC # BLD: 4.92 M/UL — SIGNIFICANT CHANGE UP (ref 4.2–5.8)
RBC # FLD: 12.7 % — SIGNIFICANT CHANGE UP (ref 10.3–14.5)
SODIUM SERPL-SCNC: 141 MMOL/L — SIGNIFICANT CHANGE UP (ref 135–145)
SP GR SPEC: 1.01 — SIGNIFICANT CHANGE UP (ref 1.01–1.02)
TYPE + AB SCN PNL BLD: SIGNIFICANT CHANGE UP
UROBILINOGEN FLD QL: NEGATIVE MG/DL — SIGNIFICANT CHANGE UP
WBC # BLD: 10.39 K/UL — SIGNIFICANT CHANGE UP (ref 3.8–10.5)
WBC # FLD AUTO: 10.39 K/UL — SIGNIFICANT CHANGE UP (ref 3.8–10.5)

## 2019-02-04 PROCEDURE — 93010 ELECTROCARDIOGRAM REPORT: CPT

## 2019-02-04 PROCEDURE — 74174 CTA ABD&PLVS W/CONTRAST: CPT | Mod: 26

## 2019-02-04 PROCEDURE — 99285 EMERGENCY DEPT VISIT HI MDM: CPT

## 2019-02-04 RX ORDER — METOPROLOL TARTRATE 50 MG
100 TABLET ORAL EVERY 12 HOURS
Qty: 0 | Refills: 0 | Status: DISCONTINUED | OUTPATIENT
Start: 2019-02-04 | End: 2019-02-06

## 2019-02-04 RX ORDER — AMLODIPINE BESYLATE 2.5 MG/1
5 TABLET ORAL
Qty: 0 | Refills: 0 | Status: DISCONTINUED | OUTPATIENT
Start: 2019-02-04 | End: 2019-02-06

## 2019-02-04 RX ORDER — ATORVASTATIN CALCIUM 80 MG/1
10 TABLET, FILM COATED ORAL AT BEDTIME
Qty: 0 | Refills: 0 | Status: DISCONTINUED | OUTPATIENT
Start: 2019-02-04 | End: 2019-02-06

## 2019-02-04 RX ORDER — ONDANSETRON 8 MG/1
4 TABLET, FILM COATED ORAL EVERY 6 HOURS
Qty: 0 | Refills: 0 | Status: DISCONTINUED | OUTPATIENT
Start: 2019-02-04 | End: 2019-02-06

## 2019-02-04 RX ORDER — PANTOPRAZOLE SODIUM 20 MG/1
40 TABLET, DELAYED RELEASE ORAL ONCE
Qty: 0 | Refills: 0 | Status: COMPLETED | OUTPATIENT
Start: 2019-02-04 | End: 2019-02-04

## 2019-02-04 RX ORDER — SODIUM CHLORIDE 9 MG/ML
1000 INJECTION INTRAMUSCULAR; INTRAVENOUS; SUBCUTANEOUS ONCE
Qty: 0 | Refills: 0 | Status: COMPLETED | OUTPATIENT
Start: 2019-02-04 | End: 2019-02-04

## 2019-02-04 RX ORDER — SODIUM CHLORIDE 9 MG/ML
1000 INJECTION INTRAMUSCULAR; INTRAVENOUS; SUBCUTANEOUS
Qty: 0 | Refills: 0 | Status: DISCONTINUED | OUTPATIENT
Start: 2019-02-04 | End: 2019-02-04

## 2019-02-04 RX ORDER — HYDRALAZINE HCL 50 MG
0.5 TABLET ORAL
Qty: 0 | Refills: 0 | COMMUNITY

## 2019-02-04 RX ORDER — ALBUTEROL 90 UG/1
2 AEROSOL, METERED ORAL EVERY 6 HOURS
Qty: 0 | Refills: 0 | Status: DISCONTINUED | OUTPATIENT
Start: 2019-02-04 | End: 2019-02-06

## 2019-02-04 RX ORDER — FUROSEMIDE 40 MG
40 TABLET ORAL DAILY
Qty: 0 | Refills: 0 | Status: DISCONTINUED | OUTPATIENT
Start: 2019-02-04 | End: 2019-02-05

## 2019-02-04 RX ORDER — ACETAMINOPHEN 500 MG
650 TABLET ORAL EVERY 6 HOURS
Qty: 0 | Refills: 0 | Status: DISCONTINUED | OUTPATIENT
Start: 2019-02-04 | End: 2019-02-06

## 2019-02-04 RX ORDER — LOSARTAN POTASSIUM 100 MG/1
50 TABLET, FILM COATED ORAL DAILY
Qty: 0 | Refills: 0 | Status: DISCONTINUED | OUTPATIENT
Start: 2019-02-05 | End: 2019-02-06

## 2019-02-04 RX ORDER — HYDRALAZINE HCL 50 MG
25 TABLET ORAL EVERY 8 HOURS
Qty: 0 | Refills: 0 | Status: DISCONTINUED | OUTPATIENT
Start: 2019-02-04 | End: 2019-02-06

## 2019-02-04 RX ADMIN — SODIUM CHLORIDE 150 MILLILITER(S): 9 INJECTION INTRAMUSCULAR; INTRAVENOUS; SUBCUTANEOUS at 22:18

## 2019-02-04 RX ADMIN — PANTOPRAZOLE SODIUM 40 MILLIGRAM(S): 20 TABLET, DELAYED RELEASE ORAL at 20:55

## 2019-02-04 RX ADMIN — SODIUM CHLORIDE 1000 MILLILITER(S): 9 INJECTION INTRAMUSCULAR; INTRAVENOUS; SUBCUTANEOUS at 17:07

## 2019-02-04 NOTE — H&P ADULT - HISTORY OF PRESENT ILLNESS
52 yo male with PMH of systolic CHF, HTN, CKD, HLD, asthma presents to ED with complaint of BRBPR and hematuria. Pt states he had one episode of BRBPR yesterday. States blood was mixed with stool. No blood on toilet paper. No loose stools. No further BMs today and no further blood in stool. No abdominal pain. Pt has no previous colonoscopies or EGDs. Never seen GI in the past. This morning pt also noted to have pinkish tinge to urine x1 episode. Further urine normal. He is on ASA 81mg daily. No flank pain. Afebrile. No dysuria. No frequency. No chest pain or SOB.     In ED guaic negative. No active bleeding. Hgb normal. CTA with no acute bleeding but concern for right renal mass. UA with no blood noted.

## 2019-02-04 NOTE — ED STATDOCS - ATTENDING CONTRIBUTION TO CARE
I, Pradeep Taylor MD, personally saw the patient with ACP.  I have personally performed a face to face diagnostic evaluation on this patient.  I have reviewed the ACP note and agree with the history, exam, and plan of care, except as noted.

## 2019-02-04 NOTE — ED ADULT NURSE NOTE - NSIMPLEMENTINTERV_GEN_ALL_ED
Implemented All Universal Safety Interventions:  [place] to call system. Call bell, personal items and telephone within reach. Instruct patient to call for assistance. Room bathroom lighting operational. Non-slip footwear when patient is off stretcher. Physically safe environment: no spills, clutter or unnecessary equipment. Stretcher in lowest position, wheels locked, appropriate side rails in place.

## 2019-02-04 NOTE — ED STATDOCS - PHYSICAL EXAMINATION
***GEN - NAD; well appearing; A+O x3   ***PULMONARY - CTA b/l, symmetric breath sounds. ***CARDIAC -s1s2, RRR, no M,G,R  ***ABDOMEN - ND, NT, soft, no guarding, no rebound, no sushila's +periumbilical hernia, easily reducible    ***SKIN - no rash or bruising   ***NEUROLOGIC - alert and oriented, follows commands, sensation nl, motor nl, ***PSYCH - insight and judgment nl, memory nl, affect nl, thought nl

## 2019-02-04 NOTE — ED STATDOCS - MEDICAL DECISION MAKING DETAILS
Pt with BRB per rectum. VS stable per ASA. Given comorbidities, likely admission for concern for lower GI bleed.

## 2019-02-04 NOTE — H&P ADULT - PMH
Asthma    CHF (congestive heart failure)    CKD (chronic kidney disease)    Hyperlipidemia    Hypertension

## 2019-02-04 NOTE — H&P ADULT - NSHPPHYSICALEXAM_GEN_ALL_CORE
Vital Signs Last 24 Hrs  T(C): 36.4 (04 Feb 2019 21:17), Max: 36.6 (04 Feb 2019 16:05)  T(F): 97.6 (04 Feb 2019 21:17), Max: 97.8 (04 Feb 2019 16:05)  HR: 62 (04 Feb 2019 21:17) (59 - 62)  BP: 149/96 (04 Feb 2019 21:17) (149/96 - 152/89)  RR: 17 (04 Feb 2019 21:17) (17 - 18)  SpO2: 98% (04 Feb 2019 21:17) (96% - 98%)

## 2019-02-04 NOTE — ED ADULT TRIAGE NOTE - CHIEF COMPLAINT QUOTE
Pt presents to ED c/o BRB in stools and "pink tinged urine this am that has resolved". Pt denies CP, SOB. Pt denies blood thinners.

## 2019-02-04 NOTE — ED ADULT NURSE NOTE - OBJECTIVE STATEMENT
Pt reports to ED complaining of one episode of bright red stool.  Pt states that he had the episode yesterday and today he had some blood tinged urine. Pt states that when he last urinated, it was clear. Pt denies any SOB, CP.

## 2019-02-04 NOTE — ED ADULT NURSE NOTE - CHPI ED NUR SYMPTOMS NEG
no abdominal distension/no dysuria/no fever/no diarrhea/no chills/no vomiting/no burning urination/no hematuria/no nausea

## 2019-02-04 NOTE — H&P ADULT - ASSESSMENT
52 yo male with PMH of systolic CHF, HTN, CKD, HLD, asthma admitted with:    #Lower GI bleed x1 episode  - admit to med-surg  - currently no active bleeding  - clear liquid diet  - monitor H/H  - check stool occult  - GI consult in AM  - CTA with no acute bleeding noted    #Hematuria x1 and right renal mass seen on CT  - check US  - nephrology eval  - oncology eval    #CKD stage II  - Baseline Cr 1.5  - monitor renal panel    #HTN  - continue hydralazine, norvasc, losartan, metoprolol    #chronic systolic CHF  - appears euvolemic  - continue furosemide, BB    #DVT prophylaxis  - SCDs    IMPROVE VTE Individual Risk Assessment    RISK                                                                Points    [  ] Previous VTE                                                  3    [  ] Thrombophilia                                               2    [  ] Lower limb paralysis                                      2        (unable to hold up >15 seconds)      [  ] Current Cancer                                              2         (within 6 months)    [  ] Immobilization > 24 hrs                                1    [  ] ICU/CCU stay > 24 hours                              1    [  ] Age > 60                                                      1    IMPROVE VTE Score ____0_____

## 2019-02-04 NOTE — ED STATDOCS - NS_ ATTENDINGSCRIBEDETAILS _ED_A_ED_FT
The scribe's documentation has been prepared under my direction and personally reviewed by me in its entirety. I confirm that the note above accurately reflects all work, treatment, procedures, and medical decision-making performed by me.  Pradeep Taylor MD

## 2019-02-04 NOTE — ED STATDOCS - OBJECTIVE STATEMENT
52 y/o male with a PMHx of heart failure, HTN, asthma presents to the ED c/o BRB in urine and stool since last night. Last night, pt reports some blood in stool and while in the shower. Pt's urine was pink in color this morning. Pt reports he is in no pain. No dysuria, chest pain, SOB. No hx of hemorrhoids. On 81mg ASA. Cardio: Striz[place]. 54 y/o male with a PMHx of heart failure, HTN, asthma presents to the ED c/o BRB in urine and stool since last night. Last night, pt reports some blood in stool while in the shower. Pt's urine was pink in color this morning. Pt reports he is in no pain. No dysuria, chest pain, SOB. No hx of hemorrhoids. On 81mg ASA. Cardio: [place].

## 2019-02-04 NOTE — ED STATDOCS - PROGRESS NOTE DETAILS
MALIA Fishman:   Patient has been seen, evaluated and orders have been written by the attending in intake. Patient is stable.  I will follow up the results of orders written and I will continue to evaluate/observe the patient.   Zofia Fishman PA-C guaiac Card.  Lot 135 Exp: 6/30/2019.  SKIP madison PA-C Guaiac Negative.  Maroon liquid stool palp.  Neg masses.  Neg extermal lesions.  Card.  Lot 135 Exp: 6/30/2019.  SKIP madison PA-C. HGb 15.5 / Hct 46.3.  BUn 25, Cr 1.59.  U/A negative for blood, leuks.  CT scan with R renal mass 2.3 cm.  No obvious active arterial bleeding.  No acute bowel abnormality.    discussed with Family that inpatient admission recommended.  Pt now agreeable.  IV Protinx given,  Discussed with Dr. Dumont for admission.  Will give IVF if needed.  Zofia Fishman PA-C

## 2019-02-05 DIAGNOSIS — Z98.52 VASECTOMY STATUS: Chronic | ICD-10-CM

## 2019-02-05 LAB
ANION GAP SERPL CALC-SCNC: 8 MMOL/L — SIGNIFICANT CHANGE UP (ref 5–17)
BASOPHILS # BLD AUTO: 0.09 K/UL — SIGNIFICANT CHANGE UP (ref 0–0.2)
BASOPHILS NFR BLD AUTO: 1 % — SIGNIFICANT CHANGE UP (ref 0–2)
BUN SERPL-MCNC: 20 MG/DL — SIGNIFICANT CHANGE UP (ref 7–23)
CALCIUM SERPL-MCNC: 8.5 MG/DL — SIGNIFICANT CHANGE UP (ref 8.5–10.1)
CHLORIDE SERPL-SCNC: 108 MMOL/L — SIGNIFICANT CHANGE UP (ref 96–108)
CO2 SERPL-SCNC: 25 MMOL/L — SIGNIFICANT CHANGE UP (ref 22–31)
CREAT SERPL-MCNC: 1.4 MG/DL — HIGH (ref 0.5–1.3)
EOSINOPHIL # BLD AUTO: 0.27 K/UL — SIGNIFICANT CHANGE UP (ref 0–0.5)
EOSINOPHIL NFR BLD AUTO: 3 % — SIGNIFICANT CHANGE UP (ref 0–6)
GLUCOSE SERPL-MCNC: 105 MG/DL — HIGH (ref 70–99)
HCT VFR BLD CALC: 42.4 % — SIGNIFICANT CHANGE UP (ref 39–50)
HCV AB S/CO SERPL IA: 0.12 S/CO — SIGNIFICANT CHANGE UP
HCV AB SERPL-IMP: SIGNIFICANT CHANGE UP
HGB BLD-MCNC: 14.1 G/DL — SIGNIFICANT CHANGE UP (ref 13–17)
IMM GRANULOCYTES NFR BLD AUTO: 0.7 % — SIGNIFICANT CHANGE UP (ref 0–1.5)
LYMPHOCYTES # BLD AUTO: 2.08 K/UL — SIGNIFICANT CHANGE UP (ref 1–3.3)
LYMPHOCYTES # BLD AUTO: 23.4 % — SIGNIFICANT CHANGE UP (ref 13–44)
MCHC RBC-ENTMCNC: 30.9 PG — SIGNIFICANT CHANGE UP (ref 27–34)
MCHC RBC-ENTMCNC: 33.3 GM/DL — SIGNIFICANT CHANGE UP (ref 32–36)
MCV RBC AUTO: 93 FL — SIGNIFICANT CHANGE UP (ref 80–100)
MONOCYTES # BLD AUTO: 1.25 K/UL — HIGH (ref 0–0.9)
MONOCYTES NFR BLD AUTO: 14 % — SIGNIFICANT CHANGE UP (ref 2–14)
NEUTROPHILS # BLD AUTO: 5.15 K/UL — SIGNIFICANT CHANGE UP (ref 1.8–7.4)
NEUTROPHILS NFR BLD AUTO: 57.9 % — SIGNIFICANT CHANGE UP (ref 43–77)
NRBC # BLD: 0 /100 WBCS — SIGNIFICANT CHANGE UP (ref 0–0)
PLATELET # BLD AUTO: 230 K/UL — SIGNIFICANT CHANGE UP (ref 150–400)
POTASSIUM SERPL-MCNC: 3.5 MMOL/L — SIGNIFICANT CHANGE UP (ref 3.5–5.3)
POTASSIUM SERPL-SCNC: 3.5 MMOL/L — SIGNIFICANT CHANGE UP (ref 3.5–5.3)
RBC # BLD: 4.56 M/UL — SIGNIFICANT CHANGE UP (ref 4.2–5.8)
RBC # FLD: 12.7 % — SIGNIFICANT CHANGE UP (ref 10.3–14.5)
SODIUM SERPL-SCNC: 141 MMOL/L — SIGNIFICANT CHANGE UP (ref 135–145)
WBC # BLD: 8.9 K/UL — SIGNIFICANT CHANGE UP (ref 3.8–10.5)
WBC # FLD AUTO: 8.9 K/UL — SIGNIFICANT CHANGE UP (ref 3.8–10.5)

## 2019-02-05 PROCEDURE — 76770 US EXAM ABDO BACK WALL COMP: CPT | Mod: 26

## 2019-02-05 RX ORDER — SOD SULF/SODIUM/NAHCO3/KCL/PEG
4000 SOLUTION, RECONSTITUTED, ORAL ORAL ONCE
Qty: 0 | Refills: 0 | Status: COMPLETED | OUTPATIENT
Start: 2019-02-05 | End: 2019-02-05

## 2019-02-05 RX ADMIN — Medication 25 MILLIGRAM(S): at 14:46

## 2019-02-05 RX ADMIN — Medication 25 MILLIGRAM(S): at 05:37

## 2019-02-05 RX ADMIN — AMLODIPINE BESYLATE 5 MILLIGRAM(S): 2.5 TABLET ORAL at 18:50

## 2019-02-05 RX ADMIN — ATORVASTATIN CALCIUM 10 MILLIGRAM(S): 80 TABLET, FILM COATED ORAL at 21:52

## 2019-02-05 RX ADMIN — Medication 4000 MILLILITER(S): at 19:03

## 2019-02-05 RX ADMIN — Medication 10 MILLIGRAM(S): at 17:52

## 2019-02-05 RX ADMIN — LOSARTAN POTASSIUM 50 MILLIGRAM(S): 100 TABLET, FILM COATED ORAL at 21:52

## 2019-02-05 RX ADMIN — Medication 25 MILLIGRAM(S): at 21:52

## 2019-02-05 RX ADMIN — Medication 100 MILLIGRAM(S): at 17:52

## 2019-02-05 RX ADMIN — AMLODIPINE BESYLATE 5 MILLIGRAM(S): 2.5 TABLET ORAL at 05:38

## 2019-02-05 NOTE — CONSULT NOTE ADULT - SUBJECTIVE AND OBJECTIVE BOX
NEPHROLOGY CONSULT  HPI:  54 yo male with PMH of systolic CHF, HTN, CKD, HLD, asthma presents to ED with complaint of BRBPR and hematuria. Pt states he had one episode of BRBPR yesterday. States blood was mixed with stool. No blood on toilet paper. No loose stools. No further BMs today and no further blood in stool. No abdominal pain. Pt has no previous colonoscopies or EGDs. Never seen GI in the past. This morning pt also noted to have pinkish tinge to urine x1 episode. Further urine normal. He is on ASA 81mg daily. No flank pain. Afebrile. No dysuria. No frequency. No chest pain or SOB.  Above information obtained from the chart  Patient was in 2018 admission for hypertension and chronic kidney disease.  Patient is now admitted with hematochezia and hematuria.  Interestingly his stool guaiac was negative and urinalysis was negative for blood.  Workup showed that the patient has a 2.2 cm vascular renal mass on the right which was not there in the 2018 study.  [place] the patient was scheduled for an outpatient robotic partial nephrectomy of the mass.  Patient has history of chronic kidney disease 3 most likely due to history of uncontrolled hypertension and morbid obesity.  Since his last admission patient has lost approximately pounds by dieting and monitoring his activities.  The patient is reluctant to have the procedure and immediately several absences from his employment due to health issues  In ED guaic negative. No active bleeding. Hgb normal. CTA with no acute bleeding but concern for right renal mass. UA with no blood noted. (2019 22:41)      PAST MEDICAL & SURGICAL HISTORY:  CKD (chronic kidney disease)  Hyperlipidemia  Hypertension  CHF (congestive heart failure)  Asthma  H/O vasectomy: 22 years ago      FAMILY HISTORY:  Family history of melanoma (Mother)      MEDICATIONS  (STANDING):  amLODIPine   Tablet 5 milliGRAM(s) Oral <User Schedule>  atorvastatin 10 milliGRAM(s) Oral at bedtime  hydrALAZINE 25 milliGRAM(s) Oral every 8 hours  losartan 50 milliGRAM(s) Oral daily  metoprolol succinate  milliGRAM(s) Oral every 12 hours  polyethylene glycol/electrolyte Solution. 4000 milliLiter(s) Oral once    MEDICATIONS  (PRN):  acetaminophen   Tablet .. 650 milliGRAM(s) Oral every 6 hours PRN Temp greater or equal to 38C (100.4F), Mild Pain (1 - 3)  ALBUTerol    90 MICROgram(s) HFA Inhaler 2 Puff(s) Inhalation every 6 hours PRN for shortness of breath and/or wheezing  ondansetron Injectable 4 milliGRAM(s) IV Push every 6 hours PRN Nausea      Allergies    No Known Allergies    Intolerances        I&O's Summary    2019 07:01  -  2019 07:00  --------------------------------------------------------  IN: 0 mL / OUT: 600 mL / NET: -600 mL          REVIEW OF SYSTEMS:    CONSTITUTIONAL:  As per HPI.  CONSTITUTIONAL: No weakness, fevers or chills  EYES/ENT: No visual changes;  No vertigo or throat pain   NECK: No pain or stiffness  CARDIOVASCULAR: No chest pain or palpitations  GASTROINTESTINAL: No abdominal or epigastric pain. No nausea, vomiting, or hematemesis; No diarrhea or constipation. No melena or hematochezia.  GENITOURINARY: No dysuria, frequency or hematuria  NEUROLOGICAL: No numbness or weakness  SKIN: No itching, burning, rashes, or lesions   All other review of systems is negative unless indicated above      Vital Signs Last 24 Hrs  T(C): 36.4 (2019 17:26), Max: 36.4 (2019 21:17)  T(F): 97.5 (2019 17:26), Max: 97.6 (2019 21:17)  HR: 57 (2019 17:26) (55 - 63)  BP: 125/64 (2019 17:26) (125/64 - 168/94)  BP(mean): --  RR: 16 (2019 17:26) (16 - 17)  SpO2: 96% (2019 17:26) (96% - 100%)  Daily     Daily   I&O's Summary    2019 07:01  -  2019 07:00  --------------------------------------------------------  IN: 0 mL / OUT: 600 mL / NET: -600 mL        PHYSICAL EXAM:    General:  Alert, well-developed ,No acute distress.    Neuro:  Alert and oriented to person, place, and time. Able to communicate  well. Cranial nerves 2-12 grossly intact. 5/5 strength in all  extremities bilaterally. Sensation intact in all extremities.  Appropriate affect.     HEENT:  No JVD, no masses, Eyes anicteric, No carotid bruits.No lymphadenopathy,    Cardiovascular:  Regular rate and rhythm, with normal S1 and S2. No murmurs, rubs,  or gallops. No JVD.     Lungs:  clear. no rales, no wheezing, .    Abdomen:  Normoactive bowel sounds. Soft, flat, non-tender, and non-distended.  No hepatosplenomegaly, positive bowel sounds    Skin:  Warm, dry, well-perfused. No rashes or other lesions.     Extremities:  2+ pulses in upper and lower extremities. No lower extremity pain or  edema; legs are symmetric in appearance.    LABS:                        14.1   8.90  )-----------( 230      ( 2019 05:28 )             42.4     02-05    141  |  108  |  20  ----------------------------<  105<H>  3.5   |  25  |  1.40<H>    Ca    8.5      2019 05:28    TPro  8.2  /  Alb  4.4  /  TBili  0.4  /  DBili  x   /  AST  23  /  ALT  39  /  AlkPhos  87  02-04    PT/INR - ( 2019 17:06 )   PT: 10.3 sec;   INR: 0.93 ratio         PTT - ( 2019 17:06 )  PTT:31.0 sec  Urinalysis Basic - ( 2019 17:55 )    Color: Yellow / Appearance: Clear / S.010 / pH: x  Gluc: x / Ketone: Negative  / Bili: Negative / Urobili: Negative mg/dL   Blood: x / Protein: Negative mg/dL / Nitrite: Negative   Leuk Esterase: Negative / RBC: x / WBC x   Sq Epi: x / Non Sq Epi: x / Bacteria: x

## 2019-02-05 NOTE — CONSULT NOTE ADULT - SUBJECTIVE AND OBJECTIVE BOX
This is a 53-year-old man presenting after an episode of bright red blood per rectum which happened on the evening of 2/3/2019.  The following day he passed small amount of blood in the urine and decided to come to the hospital.  He denies any abdominal pain there was no rectal pain, or diarrhea, or constipation associated with the bleeding.  He has not passed any blood since that time.  His hemoglobin was found to be normal in the emergency room and a CAT scan was performed showing a renal lesion concerning for cancer, but with no significant bowel findings.  He has never had a colonoscopy.  He states that he has lost about 50 pounds June due to a special diet he is on.  He denies any heartburn, reflux, early satiety, dysphagia.    PAST MEDICAL & SURGICAL HISTORY:  CKD (chronic kidney disease)  Hyperlipidemia  Hypertension  CHF (congestive heart failure)  Asthma  H/O vasectomy: 22 years ago    Home Medications:  aspirin 81 mg oral delayed release tablet: 1 tab(s) orally once a day (04 Feb 2019 22:30)  hydrALAZINE 25 mg oral tablet: 1 tab(s) orally every 8 hours (04 Feb 2019 22:30)  losartan 50 mg oral tablet: 1 tab(s) orally once a day (04 Feb 2019 22:30)  ProAir HFA 90 mcg/inh inhalation aerosol: 2 puff(s) inhaled 4 times a day, As Needed - for shortness of breath and/or wheezing (04 Feb 2019 22:30)    Allergies    No Known Allergies    Intolerances    FAMILY HISTORY:  Family history of melanoma (Mother)  No FHx GI cancer.    Soc:   Works as a   No current cigarette smoking but did smoke on and off throughout most of his life, stopped about 1 year ago.  Drinks alcohol socially    ROS: Otherwise negative, all systems reviewed    PHYSICAL EXAM:  Vital Signs Last 24 Hrs  T(C): 36.4 (05 Feb 2019 10:55), Max: 36.4 (04 Feb 2019 21:17)  T(F): 97.6 (05 Feb 2019 10:55), Max: 97.6 (04 Feb 2019 21:17)  HR: 55 (05 Feb 2019 14:48) (55 - 63)  BP: 143/86 (05 Feb 2019 14:48) (129/64 - 168/94)  BP(mean): --  RR: 16 (05 Feb 2019 10:55) (16 - 17)  SpO2: 97% (05 Feb 2019 10:55) (97% - 100%)    Constitutional: No acute distress, alert and oriented ×3    Eyes: Anicteric    Neck: Supple, no JVD    Respiratory: Clear to auscultation bilaterally, breathing comfortably    Cardiovascular: Regular rate and rhythm, no murmur    Gastrointestinal: Soft.  Nontender, nondistended, bowel sounds present.  No mass.  No hepatosplenomegaly.  Large umbilical hernia noted.    Rectal: Deferred, done in ER and reportedly negative.    Extremities: Warm, well-perfused, no edema    Neurological: Grossly intact, no focal deficits    Skin: No lesion or rash    Lymph Nodes: No cervical or supraclavicular lymphadenopathy    Psychiatric: Mood and affect seem normal                          14.1   8.90  )-----------( 230      ( 05 Feb 2019 05:28 )             42.4     02-05    141  |  108  |  20  ----------------------------<  105<H>  3.5   |  25  |  1.40<H>    Ca    8.5      05 Feb 2019 05:28    TPro  8.2  /  Alb  4.4  /  TBili  0.4  /  DBili  x   /  AST  23  /  ALT  39  /  AlkPhos  87  02-04      EXAM:  CT ANGIO ABD PELVIS (W)AW IC                            PROCEDURE DATE:  02/04/2019          INTERPRETATION:  CLINICAL INFORMATION: 53-year-old male with bloody   stool.         COMPARISON: None.    PROCEDURE:   CT of the Abdomen and Pelvis was performed with and without intravenous   contrast.  Precontrast, Arterial and Delayed phases were performed.  Intravenous contrast: 90 ml Omnipaque 350. 10 ml discarded.  Oral contrast: None.  Sagittal and coronal reformats were performed.    FINDINGS:    LOWER CHEST: Minor atelectasis at the right lung base. Small hiatal   hernia.    LIVER: Within normal limits.  BILE DUCTS: Normal caliber.  GALLBLADDER: Within normal limits.  SPLEEN: Within normal limits.  PANCREAS: Within normal limits.  ADRENALS: Within normal limits.  KIDNEYS/URETERS: No renal calculus or hydronephrosis. Enhancing mass in   the interpolar region of the right kidney measuring 2.2 cm, concerning   for renal cell carcinoma. Subcentimeter renal lesions are formally too   small to characterize, likely cysts.    BLADDER: Within normal limits.  REPRODUCTIVE ORGANS: Prostate within normal limits.    BOWEL: No bowel obstruction. Appendix is normal. No evidence of active GI   bleed.  PERITONEUM: No ascites.  VESSELS:  Within normal limits.  RETROPERITONEUM: No lymphadenopathy.    ABDOMINAL WALL: Fat-containing umbilical hernia with stranding of the fat   within the hernia sac, consistent with inflammatory change.  BONES: Within normal limits.    IMPRESSION:   *  No evidence of active GI bleed.  *  Enhancing mass in the interpolar region of the right kidney measuring   2.2 cm, concerning for renal cell carcinoma.  *  Large fat-containing umbilical hernia.

## 2019-02-05 NOTE — PROGRESS NOTE ADULT - SUBJECTIVE AND OBJECTIVE BOX
GI    pt seen and examined  full consult note to follow    BRBPR  hgb stable  for colonoscopy tomorrow  bowel prep written

## 2019-02-05 NOTE — CONSULT NOTE ADULT - SUBJECTIVE AND OBJECTIVE BOX
53 year old man with hx of heart failure and HTN in hospital with bloody stools and "pink urine".   He underwent a CT scan with contrast that revealed a solid enhancing 2.2 cm mass in the right kidney.   he has no evidence of metastases or lymphadenopathy.   He is scheduled for colonoscopy tomorrow.  Currently urine is clear  Creatinine is 1.4  eGFR is 57  Hct stable and normal 42.4  Abd: soft, NT, NS  Ext: 0 C/C/E  CT:  Solid 2.2 cm renal mass right kidney.     I personally reviewed the CT and the mass is amenable to robotic partial nephrectomy.   this can be scheduled electively.  He will need:  1.   Mag 3 renal scan without lasix for differential renal function  2.  urine for cytology  3.   needs a cystoscopy to complete hematuria workup as an outpatient  4. I will see him tomorrow to set him up for this as an outpatient

## 2019-02-05 NOTE — CONSULT NOTE ADULT - ASSESSMENT
54 yo male with PMH of systolic CHF, HTN, CKD, HLD, asthma presents to ED with complaint of BRBPR and hematuria. Pt states he had one episode of BRBPR yesterday. States blood was mixed with stool. No blood on toilet paper. No loose stools. No further BMs today and no further blood in stool. No abdominal pain. Pt has no previous colonoscopies or EGDs. Never seen GI in the past. This morning pt also noted to have pinkish tinge to urine x1 episode. Further urine normal. He is on ASA 81mg daily. No flank pain. Afebrile. No dysuria. No frequency. No chest pain or SOB.  Above information obtained from the chart  Patient was in June 2018 admission for hypertension and chronic kidney disease.  Patient is now admitted with hematochezia and hematuria.  Interestingly his stool guaiac was negative and urinalysis was negative for blood.  Workup showed that the patient has a 2.2 cm vascular renal mass on the right which was not there in the June 2018 study.  The patient was scheduled for an outpatient robotic partial nephrectomy of the mass.  Patient has history of chronic kidney disease 3 most likely due to history of uncontrolled hypertension and morbid obesity.  Since his last admission patient has lost approximately 50 pounds by dieting and monitoring his activities.  The patient is reluctant to have the procedure and immediately several absences from his employment due to health issues.

## 2019-02-05 NOTE — CONSULT NOTE ADULT - ASSESSMENT
This is a 53-year-old man with one episode of painless rectal bleeding, and a right kidney mass concerning for renal cell carcinoma on CAT scan.  Regarding the rectal bleeding, this may be an anorectal source such as hemorrhoids, cannot rule out diverticulosis, polyp, neoplasm, angiodysplasia.  He has never had a colonoscopy.    Recommendations:    -He was recommended to undergo an elective colonoscopy  -He would like to perform the test tomorrow  -Risks benefits alternatives of colonoscopy were discussed in detail including the risk of bleeding, infection, perforation  -Bowel prep as written and he is nothing by mouth after midnight  -He will undergo an outpatient workup for the renal mass per urology notes  Discussed at bedside with Dr. Curtis    Thank you for the courtesy of this referral.    Jesus Underwood MD  [phone]

## 2019-02-05 NOTE — PROGRESS NOTE ADULT - SUBJECTIVE AND OBJECTIVE BOX
CC- BRBPR and hematuria (2019 13:04)    HPI:  54 yo male with PMH of systolic CHF, HTN, CKD, HLD, asthma presents to ED with complaint of BRBPR and hematuria. Pt states he had one episode of BRBPR yesterday. States blood was mixed with stool. No blood on toilet paper. No loose stools. No further BMs today and no further blood in stool. No abdominal pain. Pt has no previous colonoscopies or EGDs. Never seen GI in the past. This morning pt also noted to have pinkish tinge to urine x1 episode. Further urine normal. He is on ASA 81mg daily. No flank pain. Afebrile. No dysuria. No frequency. No chest pain or SOB.   In ED guaic negative. No active bleeding. Hgb normal. CTA with no acute bleeding but concern for right renal mass. UA with no blood noted. (2019 22:41)    19- no further bleeding, no hematuria.     Review of system- All 10 systems reviewed and is as per HPI otherwise negative.     T(C): 36.4 (19 @ 10:55), Max: 36.6 (19 @ 16:05)  HR: 58 (19 @ 10:55) (57 - 63)  BP: 168/94 (19 @ 10:55) (129/64 - 168/94)  RR: 16 (19 @ 10:55) (16 - 18)  SpO2: 97% (19 @ 10:55) (96% - 100%)  Wt(kg): --    LABS:                        14.1   8.90  )-----------( 230      ( 2019 05:28 )             42.4         141  |  108  |  20  ----------------------------<  105<H>  3.5   |  25  |  1.40<H>    Ca    8.5      2019 05:28    TPro  8.2  /  Alb  4.4  /  TBili  0.4  /  DBili  x   /  AST  23  /  ALT  39  /  AlkPhos  87  02-    PT/INR - ( 2019 17:06 )   PT: 10.3 sec;   INR: 0.93 ratio       PTT - ( 2019 17:06 )  PTT:31.0 sec    Urinalysis Basic - ( 2019 17:55 )  Color: Yellow / Appearance: Clear / S.010 / pH: x  Gluc: x / Ketone: Negative  / Bili: Negative / Urobili: Negative mg/dL   Blood: x / Protein: Negative mg/dL / Nitrite: Negative   Leuk Esterase: Negative / RBC: x / WBC x   Sq Epi: x / Non Sq Epi: x / Bacteria: x    RADIOLOGY & ADDITIONAL TESTS:  EXAM:  CT ANGIO ABD PELVIS (W)AW IC                        PROCEDURE DATE:  2019    INTERPRETATION:  CLINICAL INFORMATION: 53-year-old male with bloody   stool.         COMPARISON: None.    PROCEDURE:   CT of the Abdomen and Pelvis was performed with and without intravenous   contrast.  Precontrast, Arterial and Delayed phases were performed.  Intravenous contrast: 90 ml Omnipaque 350. 10 ml discarded.  Oral contrast: None.  Sagittal and coronal reformats were performed.    FINDINGS:    LOWER CHEST: Minor atelectasis at the right lung base. Small hiatal   hernia.    LIVER: Within normal limits.  BILE DUCTS: Normal caliber.  GALLBLADDER: Within normal limits.  SPLEEN: Within normal limits.  PANCREAS: Within normal limits.  ADRENALS: Within normal limits.  KIDNEYS/URETERS: No renal calculus or hydronephrosis. Enhancing mass in   the interpolar region of the right kidney measuring 2.2 cm, concerning   for renal cell carcinoma. Subcentimeter renal lesions are formally too   small to characterize, likely cysts.    BLADDER: Within normal limits.  REPRODUCTIVE ORGANS: Prostate within normal limits.    BOWEL: No bowel obstruction. Appendix is normal. No evidence of active GI   bleed.  PERITONEUM: No ascites.  VESSELS:  Within normal limits.  RETROPERITONEUM: No lymphadenopathy.    ABDOMINAL WALL: Fat-containing umbilical hernia with stranding of the fat   within the hernia sac, consistent with inflammatory change.  BONES: Within normal limits.    IMPRESSION:   *  No evidence of active GI bleed.  *  Enhancing mass in the interpolar region of the right kidney measuring   2.2 cm, concerning for renal cell carcinoma.  *  Large fat-containing umbilical hernia.    EXAM:  US KIDNEYS AND BLADDER                        PROCEDURE DATE:  2019    INTERPRETATION:  CLINICAL INFORMATION: Evaluate right renal mass    COMPARISON: CT of prior day    TECHNIQUE: Sonography of the kidneys and bladder.     FINDINGS:    Right kidney:  12.5 cm. Reidentified is a solid mass within the midpole   of the right kidney measuring 2.0 x 1.9 x 2.2 cm and demonstrating   vascularity, highly suspicious for neoplasm. No hydronephrosis.    Left kidney:  12.1 cm. No renal mass, hydronephrosis or calculi.    Urinary bladder: Within normal limits.    IMPRESSION:     Reidentified is a solid mass within the midpole of the right kidney   measuring 2.0 x 1.9 x 2.2 cm and demonstrating vascularity, highly   suspicious for neoplasm.     PHYSICAL EXAM:  GENERAL: NAD, well-groomed, well-developed  HEAD:  Atraumatic, Normocephalic  EYES: EOMI, PERRLA, conjunctiva and sclera clear  HEENT: Moist mucous membranes  NECK: Supple, No JVD  NERVOUS SYSTEM:  Alert & Oriented X3, Motor Strength 5/5 B/L upper and lower extremities; DTRs 2+ intact and symmetric  CHEST/LUNG: Clear to auscultation bilaterally; No rales, rhonchi, wheezing, or rubs  HEART: Regular rate and rhythm; No murmurs, rubs, or gallops  ABDOMEN: Soft, Nontender, Nondistended; Bowel sounds present  GENITOURINARY- Voiding, no palpable bladder  EXTREMITIES:  2+ Peripheral Pulses, No clubbing, cyanosis, or edema  MUSCULOSKELTAL- No muscle tenderness, Muscle tone normal, No joint tenderness, no Joint swelling, Joint range of motion-normal  SKIN-no rash, no lesion  CNS- alert, oriented X3, non focal     Daily Height in cm: 185.42 (2019 15:52)      MEDICATIONS  (STANDING):  amLODIPine   Tablet 5 milliGRAM(s) Oral <User Schedule>  atorvastatin 10 milliGRAM(s) Oral at bedtime  bisacodyl 10 milliGRAM(s) Oral once  hydrALAZINE 25 milliGRAM(s) Oral every 8 hours  losartan 50 milliGRAM(s) Oral daily  metoprolol succinate  milliGRAM(s) Oral every 12 hours  polyethylene glycol/electrolyte Solution. 4000 milliLiter(s) Oral once    MEDICATIONS  (PRN):  acetaminophen   Tablet .. 650 milliGRAM(s) Oral every 6 hours PRN Temp greater or equal to 38C (100.4F), Mild Pain (1 - 3)  ALBUTerol    90 MICROgram(s) HFA Inhaler 2 Puff(s) Inhalation every 6 hours PRN for shortness of breath and/or wheezing  ondansetron Injectable 4 milliGRAM(s) IV Push every 6 hours PRN Nausea    Assessment/Plan  #RT kidney mass  #Hematuria resolved  Imaging studies reviewed  High suspicion for RCC  Urology eval DR Esquivel appreciated  Pt is candidate for robotic partial nephrectomy  Renal scan without lasix ordered  Patient had recent card cath and ECHO which were normal in case needs clearance  Patient offered to be scheduled for surgery end of this week, to make decision    #GI bleed  HH stable  GI eval DR Underwood appreciated  For colonoscopy tomorrow    #CKD stage 3  Stable Creatinine    #Dispo- for colonoscopy tomorrow. To be scheduled for robotic partial nephrectomy soon as outpatient CC- BRBPR and hematuria (2019 13:04)    HPI:  54 yo male with PMH of systolic CHF, HTN, CKD, HLD, asthma presents to ED with complaint of BRBPR and hematuria. Pt states he had one episode of BRBPR yesterday. States blood was mixed with stool. No blood on toilet paper. No loose stools. No further BMs today and no further blood in stool. No abdominal pain. Pt has no previous colonoscopies or EGDs. Never seen GI in the past. This morning pt also noted to have pinkish tinge to urine x1 episode. Further urine normal. He is on ASA 81mg daily. No flank pain. Afebrile. No dysuria. No frequency. No chest pain or SOB.   In ED guaic negative. No active bleeding. Hgb normal. CTA with no acute bleeding but concern for right renal mass. UA with no blood noted. (2019 22:41)    19- no further bleeding, no hematuria.     Review of system- All 10 systems reviewed and is as per HPI otherwise negative.     T(C): 36.4 (19 @ 10:55), Max: 36.6 (19 @ 16:05)  HR: 58 (19 @ 10:55) (57 - 63)  BP: 168/94 (19 @ 10:55) (129/64 - 168/94)  RR: 16 (19 @ 10:55) (16 - 18)  SpO2: 97% (19 @ 10:55) (96% - 100%)  Wt(kg): --    LABS:                        14.1   8.90  )-----------( 230      ( 2019 05:28 )             42.4         141  |  108  |  20  ----------------------------<  105<H>  3.5   |  25  |  1.40<H>    Ca    8.5      2019 05:28    TPro  8.2  /  Alb  4.4  /  TBili  0.4  /  DBili  x   /  AST  23  /  ALT  39  /  AlkPhos  87  02-    PT/INR - ( 2019 17:06 )   PT: 10.3 sec;   INR: 0.93 ratio       PTT - ( 2019 17:06 )  PTT:31.0 sec    Urinalysis Basic - ( 2019 17:55 )  Color: Yellow / Appearance: Clear / S.010 / pH: x  Gluc: x / Ketone: Negative  / Bili: Negative / Urobili: Negative mg/dL   Blood: x / Protein: Negative mg/dL / Nitrite: Negative   Leuk Esterase: Negative / RBC: x / WBC x   Sq Epi: x / Non Sq Epi: x / Bacteria: x    RADIOLOGY & ADDITIONAL TESTS:  EXAM:  CT ANGIO ABD PELVIS (W)AW IC                        PROCEDURE DATE:  2019    INTERPRETATION:  CLINICAL INFORMATION: 53-year-old male with bloody   stool.         COMPARISON: None.    PROCEDURE:   CT of the Abdomen and Pelvis was performed with and without intravenous   contrast.  Precontrast, Arterial and Delayed phases were performed.  Intravenous contrast: 90 ml Omnipaque 350. 10 ml discarded.  Oral contrast: None.  Sagittal and coronal reformats were performed.    FINDINGS:    LOWER CHEST: Minor atelectasis at the right lung base. Small hiatal   hernia.    LIVER: Within normal limits.  BILE DUCTS: Normal caliber.  GALLBLADDER: Within normal limits.  SPLEEN: Within normal limits.  PANCREAS: Within normal limits.  ADRENALS: Within normal limits.  KIDNEYS/URETERS: No renal calculus or hydronephrosis. Enhancing mass in   the interpolar region of the right kidney measuring 2.2 cm, concerning   for renal cell carcinoma. Subcentimeter renal lesions are formally too   small to characterize, likely cysts.    BLADDER: Within normal limits.  REPRODUCTIVE ORGANS: Prostate within normal limits.    BOWEL: No bowel obstruction. Appendix is normal. No evidence of active GI   bleed.  PERITONEUM: No ascites.  VESSELS:  Within normal limits.  RETROPERITONEUM: No lymphadenopathy.    ABDOMINAL WALL: Fat-containing umbilical hernia with stranding of the fat   within the hernia sac, consistent with inflammatory change.  BONES: Within normal limits.    IMPRESSION:   *  No evidence of active GI bleed.  *  Enhancing mass in the interpolar region of the right kidney measuring   2.2 cm, concerning for renal cell carcinoma.  *  Large fat-containing umbilical hernia.    EXAM:  US KIDNEYS AND BLADDER                        PROCEDURE DATE:  2019    INTERPRETATION:  CLINICAL INFORMATION: Evaluate right renal mass    COMPARISON: CT of prior day    TECHNIQUE: Sonography of the kidneys and bladder.     FINDINGS:    Right kidney:  12.5 cm. Reidentified is a solid mass within the midpole   of the right kidney measuring 2.0 x 1.9 x 2.2 cm and demonstrating   vascularity, highly suspicious for neoplasm. No hydronephrosis.    Left kidney:  12.1 cm. No renal mass, hydronephrosis or calculi.    Urinary bladder: Within normal limits.    IMPRESSION:     Reidentified is a solid mass within the midpole of the right kidney   measuring 2.0 x 1.9 x 2.2 cm and demonstrating vascularity, highly   suspicious for neoplasm.     PHYSICAL EXAM:  GENERAL: NAD, well-groomed, well-developed  HEAD:  Atraumatic, Normocephalic  EYES: EOMI, PERRLA, conjunctiva and sclera clear  HEENT: Moist mucous membranes  NECK: Supple, No JVD  NERVOUS SYSTEM:  Alert & Oriented X3, Motor Strength 5/5 B/L upper and lower extremities; DTRs 2+ intact and symmetric  CHEST/LUNG: Clear to auscultation bilaterally; No rales, rhonchi, wheezing, or rubs  HEART: Regular rate and rhythm; No murmurs, rubs, or gallops  ABDOMEN: Soft, Nontender, Nondistended; Bowel sounds present  GENITOURINARY- Voiding, no palpable bladder  EXTREMITIES:  2+ Peripheral Pulses, No clubbing, cyanosis, or edema  MUSCULOSKELTAL- No muscle tenderness, Muscle tone normal, No joint tenderness, no Joint swelling, Joint range of motion-normal  SKIN-no rash, no lesion  CNS- alert, oriented X3, non focal     Daily Height in cm: 185.42 (2019 15:52)      MEDICATIONS  (STANDING):  amLODIPine   Tablet 5 milliGRAM(s) Oral <User Schedule>  atorvastatin 10 milliGRAM(s) Oral at bedtime  bisacodyl 10 milliGRAM(s) Oral once  hydrALAZINE 25 milliGRAM(s) Oral every 8 hours  losartan 50 milliGRAM(s) Oral daily  metoprolol succinate  milliGRAM(s) Oral every 12 hours  polyethylene glycol/electrolyte Solution. 4000 milliLiter(s) Oral once    MEDICATIONS  (PRN):  acetaminophen   Tablet .. 650 milliGRAM(s) Oral every 6 hours PRN Temp greater or equal to 38C (100.4F), Mild Pain (1 - 3)  ALBUTerol    90 MICROgram(s) HFA Inhaler 2 Puff(s) Inhalation every 6 hours PRN for shortness of breath and/or wheezing  ondansetron Injectable 4 milliGRAM(s) IV Push every 6 hours PRN Nausea    Assessment/Plan  #RT kidney mass  #Hematuria resolved  Imaging studies reviewed  High suspicion for RCC  Urology eval DR Esquivel appreciated  Pt is candidate for robotic partial nephrectomy  Renal scan without lasix ordered  Patient had recent card cath and ECHO which were normal in case needs clearance  Patient offered to be scheduled for surgery end of this week, to make decision    #GI bleed  HH stable  GI eval DR Underwood appreciated  For colonoscopy tomorrow    #CKD probably stage 2  Renal eval Dr Kaye pending    #Dispo- for colonoscopy tomorrow. To be scheduled for robotic partial nephrectomy soon as outpatient

## 2019-02-06 ENCOUNTER — TRANSCRIPTION ENCOUNTER (OUTPATIENT)
Age: 54
End: 2019-02-06

## 2019-02-06 ENCOUNTER — INBOUND DOCUMENT (OUTPATIENT)
Age: 54
End: 2019-02-06

## 2019-02-06 VITALS
SYSTOLIC BLOOD PRESSURE: 121 MMHG | HEART RATE: 67 BPM | OXYGEN SATURATION: 98 % | RESPIRATION RATE: 16 BRPM | DIASTOLIC BLOOD PRESSURE: 79 MMHG | TEMPERATURE: 98 F

## 2019-02-06 DIAGNOSIS — K62.5 HEMORRHAGE OF ANUS AND RECTUM: ICD-10-CM

## 2019-02-06 DIAGNOSIS — N28.89 OTHER SPECIFIED DISORDERS OF KIDNEY AND URETER: ICD-10-CM

## 2019-02-06 PROCEDURE — 78707 K FLOW/FUNCT IMAGE W/O DRUG: CPT | Mod: 26

## 2019-02-06 RX ADMIN — Medication 100 MILLIGRAM(S): at 05:47

## 2019-02-06 RX ADMIN — Medication 25 MILLIGRAM(S): at 12:11

## 2019-02-06 RX ADMIN — Medication 25 MILLIGRAM(S): at 05:48

## 2019-02-06 RX ADMIN — AMLODIPINE BESYLATE 5 MILLIGRAM(S): 2.5 TABLET ORAL at 05:48

## 2019-02-06 NOTE — PROGRESS NOTE ADULT - SUBJECTIVE AND OBJECTIVE BOX
CC- BRBPR and hematuria (2019 13:04)    HPI:  52 yo male with PMH of systolic CHF, HTN, CKD, HLD, asthma presents to ED with complaint of BRBPR and hematuria. Pt states he had one episode of BRBPR yesterday. States blood was mixed with stool. No blood on toilet paper. No loose stools. No further BMs today and no further blood in stool. No abdominal pain. Pt has no previous colonoscopies or EGDs. Never seen GI in the past. This morning pt also noted to have pinkish tinge to urine x1 episode. Further urine normal. He is on ASA 81mg daily. No flank pain. Afebrile. No dysuria. No frequency. No chest pain or SOB.   In ED guaic negative. No active bleeding. Hgb normal. CTA with no acute bleeding but concern for right renal mass. UA with no blood noted. (2019 22:41)    19- no further bleeding, no hematuria.   19 s/p colonoscopy. Wants to go home    Review of system- All 10 systems reviewed and is as per HPI otherwise negative.     Vital Signs Last 24 Hrs  T(C): 36.5 (2019 12:08), Max: 36.5 (2019 12:08)  T(F): 97.7 (2019 12:08), Max: 97.7 (2019 12:08)  HR: 54 (2019 12:08) (54 - 62)  BP: 138/84 (2019 12:08) (125/64 - 160/89)  BP(mean): --  RR: 16 (2019 05:15) (16 - 16)  SpO2: 97% (2019 05:15) (96% - 99%)    LABS:                                   14.1   8.90  )-----------( 230      ( 2019 05:28 )             42.4     2019 05:28    141    |  108    |  20     ----------------------------<  105    3.5     |  25     |  1.40     Ca    8.5        2019 05:28    TPro  8.2    /  Alb  4.4    /  TBili  0.4    /  DBili  x      /  AST  23     /  ALT  39     /  AlkPhos  87     2019 17:06    LIVER FUNCTIONS - ( 2019 17:06 )  Alb: 4.4 g/dL / Pro: 8.2 gm/dL / ALK PHOS: 87 U/L / ALT: 39 U/L / AST: 23 U/L / GGT: x           PT/INR - ( 2019 17:06 )   PT: 10.3 sec;   INR: 0.93 ratio      PTT - ( 2019 17:06 )  PTT:31.0 sec    Urinalysis Basic - ( 2019 17:55 )  Color: Yellow / Appearance: Clear / S.010 / pH: x  Gluc: x / Ketone: Negative  / Bili: Negative / Urobili: Negative mg/dL   Blood: x / Protein: Negative mg/dL / Nitrite: Negative   Leuk Esterase: Negative / RBC: x / WBC x   Sq Epi: x / Non Sq Epi: x / Bacteria: x    RADIOLOGY & ADDITIONAL TESTS:  EXAM:  CT ANGIO ABD PELVIS (W)AW IC                        PROCEDURE DATE:  2019    INTERPRETATION:  CLINICAL INFORMATION: 53-year-old male with bloody   stool.         COMPARISON: None.    PROCEDURE:   CT of the Abdomen and Pelvis was performed with and without intravenous   contrast.  Precontrast, Arterial and Delayed phases were performed.  Intravenous contrast: 90 ml Omnipaque 350. 10 ml discarded.  Oral contrast: None.  Sagittal and coronal reformats were performed.    FINDINGS:    LOWER CHEST: Minor atelectasis at the right lung base. Small hiatal   hernia.    LIVER: Within normal limits.  BILE DUCTS: Normal caliber.  GALLBLADDER: Within normal limits.  SPLEEN: Within normal limits.  PANCREAS: Within normal limits.  ADRENALS: Within normal limits.  KIDNEYS/URETERS: No renal calculus or hydronephrosis. Enhancing mass in   the interpolar region of the right kidney measuring 2.2 cm, concerning   for renal cell carcinoma. Subcentimeter renal lesions are formally too   small to characterize, likely cysts.    BLADDER: Within normal limits.  REPRODUCTIVE ORGANS: Prostate within normal limits.    BOWEL: No bowel obstruction. Appendix is normal. No evidence of active GI   bleed.  PERITONEUM: No ascites.  VESSELS:  Within normal limits.  RETROPERITONEUM: No lymphadenopathy.    ABDOMINAL WALL: Fat-containing umbilical hernia with stranding of the fat   within the hernia sac, consistent with inflammatory change.  BONES: Within normal limits.    IMPRESSION:   *  No evidence of active GI bleed.  *  Enhancing mass in the interpolar region of the right kidney measuring   2.2 cm, concerning for renal cell carcinoma.  *  Large fat-containing umbilical hernia.    EXAM:  US KIDNEYS AND BLADDER                        PROCEDURE DATE:  2019    INTERPRETATION:  CLINICAL INFORMATION: Evaluate right renal mass    COMPARISON: CT of prior day    TECHNIQUE: Sonography of the kidneys and bladder.     FINDINGS:    Right kidney:  12.5 cm. Reidentified is a solid mass within the midpole   of the right kidney measuring 2.0 x 1.9 x 2.2 cm and demonstrating   vascularity, highly suspicious for neoplasm. No hydronephrosis.    Left kidney:  12.1 cm. No renal mass, hydronephrosis or calculi.    Urinary bladder: Within normal limits.    IMPRESSION:     Reidentified is a solid mass within the midpole of the right kidney   measuring 2.0 x 1.9 x 2.2 cm and demonstrating vascularity, highly   suspicious for neoplasm.     PHYSICAL EXAM:  GENERAL: NAD, well-groomed, well-developed  HEAD:  Atraumatic, Normocephalic  EYES: EOMI, PERRLA, conjunctiva and sclera clear  HEENT: Moist mucous membranes  NECK: Supple, No JVD  NERVOUS SYSTEM:  Alert & Oriented X3, Motor Strength 5/5 B/L upper and lower extremities; DTRs 2+ intact and symmetric  CHEST/LUNG: Clear to auscultation bilaterally; No rales, rhonchi, wheezing, or rubs  HEART: Regular rate and rhythm; No murmurs, rubs, or gallops  ABDOMEN: Soft, Nontender, Nondistended; Bowel sounds present  GENITOURINARY- Voiding, no palpable bladder  EXTREMITIES:  2+ Peripheral Pulses, No clubbing, cyanosis, or edema  MUSCULOSKELTAL- No muscle tenderness, Muscle tone normal, No joint tenderness, no Joint swelling, Joint range of motion-normal  SKIN-no rash, no lesion  CNS- alert, oriented X3, non focal     Daily Height in cm: 185.42 (2019 15:52)      MEDICATIONS  (STANDING):  amLODIPine   Tablet 5 milliGRAM(s) Oral <User Schedule>  atorvastatin 10 milliGRAM(s) Oral at bedtime  bisacodyl 10 milliGRAM(s) Oral once  hydrALAZINE 25 milliGRAM(s) Oral every 8 hours  losartan 50 milliGRAM(s) Oral daily  metoprolol succinate  milliGRAM(s) Oral every 12 hours  polyethylene glycol/electrolyte Solution. 4000 milliLiter(s) Oral once    MEDICATIONS  (PRN):  acetaminophen   Tablet .. 650 milliGRAM(s) Oral every 6 hours PRN Temp greater or equal to 38C (100.4F), Mild Pain (1 - 3)  ALBUTerol    90 MICROgram(s) HFA Inhaler 2 Puff(s) Inhalation every 6 hours PRN for shortness of breath and/or wheezing  ondansetron Injectable 4 milliGRAM(s) IV Push every 6 hours PRN Nausea    Assessment/Plan  #RT kidney mass, possible RCC  #Hematuria resolved  Renal scan reviewed  D/w pt and wife at bedside and they are aware of possibility of malignancy- to make appointment with DR Esquivel next Wednesday to schedule partial robotic nephrectomy    #GI bleed, likely 2 to internal hemorrhoids  HH stable  GI eval DR Underwood appreciated  Colonoscopy- internal hemorrhoids, no active bleeding    #CKD probably stage 2  Renal eval Dr Kaye appreciated    #CHF chronic diastolic dysfunction  Resume PO lasix on discharge  Compensated    #Asthma- stable    #HTN- controlled    #Dispo- home today. To see Dr Esquivel next Wednesday to set up nephrectomy as outpatient. Patient and wife agreeable with plan of care. DC time 40 mins.

## 2019-02-06 NOTE — DISCHARGE NOTE ADULT - CARE PLAN
Principal Discharge DX:	Renal mass, right  Goal:	Schedule outpatient appointment with urologist  Assessment and plan of treatment:	Call DR Esquivel [phone] to set up appointment for 2/13/19

## 2019-02-06 NOTE — PROGRESS NOTE ADULT - ASSESSMENT
54 yo male with PMH of systolic CHF, HTN, CKD, HLD, asthma presents to ED with complaint of BRBPR and hematuria. Pt states he had one episode of BRBPR yesterday. States blood was mixed with stool. No blood on toilet paper. No loose stools. No further BMs today and no further blood in stool. No abdominal pain. Pt has no previous colonoscopies or EGDs. Never seen GI in the past. This morning pt also noted to have pinkish tinge to urine x1 episode. Further urine normal. He is on ASA 81mg daily. No flank pain. Afebrile. No dysuria. No frequency. No chest pain or SOB.  Above information obtained from the chart  Patient was in June 2018 admission for hypertension and chronic kidney disease.  Patient is now admitted with hematochezia and hematuria.  Interestingly his stool guaiac was negative and urinalysis was negative for blood.  Workup showed that the patient has a 2.2 cm vascular renal mass on the right which was not there in the June 2018 study.  The patient was scheduled for an outpatient robotic partial nephrectomy of the mass.  Patient has history of chronic kidney disease 3 most likely due to history of uncontrolled hypertension and morbid obesity.  Since his last admission patient has lost approximately 50 pounds by dieting and monitoring his activities.  The patient is reluctant to have the procedure and immediately several absences from his employment due to health issues.    2/6 MK  - CKD stage 3 , stable, fu trend of scr with IV contrast given  - BRBPR; for colonscopy today  - hematuria with new 2.2 cm right renal mass: for oupt intervention   no renal barrier for dc and plan for close oupt fu of bloodwork and nephrology fu

## 2019-02-06 NOTE — CONSULT NOTE ADULT - SUBJECTIVE AND OBJECTIVE BOX
Patient is a 53y old  Male who presents with a chief complaint of BRBPR and hematuria (05 Feb 2019 18:18)      HPI:  54 yo male with PMH of systolic CHF, HTN, CKD, HLD, asthma presents to ED with complaint of BRBPR and hematuria. Pt has no previous colonoscopies or EGDs. Never seen GI in the past. This morning pt also noted to have pinkish tinge to urine x1 episode. Further urine normal. He is on ASA 81mg daily. No flank pain. Afebrile. No dysuria. No frequency. No chest pain or SOB.     Ct scans show a 2.2 cm right sided solid and vascular renal mass.    s/b Dr Deal from urology and advised robotic surgery    pt is due to colonoscopy today    feels well         PAST MEDICAL & SURGICAL HISTORY:  CKD (chronic kidney disease)  Hyperlipidemia  Hypertension  CHF (congestive heart failure)  Asthma  H/O vasectomy: 22 years ago      REVIEW OF SYSTEMS    General:	The patient feels well.  no unexpected weight loss. Appetite good. no night sweats.  Skin: no Rash.	  ENT: no sore throat or oral pain. no difficulty with swallowing  Respiratory: No chest pain, No shortness of breath, no hemoptysis, no cough, no chest pain.  Cardiovascular : No chest pain. no palpitation.  Gastrointestinal:  No anorexia. no pain, + blood in stool.   Genitourinary: +hematuria , no dysuria	  Musculoskeletal: No myalgia, no arthralgia, no back pain, no joint swelling  Neurological: no headaches, no dizzyness, no weakness, no double vision. 	  Psychiatric: no change in mood. 		  Endocrine:	no burning in urine or frequency  Allergic/Immunologic:	 no fever.     Allergies    No Known Allergies    Intolerances        FAMILY HISTORY:  Family history of melanoma (Mother)      Home Medications:  aspirin 81 mg oral delayed release tablet: 1 tab(s) orally once a day (04 Feb 2019 22:30)  hydrALAZINE 25 mg oral tablet: 1 tab(s) orally every 8 hours (04 Feb 2019 22:30)  losartan 50 mg oral tablet: 1 tab(s) orally once a day (04 Feb 2019 22:30)  ProAir HFA 90 mcg/inh inhalation aerosol: 2 puff(s) inhaled 4 times a day, As Needed - for shortness of breath and/or wheezing (04 Feb 2019 22:30)      MEDICATIONS  (STANDING):  amLODIPine   Tablet 5 milliGRAM(s) Oral <User Schedule>  atorvastatin 10 milliGRAM(s) Oral at bedtime  hydrALAZINE 25 milliGRAM(s) Oral every 8 hours  losartan 50 milliGRAM(s) Oral daily  metoprolol succinate  milliGRAM(s) Oral every 12 hours    MEDICATIONS  (PRN):  acetaminophen   Tablet .. 650 milliGRAM(s) Oral every 6 hours PRN Temp greater or equal to 38C (100.4F), Mild Pain (1 - 3)  ALBUTerol    90 MICROgram(s) HFA Inhaler 2 Puff(s) Inhalation every 6 hours PRN for shortness of breath and/or wheezing  ondansetron Injectable 4 milliGRAM(s) IV Push every 6 hours PRN Nausea      PHYSICAL EXAM:  Vital Signs Last 24 Hrs  T(C): 36.4 (06 Feb 2019 05:15), Max: 36.4 (05 Feb 2019 10:55)  T(F): 97.5 (06 Feb 2019 05:15), Max: 97.6 (05 Feb 2019 10:55)  HR: 57 (06 Feb 2019 05:15) (55 - 62)  BP: 131/82 (06 Feb 2019 05:15) (125/64 - 168/94)  BP(mean): --  RR: 16 (06 Feb 2019 05:15) (16 - 16)  SpO2: 97% (06 Feb 2019 05:15) (96% - 99%)        Gen: somewhat overweight. comfortable  HEENT: normocephalic/atraumatic, no conjunctival pallor, no scleral icterus, no oral thrush/mucosal bleeding/mucositis  Neck: supple, no masses, no JVD  Breasts: deferred  Back: nontender  Cardiovascular: heart sounds Normal S1S2, no murmurs/rubs/gallops  Respiratory: air entry normal and equal on both sides. no wheeze, no ronchi,   Gastrointestinal: Umbilical hernia. BS+, soft, NT/ND, no masses, no splenomegaly, no hepatomegaly,   no evidence for ascites  Genitourinary: deferred  Rectal: deferred  Extremities: no clubbing/cyanosis, no edema, no calf tenderness  Neurological:  Alert oriented X3 cranial nerves normal. no focal deficits  Skin: no rash on visible skin  Lymph Nodes:  no cervical/supraclavicular LAD, no axillary/groin LAD  Musculoskeletal:  full ROM  Psychiatric:  mood appears normal. not obviously anxious or depressed.        LABS:                        14.1   8.90  )-----------( 230      ( 05 Feb 2019 05:28 )             42.4     05 Feb 2019 05:28    141    |  108    |  20     ----------------------------<  105    3.5     |  25     |  1.40     Ca    8.5        05 Feb 2019 05:28    TPro  8.2    /  Alb  4.4    /  TBili  0.4    /  DBili  x      /  AST  23     /  ALT  39     /  AlkPhos  87     04 Feb 2019 17:06    LIVER FUNCTIONS - ( 04 Feb 2019 17:06 )  Alb: 4.4 g/dL / Pro: 8.2 gm/dL / ALK PHOS: 87 U/L / ALT: 39 U/L / AST: 23 U/L / GGT: x           PT/INR - ( 04 Feb 2019 17:06 )   PT: 10.3 sec;   INR: 0.93 ratio         PTT - ( 04 Feb 2019 17:06 )  PTT:31.0 sec      RADIOLOGY & ADDITIONAL STUDIES:    Ct abdomen  IMPRESSION:   * No evidence of active GI bleed.   * Enhancing mass in the interpolar region of the right kidney measuring 2.2   cm, concerning for renal cell carcinoma.   * Large fat-containing umbilical hernia.

## 2019-02-06 NOTE — DISCHARGE NOTE ADULT - PROVIDER TOKENS
PROVIDER:[TOKEN:[[id number]]],PROVIDER:[TOKEN:[[id number]]],PROVIDER:[TOKEN:[[id number]]]

## 2019-02-06 NOTE — DISCHARGE NOTE ADULT - CARE PROVIDER_API CALL
Juan M Esquivel)  Urology  [address] [place] [address]  Phone: [phone]  Fax: [phone]  Follow Up Time:     Sarkis Kaye (DO)  Nephrology  [address] [facility], [address]  [place] [address]  Phone: [phone]  Fax: [phone]  Follow Up Time:     Ling Bey)  Hematology; Internal Medicine; Medical Oncology  [address] [facility], [address] [place] [address]  Phone: [phone]  Fax: [phone]  Follow Up Time:

## 2019-02-06 NOTE — CONSULT NOTE ADULT - PROBLEM SELECTOR RECOMMENDATION 9
2.2 cm solid and vascular. likely RCC  agree with plans for robotic partial nephrectomy in a relatively young patient  consider CT chest noncontrast to rule out asymptomatic pulmonary mets  no role for systemic chemotherapy or biological therapy in absence of metastatic disease,

## 2019-02-06 NOTE — DISCHARGE NOTE ADULT - PATIENT PORTAL LINK FT
You can access the [facility] Patient Portal, offered by [facility], by registering with the following website: [email or web address]

## 2019-02-06 NOTE — PROGRESS NOTE ADULT - SUBJECTIVE AND OBJECTIVE BOX
NEPHROLOGY INTERVAL HPI/OVERNIGHT EVENTS:  19 @ 12:33  await colonscopy today  for  intervention as oupt  possible dc today  no further episode of hematuria and BRBPR      MEDICATIONS  (STANDING):  amLODIPine   Tablet 5 milliGRAM(s) Oral <User Schedule>  atorvastatin 10 milliGRAM(s) Oral at bedtime  hydrALAZINE 25 milliGRAM(s) Oral every 8 hours  losartan 50 milliGRAM(s) Oral daily  metoprolol succinate  milliGRAM(s) Oral every 12 hours    MEDICATIONS  (PRN):  acetaminophen   Tablet .. 650 milliGRAM(s) Oral every 6 hours PRN Temp greater or equal to 38C (100.4F), Mild Pain (1 - 3)  ALBUTerol    90 MICROgram(s) HFA Inhaler 2 Puff(s) Inhalation every 6 hours PRN for shortness of breath and/or wheezing  ondansetron Injectable 4 milliGRAM(s) IV Push every 6 hours PRN Nausea      Allergies    No Known Allergies    Intolerances        I&O's Detail    2019 07:01  -  2019 07:00  --------------------------------------------------------  IN:    Oral Fluid: 650 mL  Total IN: 650 mL    OUT:  Total OUT: 0 mL    Total NET: 650 mL          .    Vital Signs Last 24 Hrs  T(C): 36.5 (2019 12:08), Max: 36.5 (2019 12:08)  T(F): 97.7 (2019 12:08), Max: 97.7 (2019 12:08)  HR: 54 (2019 12:08) (54 - 62)  BP: 138/84 (2019 12:08) (125/64 - 160/89)  BP(mean): --  RR: 16 (2019 05:15) (16 - 16)  SpO2: 97% (2019 05:15) (96% - 99%)  Daily     Daily     PHYSICAL EXAM:  General: alert. awake Ox3  HEENT: MMM  CV: s1s2 rrr  LUNGS: B/L CTA  EXT: no edema    LABS:                        14.1   8.90  )-----------( 230      ( 2019 05:28 )             42.4     02-    141  |  108  |  20  ----------------------------<  105<H>  3.5   |  25  |  1.40<H>    Ca    8.5      2019 05:28    TPro  8.2  /  Alb  4.4  /  TBili  0.4  /  DBili  x   /  AST  23  /  ALT  39  /  AlkPhos  87  02-04    PT/INR - ( 2019 17:06 )   PT: 10.3 sec;   INR: 0.93 ratio         PTT - ( 2019 17:06 )  PTT:31.0 sec  Urinalysis Basic - ( 2019 17:55 )    Color: Yellow / Appearance: Clear / S.010 / pH: x  Gluc: x / Ketone: Negative  / Bili: Negative / Urobili: Negative mg/dL   Blood: x / Protein: Negative mg/dL / Nitrite: Negative   Leuk Esterase: Negative / RBC: x / WBC x   Sq Epi: x / Non Sq Epi: x / Bacteria: x

## 2019-02-06 NOTE — CONSULT NOTE ADULT - PROBLEM SELECTOR RECOMMENDATION 2
due for colonoscopy  shall need genetic testing as outpatient if positive for GI malignancy as well.

## 2019-02-06 NOTE — DISCHARGE NOTE ADULT - PLAN OF CARE
Schedule outpatient appointment with urologist Call DR Esquivel [phone] to set up appointment for 2/13/19

## 2019-02-06 NOTE — DISCHARGE NOTE ADULT - CARE PROVIDERS DIRECT ADDRESSES
,[email or web address],DirectAddress_Unknown,DirectAddress_Unknown

## 2019-02-06 NOTE — DISCHARGE NOTE ADULT - HOSPITAL COURSE
Patient admitted with lower GI bleed. Underwent colonoscopy- +Internal hemorrhoids, no active bleeding. Incidentally found RT renal mass, possible RCC, seen by urologist, to be set up as outpatient for partial robotic nephrectomy. Clinical diagnosis and outpatient f/u outlined to patient and wife at bedside

## 2019-02-06 NOTE — DISCHARGE NOTE ADULT - MEDICATION SUMMARY - MEDICATIONS TO TAKE
I will START or STAY ON the medications listed below when I get home from the hospital:    aspirin 81 mg oral delayed release tablet  -- 1 tab(s) by mouth once a day  -- Indication: For Cardiac    losartan 50 mg oral tablet  -- 1 tab(s) by mouth once a day  -- Indication: For HTN    atorvastatin 10 mg oral tablet  -- 1 tab(s) by mouth once a day (at bedtime)  -- Indication: For CHolesterol    metoprolol succinate 100 mg oral tablet, extended release  -- 1 tab(s) by mouth 2 times a day  -- Indication: For HTN    ProAir HFA 90 mcg/inh inhalation aerosol  -- 2 puff(s) inhaled 4 times a day, As Needed - for shortness of breath and/or wheezing  -- Indication: For asthma    Norvasc 5 mg oral tablet  -- 1 tab(s) by mouth 2 times a day   -- Indication: For HTN    furosemide 40 mg oral tablet  -- 1 tab(s) by mouth once a day  -- Indication: For CHF (congestive heart failure)    hydrALAZINE 25 mg oral tablet  -- 1 tab(s) by mouth every 8 hours  -- Indication: For HTN

## 2019-02-07 PROBLEM — E78.5 HYPERLIPIDEMIA, UNSPECIFIED: Chronic | Status: ACTIVE | Noted: 2019-02-04

## 2019-02-07 PROBLEM — I50.9 HEART FAILURE, UNSPECIFIED: Chronic | Status: ACTIVE | Noted: 2019-02-04

## 2019-02-07 PROBLEM — N18.9 CHRONIC KIDNEY DISEASE, UNSPECIFIED: Chronic | Status: ACTIVE | Noted: 2019-02-04

## 2019-02-07 PROBLEM — I10 ESSENTIAL (PRIMARY) HYPERTENSION: Chronic | Status: ACTIVE | Noted: 2019-02-04

## 2019-02-11 DIAGNOSIS — Z79.82 LONG TERM (CURRENT) USE OF ASPIRIN: ICD-10-CM

## 2019-02-11 DIAGNOSIS — I13.0 HYPERTENSIVE HEART AND CHRONIC KIDNEY DISEASE WITH HEART FAILURE AND STAGE 1 THROUGH STAGE 4 CHRONIC KIDNEY DISEASE, OR UNSPECIFIED CHRONIC KIDNEY DISEASE: ICD-10-CM

## 2019-02-11 DIAGNOSIS — E66.01 MORBID (SEVERE) OBESITY DUE TO EXCESS CALORIES: ICD-10-CM

## 2019-02-11 DIAGNOSIS — N28.89 OTHER SPECIFIED DISORDERS OF KIDNEY AND URETER: ICD-10-CM

## 2019-02-11 DIAGNOSIS — N18.3 CHRONIC KIDNEY DISEASE, STAGE 3 (MODERATE): ICD-10-CM

## 2019-02-11 DIAGNOSIS — K92.2 GASTROINTESTINAL HEMORRHAGE, UNSPECIFIED: ICD-10-CM

## 2019-02-11 DIAGNOSIS — K64.9 UNSPECIFIED HEMORRHOIDS: ICD-10-CM

## 2019-02-11 DIAGNOSIS — J45.909 UNSPECIFIED ASTHMA, UNCOMPLICATED: ICD-10-CM

## 2019-02-11 DIAGNOSIS — K92.1 MELENA: ICD-10-CM

## 2019-02-11 DIAGNOSIS — I50.22 CHRONIC SYSTOLIC (CONGESTIVE) HEART FAILURE: ICD-10-CM

## 2019-02-13 ENCOUNTER — APPOINTMENT (OUTPATIENT)
Dept: UROLOGY | Facility: CLINIC | Age: 54
End: 2019-02-13
Payer: COMMERCIAL

## 2019-02-13 VITALS
BODY MASS INDEX: 31.68 KG/M2 | DIASTOLIC BLOOD PRESSURE: 73 MMHG | HEIGHT: 73 IN | WEIGHT: 239 LBS | SYSTOLIC BLOOD PRESSURE: 143 MMHG | HEART RATE: 57 BPM

## 2019-02-13 PROCEDURE — 99204 OFFICE O/P NEW MOD 45 MIN: CPT

## 2019-02-15 ENCOUNTER — OTHER (OUTPATIENT)
Age: 54
End: 2019-02-15

## 2019-02-19 ENCOUNTER — APPOINTMENT (OUTPATIENT)
Dept: INTERNAL MEDICINE | Facility: CLINIC | Age: 54
End: 2019-02-19

## 2019-02-20 ENCOUNTER — OTHER (OUTPATIENT)
Age: 54
End: 2019-02-20

## 2019-02-20 ENCOUNTER — INPATIENT (INPATIENT)
Facility: HOSPITAL | Age: 54
LOS: 0 days | Discharge: HOME | End: 2019-02-21
Attending: UROLOGY | Admitting: UROLOGY
Payer: COMMERCIAL

## 2019-02-20 ENCOUNTER — RESULT REVIEW (OUTPATIENT)
Age: 54
End: 2019-02-20

## 2019-02-20 ENCOUNTER — APPOINTMENT (OUTPATIENT)
Dept: UROLOGY | Facility: HOSPITAL | Age: 54
End: 2019-02-20

## 2019-02-20 VITALS
WEIGHT: 238.98 LBS | HEIGHT: 73 IN | OXYGEN SATURATION: 98 % | HEART RATE: 64 BPM | RESPIRATION RATE: 16 BRPM | TEMPERATURE: 98 F | SYSTOLIC BLOOD PRESSURE: 145 MMHG | DIASTOLIC BLOOD PRESSURE: 92 MMHG

## 2019-02-20 DIAGNOSIS — Z98.52 VASECTOMY STATUS: Chronic | ICD-10-CM

## 2019-02-20 LAB
ALLERGY+IMMUNOLOGY DIAG STUDY NOTE: SIGNIFICANT CHANGE UP
ANION GAP SERPL CALC-SCNC: 7 MMOL/L — SIGNIFICANT CHANGE UP (ref 5–17)
BUN SERPL-MCNC: 22 MG/DL — SIGNIFICANT CHANGE UP (ref 7–23)
CALCIUM SERPL-MCNC: 9 MG/DL — SIGNIFICANT CHANGE UP (ref 8.5–10.1)
CHLORIDE SERPL-SCNC: 110 MMOL/L — HIGH (ref 96–108)
CO2 SERPL-SCNC: 24 MMOL/L — SIGNIFICANT CHANGE UP (ref 22–31)
CREAT SERPL-MCNC: 1.86 MG/DL — HIGH (ref 0.5–1.3)
GLUCOSE SERPL-MCNC: 111 MG/DL — HIGH (ref 70–99)
HCT VFR BLD CALC: 44.3 % — SIGNIFICANT CHANGE UP (ref 39–50)
HGB BLD-MCNC: 14.6 G/DL — SIGNIFICANT CHANGE UP (ref 13–17)
MCHC RBC-ENTMCNC: 31.5 PG — SIGNIFICANT CHANGE UP (ref 27–34)
MCHC RBC-ENTMCNC: 33 GM/DL — SIGNIFICANT CHANGE UP (ref 32–36)
MCV RBC AUTO: 95.5 FL — SIGNIFICANT CHANGE UP (ref 80–100)
NRBC # BLD: 0 /100 WBCS — SIGNIFICANT CHANGE UP (ref 0–0)
PLATELET # BLD AUTO: 236 K/UL — SIGNIFICANT CHANGE UP (ref 150–400)
POTASSIUM SERPL-MCNC: 4.6 MMOL/L — SIGNIFICANT CHANGE UP (ref 3.5–5.3)
POTASSIUM SERPL-SCNC: 4.6 MMOL/L — SIGNIFICANT CHANGE UP (ref 3.5–5.3)
RBC # BLD: 4.64 M/UL — SIGNIFICANT CHANGE UP (ref 4.2–5.8)
RBC # FLD: 12.6 % — SIGNIFICANT CHANGE UP (ref 10.3–14.5)
SODIUM SERPL-SCNC: 141 MMOL/L — SIGNIFICANT CHANGE UP (ref 135–145)
WBC # BLD: 10.98 K/UL — HIGH (ref 3.8–10.5)
WBC # FLD AUTO: 10.98 K/UL — HIGH (ref 3.8–10.5)

## 2019-02-20 PROCEDURE — 76998 US GUIDE INTRAOP: CPT | Mod: 26

## 2019-02-20 PROCEDURE — 50543 LAPARO PARTIAL NEPHRECTOMY: CPT | Mod: AS,RT

## 2019-02-20 PROCEDURE — 50543 LAPARO PARTIAL NEPHRECTOMY: CPT | Mod: RT

## 2019-02-20 PROCEDURE — 88307 TISSUE EXAM BY PATHOLOGIST: CPT | Mod: 26

## 2019-02-20 RX ORDER — SODIUM CHLORIDE 9 MG/ML
500 INJECTION INTRAMUSCULAR; INTRAVENOUS; SUBCUTANEOUS ONCE
Qty: 0 | Refills: 0 | Status: COMPLETED | OUTPATIENT
Start: 2019-02-20 | End: 2019-02-20

## 2019-02-20 RX ORDER — ATORVASTATIN CALCIUM 80 MG/1
10 TABLET, FILM COATED ORAL AT BEDTIME
Qty: 0 | Refills: 0 | Status: DISCONTINUED | OUTPATIENT
Start: 2019-02-20 | End: 2019-02-21

## 2019-02-20 RX ORDER — LOSARTAN POTASSIUM 100 MG/1
50 TABLET, FILM COATED ORAL DAILY
Qty: 0 | Refills: 0 | Status: DISCONTINUED | OUTPATIENT
Start: 2019-02-20 | End: 2019-02-21

## 2019-02-20 RX ORDER — HYDROMORPHONE HYDROCHLORIDE 2 MG/ML
30 INJECTION INTRAMUSCULAR; INTRAVENOUS; SUBCUTANEOUS
Qty: 0 | Refills: 0 | Status: DISCONTINUED | OUTPATIENT
Start: 2019-02-20 | End: 2019-02-21

## 2019-02-20 RX ORDER — ONDANSETRON 8 MG/1
4 TABLET, FILM COATED ORAL ONCE
Qty: 0 | Refills: 0 | Status: DISCONTINUED | OUTPATIENT
Start: 2019-02-20 | End: 2019-02-20

## 2019-02-20 RX ORDER — LOSARTAN POTASSIUM 100 MG/1
1 TABLET, FILM COATED ORAL
Qty: 0 | Refills: 0 | COMMUNITY

## 2019-02-20 RX ORDER — AMLODIPINE BESYLATE 2.5 MG/1
5 TABLET ORAL DAILY
Qty: 0 | Refills: 0 | Status: DISCONTINUED | OUTPATIENT
Start: 2019-02-20 | End: 2019-02-21

## 2019-02-20 RX ORDER — SODIUM CHLORIDE 9 MG/ML
1000 INJECTION, SOLUTION INTRAVENOUS
Qty: 0 | Refills: 0 | Status: DISCONTINUED | OUTPATIENT
Start: 2019-02-20 | End: 2019-02-20

## 2019-02-20 RX ORDER — HEPARIN SODIUM 5000 [USP'U]/ML
5000 INJECTION INTRAVENOUS; SUBCUTANEOUS EVERY 8 HOURS
Qty: 0 | Refills: 0 | Status: DISCONTINUED | OUTPATIENT
Start: 2019-02-20 | End: 2019-02-21

## 2019-02-20 RX ORDER — FUROSEMIDE 40 MG
40 TABLET ORAL DAILY
Qty: 0 | Refills: 0 | Status: DISCONTINUED | OUTPATIENT
Start: 2019-02-20 | End: 2019-02-21

## 2019-02-20 RX ORDER — HYDRALAZINE HCL 50 MG
1 TABLET ORAL
Qty: 0 | Refills: 0 | COMMUNITY

## 2019-02-20 RX ORDER — NALOXONE HYDROCHLORIDE 4 MG/.1ML
0.1 SPRAY NASAL
Qty: 0 | Refills: 0 | Status: DISCONTINUED | OUTPATIENT
Start: 2019-02-20 | End: 2019-02-21

## 2019-02-20 RX ORDER — ALBUTEROL 90 UG/1
2 AEROSOL, METERED ORAL EVERY 6 HOURS
Qty: 0 | Refills: 0 | Status: DISCONTINUED | OUTPATIENT
Start: 2019-02-20 | End: 2019-02-21

## 2019-02-20 RX ORDER — SODIUM CHLORIDE 9 MG/ML
1000 INJECTION INTRAMUSCULAR; INTRAVENOUS; SUBCUTANEOUS
Qty: 0 | Refills: 0 | Status: DISCONTINUED | OUTPATIENT
Start: 2019-02-20 | End: 2019-02-21

## 2019-02-20 RX ORDER — ALBUTEROL 90 UG/1
2 AEROSOL, METERED ORAL
Qty: 0 | Refills: 0 | COMMUNITY

## 2019-02-20 RX ORDER — HYDRALAZINE HCL 50 MG
25 TABLET ORAL EVERY 8 HOURS
Qty: 0 | Refills: 0 | Status: DISCONTINUED | OUTPATIENT
Start: 2019-02-20 | End: 2019-02-21

## 2019-02-20 RX ORDER — CEFAZOLIN SODIUM 1 G
2000 VIAL (EA) INJECTION EVERY 8 HOURS
Qty: 0 | Refills: 0 | Status: COMPLETED | OUTPATIENT
Start: 2019-02-20 | End: 2019-02-21

## 2019-02-20 RX ORDER — INFLUENZA VIRUS VACCINE 15; 15; 15; 15 UG/.5ML; UG/.5ML; UG/.5ML; UG/.5ML
0.5 SUSPENSION INTRAMUSCULAR ONCE
Qty: 0 | Refills: 0 | Status: COMPLETED | OUTPATIENT
Start: 2019-02-20 | End: 2019-02-20

## 2019-02-20 RX ORDER — ONDANSETRON 8 MG/1
4 TABLET, FILM COATED ORAL EVERY 6 HOURS
Qty: 0 | Refills: 0 | Status: DISCONTINUED | OUTPATIENT
Start: 2019-02-20 | End: 2019-02-21

## 2019-02-20 RX ORDER — HYDROMORPHONE HYDROCHLORIDE 2 MG/ML
0.5 INJECTION INTRAMUSCULAR; INTRAVENOUS; SUBCUTANEOUS
Qty: 0 | Refills: 0 | Status: DISCONTINUED | OUTPATIENT
Start: 2019-02-20 | End: 2019-02-21

## 2019-02-20 RX ORDER — FENTANYL CITRATE 50 UG/ML
50 INJECTION INTRAVENOUS
Qty: 0 | Refills: 0 | Status: DISCONTINUED | OUTPATIENT
Start: 2019-02-20 | End: 2019-02-20

## 2019-02-20 RX ORDER — METOPROLOL TARTRATE 50 MG
100 TABLET ORAL
Qty: 0 | Refills: 0 | Status: DISCONTINUED | OUTPATIENT
Start: 2019-02-20 | End: 2019-02-21

## 2019-02-20 RX ADMIN — Medication 25 MILLIGRAM(S): at 21:31

## 2019-02-20 RX ADMIN — Medication 100 MILLIGRAM(S): at 22:14

## 2019-02-20 RX ADMIN — HEPARIN SODIUM 5000 UNIT(S): 5000 INJECTION INTRAVENOUS; SUBCUTANEOUS at 21:32

## 2019-02-20 RX ADMIN — LOSARTAN POTASSIUM 50 MILLIGRAM(S): 100 TABLET, FILM COATED ORAL at 21:31

## 2019-02-20 RX ADMIN — Medication 40 MILLIGRAM(S): at 21:32

## 2019-02-20 RX ADMIN — SODIUM CHLORIDE 125 MILLILITER(S): 9 INJECTION INTRAMUSCULAR; INTRAVENOUS; SUBCUTANEOUS at 16:20

## 2019-02-20 RX ADMIN — HYDROMORPHONE HYDROCHLORIDE 30 MILLILITER(S): 2 INJECTION INTRAMUSCULAR; INTRAVENOUS; SUBCUTANEOUS at 16:45

## 2019-02-20 RX ADMIN — Medication 100 MILLIGRAM(S): at 21:33

## 2019-02-20 RX ADMIN — ATORVASTATIN CALCIUM 10 MILLIGRAM(S): 80 TABLET, FILM COATED ORAL at 21:31

## 2019-02-20 RX ADMIN — SODIUM CHLORIDE 1000 MILLILITER(S): 9 INJECTION INTRAMUSCULAR; INTRAVENOUS; SUBCUTANEOUS at 16:55

## 2019-02-20 NOTE — BRIEF OPERATIVE NOTE - PROCEDURE
<<-----Click on this checkbox to enter Procedure Partial nephrectomy  02/20/2019  Robotically assisted right partial nephrectomy  Active  [id number]

## 2019-02-20 NOTE — PROGRESS NOTE ADULT - ASSESSMENT
Ass:  S/P Robotically assisted right partial nephrectomy  Plan:   Continue present management.  OOB ambulating, Incentive Spirometer

## 2019-02-20 NOTE — ASU PREOP CHECKLIST - ISOLATION PRECAUTIONS
Spoke with patient and states that she doesn't want to work with [facility] anymore.  States that they can't get anything right and have pissed her off the last time. She will call her insurance to see who else they will cover to get the oxygen from.     none

## 2019-02-20 NOTE — PROGRESS NOTE ADULT - SUBJECTIVE AND OBJECTIVE BOX
Post-op Check:  S/P Robotically assisted right partial nephrectomy    Patient is a 53y old Male with PMH of CKD, CHF, HTN, HLD, Asthma with Right Renal Mass, S/P Robotically assisted right partial nephrectomy.  Pt is sitting up in bed, without complaints this evening.    PAST MEDICAL & SURGICAL HISTORY:  CKD (chronic kidney disease)  Hyperlipidemia  Hypertension  CHF (congestive heart failure)  Asthma  H/O vasectomy: 22 years ago      MEDICATIONS  (STANDING):  amLODIPine   Tablet 5 milliGRAM(s) Oral daily  atorvastatin 10 milliGRAM(s) Oral at bedtime  ceFAZolin   IVPB 2000 milliGRAM(s) IV Intermittent every 8 hours  furosemide    Tablet 40 milliGRAM(s) Oral daily  heparin  Injectable 5000 Unit(s) SubCutaneous every 8 hours  hydrALAZINE 25 milliGRAM(s) Oral every 8 hours  HYDROmorphone PCA (1 mG/mL) 30 milliLiter(s) PCA Continuous PCA Continuous  influenza   Vaccine 0.5 milliLiter(s) IntraMuscular once  losartan 50 milliGRAM(s) Oral daily  metoprolol succinate  milliGRAM(s) Oral two times a day  sodium chloride 0.9%. 1000 milliLiter(s) (125 mL/Hr) IV Continuous <Continuous>      PHYSICAL EXAM:  T(C): 36.8 (02-20-19 @ 21:23), Max: 36.8 (02-20-19 @ 21:23)  HR: 77 (02-20-19 @ 21:23) (55 - 77)  BP: 146/76 (02-20-19 @ 21:23) (114/66 - 149/72)  RR: 18 (02-20-19 @ 21:23) (12 - 21)  SpO2: 95% (02-20-19 @ 21:23) (95% - 100%)    KERI:  bulb with 25cc dark blood.  functioning well, tubing milked  Urine Output:  500cc yellow urine in the phelps bag.  it had not been emptied since PACU    Skin:  warm and dry    Respiratory: Lungs clear and equal bilat, no r/r/w    Cardiovascular:  S1S2 reg, no M    Gastrointestinal:  Incision sites with dermabond, KERI in place, dressing clean and dry, 25cc in bulb    Extremities:  venodynes in place, no c/c/e    Vascular:  2+/4+ bilateral    Neurological:  A & O X3, CNII-XII grossly intact                            14.6   10.98 )-----------( 236      ( 20 Feb 2019 16:46 )             44.3       02-20    141  |  110<H>  |  22  ----------------------------<  111<H>  4.6   |  24  |  1.86<H>    Ca    9.0      20 Feb 2019 16:46

## 2019-02-21 ENCOUNTER — INBOUND DOCUMENT (OUTPATIENT)
Age: 54
End: 2019-02-21

## 2019-02-21 ENCOUNTER — TRANSCRIPTION ENCOUNTER (OUTPATIENT)
Age: 54
End: 2019-02-21

## 2019-02-21 VITALS — WEIGHT: 238.98 LBS

## 2019-02-21 LAB
ANION GAP SERPL CALC-SCNC: 11 MMOL/L — SIGNIFICANT CHANGE UP (ref 5–17)
BASOPHILS # BLD AUTO: 0.02 K/UL — SIGNIFICANT CHANGE UP (ref 0–0.2)
BASOPHILS NFR BLD AUTO: 0.1 % — SIGNIFICANT CHANGE UP (ref 0–2)
BUN SERPL-MCNC: 22 MG/DL — SIGNIFICANT CHANGE UP (ref 7–23)
CALCIUM SERPL-MCNC: 8.6 MG/DL — SIGNIFICANT CHANGE UP (ref 8.5–10.1)
CHLORIDE SERPL-SCNC: 108 MMOL/L — SIGNIFICANT CHANGE UP (ref 96–108)
CO2 SERPL-SCNC: 22 MMOL/L — SIGNIFICANT CHANGE UP (ref 22–31)
CREAT SERPL-MCNC: 1.66 MG/DL — HIGH (ref 0.5–1.3)
EOSINOPHIL # BLD AUTO: 0 K/UL — SIGNIFICANT CHANGE UP (ref 0–0.5)
EOSINOPHIL NFR BLD AUTO: 0 % — SIGNIFICANT CHANGE UP (ref 0–6)
GLUCOSE SERPL-MCNC: 128 MG/DL — HIGH (ref 70–99)
HCT VFR BLD CALC: 42 % — SIGNIFICANT CHANGE UP (ref 39–50)
HGB BLD-MCNC: 14 G/DL — SIGNIFICANT CHANGE UP (ref 13–17)
IMM GRANULOCYTES NFR BLD AUTO: 1 % — SIGNIFICANT CHANGE UP (ref 0–1.5)
LYMPHOCYTES # BLD AUTO: 1.02 K/UL — SIGNIFICANT CHANGE UP (ref 1–3.3)
LYMPHOCYTES # BLD AUTO: 5.9 % — LOW (ref 13–44)
MCHC RBC-ENTMCNC: 31.2 PG — SIGNIFICANT CHANGE UP (ref 27–34)
MCHC RBC-ENTMCNC: 33.3 GM/DL — SIGNIFICANT CHANGE UP (ref 32–36)
MCV RBC AUTO: 93.5 FL — SIGNIFICANT CHANGE UP (ref 80–100)
MONOCYTES # BLD AUTO: 1.05 K/UL — HIGH (ref 0–0.9)
MONOCYTES NFR BLD AUTO: 6 % — SIGNIFICANT CHANGE UP (ref 2–14)
NEUTROPHILS # BLD AUTO: 15.17 K/UL — HIGH (ref 1.8–7.4)
NEUTROPHILS NFR BLD AUTO: 87 % — HIGH (ref 43–77)
NRBC # BLD: 0 /100 WBCS — SIGNIFICANT CHANGE UP (ref 0–0)
PLATELET # BLD AUTO: 253 K/UL — SIGNIFICANT CHANGE UP (ref 150–400)
POTASSIUM SERPL-MCNC: 4.4 MMOL/L — SIGNIFICANT CHANGE UP (ref 3.5–5.3)
POTASSIUM SERPL-SCNC: 4.4 MMOL/L — SIGNIFICANT CHANGE UP (ref 3.5–5.3)
RBC # BLD: 4.49 M/UL — SIGNIFICANT CHANGE UP (ref 4.2–5.8)
RBC # FLD: 12.7 % — SIGNIFICANT CHANGE UP (ref 10.3–14.5)
SODIUM SERPL-SCNC: 141 MMOL/L — SIGNIFICANT CHANGE UP (ref 135–145)
WBC # BLD: 17.43 K/UL — HIGH (ref 3.8–10.5)
WBC # FLD AUTO: 17.43 K/UL — HIGH (ref 3.8–10.5)

## 2019-02-21 PROCEDURE — 99024 POSTOP FOLLOW-UP VISIT: CPT

## 2019-02-21 PROCEDURE — 99232 SBSQ HOSP IP/OBS MODERATE 35: CPT

## 2019-02-21 RX ORDER — OXYCODONE HYDROCHLORIDE 5 MG/1
5 TABLET ORAL EVERY 4 HOURS
Qty: 0 | Refills: 0 | Status: DISCONTINUED | OUTPATIENT
Start: 2019-02-21 | End: 2019-02-21

## 2019-02-21 RX ADMIN — AMLODIPINE BESYLATE 5 MILLIGRAM(S): 2.5 TABLET ORAL at 05:22

## 2019-02-21 RX ADMIN — SODIUM CHLORIDE 125 MILLILITER(S): 9 INJECTION INTRAMUSCULAR; INTRAVENOUS; SUBCUTANEOUS at 11:47

## 2019-02-21 RX ADMIN — LOSARTAN POTASSIUM 50 MILLIGRAM(S): 100 TABLET, FILM COATED ORAL at 05:23

## 2019-02-21 RX ADMIN — SODIUM CHLORIDE 125 MILLILITER(S): 9 INJECTION INTRAMUSCULAR; INTRAVENOUS; SUBCUTANEOUS at 02:38

## 2019-02-21 RX ADMIN — OXYCODONE HYDROCHLORIDE 5 MILLIGRAM(S): 5 TABLET ORAL at 11:30

## 2019-02-21 RX ADMIN — Medication 100 MILLIGRAM(S): at 05:23

## 2019-02-21 RX ADMIN — Medication 100 MILLIGRAM(S): at 05:28

## 2019-02-21 RX ADMIN — HEPARIN SODIUM 5000 UNIT(S): 5000 INJECTION INTRAVENOUS; SUBCUTANEOUS at 05:23

## 2019-02-21 RX ADMIN — Medication 25 MILLIGRAM(S): at 05:23

## 2019-02-21 RX ADMIN — Medication 40 MILLIGRAM(S): at 13:25

## 2019-02-21 NOTE — DISCHARGE NOTE ADULT - SECONDARY DIAGNOSIS.
Asthma CHF (congestive heart failure) H/O vasectomy CKD (chronic kidney disease) Hyperlipidemia Hypertension

## 2019-02-21 NOTE — ADVANCED PRACTICE NURSE CONSULT - REASON FOR CONSULT
Visited patient after meeting wife last week who was concerned about her husbands reaction to the new presumed  cancer diagnosis

## 2019-02-21 NOTE — ADVANCED PRACTICE NURSE CONSULT - ASSESSMENT
Patient is alert and oriented. He is walking frequently in the [place] and has minimal pain. Using PCA very infrequently. Observed using the incentive spirometry, does it well. Urine in phelps clear john. Eating a regular diet for breakfast. Has not passed flatus but has bowel sounds. Discussed his suspected diagnosis of renal cell cancer and follow up care with Dr. Esquivel. Anxious to return to work. Discussed getting the pathology report from Dr. Arteaga in about a week to 10 days. The patient's only question was how much of his kidney was removed, whmariluh  I referred him to the surgeon.

## 2019-02-21 NOTE — DISCHARGE NOTE ADULT - NS AS ACTIVITY OBS
Walking-Indoors allowed/Showering allowed/Stairs allowed/No Heavy lifting/straining/Do not drive or operate machinery/Walking-Outdoors allowed/return to work once cleared by doctor/Do not make important decisions

## 2019-02-21 NOTE — DISCHARGE NOTE ADULT - CARE PLAN
Principal Discharge DX:	Renal mass, right  Goal:	mass removal  Assessment and plan of treatment:	follow up with surgeon in 2 weeks  Secondary Diagnosis:	Asthma  Secondary Diagnosis:	CHF (congestive heart failure)  Secondary Diagnosis:	H/O vasectomy  Secondary Diagnosis:	CKD (chronic kidney disease)  Secondary Diagnosis:	Hyperlipidemia  Secondary Diagnosis:	Hypertension

## 2019-02-21 NOTE — CHART NOTE - NSCHARTNOTEFT_GEN_A_CORE
Pt seen and examined without complaints.  Tolerating clears and ambulating without difficulty.  Denies pain.  PT requesting to go home.      ICU Vital Signs Last 24 Hrs  T(C): 36.5 (21 Feb 2019 04:00), Max: 36.8 (20 Feb 2019 21:23)  T(F): 97.7 (21 Feb 2019 04:00), Max: 98.2 (20 Feb 2019 21:23)  HR: 79 (21 Feb 2019 04:00) (55 - 85)  BP: 139/76 (21 Feb 2019 04:00) (114/66 - 149/72)  BP(mean): 83 (20 Feb 2019 18:00) (76 - 91)  ABP: --  ABP(mean): --  RR: 18 (21 Feb 2019 04:00) (12 - 21)  SpO2: 93% (21 Feb 2019 04:00) (93% - 100%)                          14.0   17.43 )-----------( 253      ( 21 Feb 2019 05:51 )             42.0     02-21    141  |  108  |  22  ----------------------------<  128<H>  4.4   |  22  |  1.66<H>    Ca    8.6      21 Feb 2019 05:51        fazal: 45cc x 12 hours serous  uo: 2900cc x 12 hours clear yellow urine    PE: resting comfortably  Heart: s1,s2  lungs: clear  Abd: soft, pos bs, fazal in place, nontender, soft reducible umbilical hernia    A/P: s/p robotic partial nephrectomy pod 1  -advance diet  -remove phelps  -d/c pca  -possible discharge home later today  discussed with Dr. Esquivel

## 2019-02-21 NOTE — DISCHARGE NOTE ADULT - PATIENT PORTAL LINK FT
You can access the [facility] Patient Portal, offered by [facility], by registering with the following website: [email or web address]

## 2019-02-21 NOTE — DISCHARGE NOTE ADULT - MEDICATION SUMMARY - MEDICATIONS TO TAKE
I will START or STAY ON the medications listed below when I get home from the hospital:    aspirin 81 mg oral delayed release tablet  -- 1 tab(s) by mouth once a day  -- Indication: For antiplatelet    oxycodone-acetaminophen 5 mg-325 mg oral tablet  -- 1 tab(s) by mouth every 6 hours, As Needed -for severe pain MDD:4   -- Caution federal law prohibits the transfer of this drug to any person other  than the person for whom it was prescribed.  May cause drowsiness.  Alcohol may intensify this effect.  Use care when operating dangerous machinery.  This prescription cannot be refilled.  This product contains acetaminophen.  Do not use  with any other product containing acetaminophen to prevent possible liver damage.  Using more of this medication than prescribed may cause serious breathing problems.    -- Indication: For pain     losartan 50 mg oral tablet  -- 1 tab(s) by mouth once a day  -- Indication: For BP control     atorvastatin 10 mg oral tablet  -- 1 tab(s) by mouth once a day (at bedtime)  -- Indication: For cholesterol     metoprolol succinate 100 mg oral tablet, extended release  -- 1 tab(s) by mouth 2 times a day  -- Indication: For BP control     ProAir HFA 90 mcg/inh inhalation aerosol  -- 2 puff(s) inhaled 4 times a day, As Needed - for shortness of breath and/or wheezing  -- Indication: For pulmonary    Norvasc 5 mg oral tablet  -- 1 tab(s) by mouth 2 times a day   -- Indication: For BP control     furosemide 40 mg oral tablet  -- 1 tab(s) by mouth once a day  -- Indication: For water pill    hydrALAZINE 25 mg oral tablet  -- 1 tab(s) by mouth every 8 hours  -- Indication: For BP control

## 2019-02-21 NOTE — DISCHARGE NOTE ADULT - CARE PROVIDER_API CALL
Juan M Esquivel)  Urology  [address] [place] [address]  Phone: [phone]  Fax: [phone]  Follow Up Time: 2 weeks

## 2019-02-21 NOTE — ADVANCED PRACTICE NURSE CONSULT - RECOMMEDATIONS
Wife in attendance during our conversation and she had no questions but has contacted me in the past and knows should she have other concerns she can reach me

## 2019-02-24 NOTE — ASSESSMENT
[FreeTextEntry1] : We discussed the natural history of solid renal masses.   Options of partial nephrectomy (robotic vs laparoscopic vs open), surveillance and ablative treatment was discussed in detail.    We discussed the R/B/A and complications of all options.   Robotic partial nephrectomy was discussed in detail.   We discussed the possibility of open conversion or laparoscopic conversion.   We also discussed the possibility of radical nephrectomy given the critical location of this as well as worsening renal function.     Intraoperative hemorrhage vs delayed bleeding and urinary leakage was discussed in detail.   All questions were answered as well as instructions were given.   Renal scan will be scheduled and she will need medical clearance from her primary care doctor and cardiology clearance from his cardiologist\par consent signed\par he wants this next week as he is going on a sailing trip in May and wants to recover.\par

## 2019-02-24 NOTE — PHYSICAL EXAM
[General Appearance - Well Developed] : well developed [General Appearance - Well Nourished] : well nourished [Normal Appearance] : normal appearance [Well Groomed] : well groomed [General Appearance - In No Acute Distress] : no acute distress [Edema] : no peripheral edema [Respiration, Rhythm And Depth] : normal respiratory rhythm and effort [Exaggerated Use Of Accessory Muscles For Inspiration] : no accessory muscle use [Abdomen Soft] : soft [Abdomen Tenderness] : non-tender [Costovertebral Angle Tenderness] : no ~M costovertebral angle tenderness [Urethral Meatus] : meatus normal [Urinary Bladder Findings] : the bladder was normal on palpation [Scrotum] : the scrotum was normal [Testes Mass (___cm)] : there were no testicular masses [No Prostate Nodules] : no prostate nodules [Normal Station and Gait] : the gait and station were normal for the patient's age [] : no rash [No Focal Deficits] : no focal deficits [Oriented To Time, Place, And Person] : oriented to person, place, and time [Affect] : the affect was normal [Mood] : the mood was normal [Not Anxious] : not anxious [No Palpable Adenopathy] : no palpable adenopathy [FreeTextEntry1] : large umbilical hernia

## 2019-02-24 NOTE — HISTORY OF PRESENT ILLNESS
[FreeTextEntry1] : This patient was seen in the hospital when he was admitted with hematuria and blood per rectum.   CT scan revealed a ~2 cm anterior right renal mass close to hilum.    He had a colonoscopy that was negative except for hemorrhoids.   No other sign of metastatic disease.   he is here to schedule surgery based on our discussion in hospital where I was originally consulted.\par \par He has a history of non ischemic cardiomyopathy and CAD plus essential hypertension and CKD III, Creatinine 1.4, GFR is 57

## 2019-02-26 LAB — SURGICAL PATHOLOGY FINAL REPORT - CH: SIGNIFICANT CHANGE UP

## 2019-02-28 ENCOUNTER — TRANSCRIPTION ENCOUNTER (OUTPATIENT)
Age: 54
End: 2019-02-28

## 2019-02-28 DIAGNOSIS — C64.1 MALIGNANT NEOPLASM OF RIGHT KIDNEY, EXCEPT RENAL PELVIS: ICD-10-CM

## 2019-02-28 DIAGNOSIS — E78.5 HYPERLIPIDEMIA, UNSPECIFIED: ICD-10-CM

## 2019-02-28 DIAGNOSIS — I50.9 HEART FAILURE, UNSPECIFIED: ICD-10-CM

## 2019-02-28 DIAGNOSIS — N18.9 CHRONIC KIDNEY DISEASE, UNSPECIFIED: ICD-10-CM

## 2019-02-28 DIAGNOSIS — I13.0 HYPERTENSIVE HEART AND CHRONIC KIDNEY DISEASE WITH HEART FAILURE AND STAGE 1 THROUGH STAGE 4 CHRONIC KIDNEY DISEASE, OR UNSPECIFIED CHRONIC KIDNEY DISEASE: ICD-10-CM

## 2019-02-28 DIAGNOSIS — N28.89 OTHER SPECIFIED DISORDERS OF KIDNEY AND URETER: ICD-10-CM

## 2019-02-28 DIAGNOSIS — J45.909 UNSPECIFIED ASTHMA, UNCOMPLICATED: ICD-10-CM

## 2019-03-06 ENCOUNTER — APPOINTMENT (OUTPATIENT)
Dept: UROLOGY | Facility: CLINIC | Age: 54
End: 2019-03-06
Payer: COMMERCIAL

## 2019-03-06 VITALS — SYSTOLIC BLOOD PRESSURE: 135 MMHG | HEART RATE: 101 BPM | DIASTOLIC BLOOD PRESSURE: 82 MMHG

## 2019-03-06 PROCEDURE — 99024 POSTOP FOLLOW-UP VISIT: CPT

## 2019-03-07 LAB
ANION GAP SERPL CALC-SCNC: 12 MMOL/L
BASOPHILS # BLD AUTO: 0.12 K/UL
BASOPHILS NFR BLD AUTO: 1 %
BUN SERPL-MCNC: 25 MG/DL
CALCIUM SERPL-MCNC: 9.6 MG/DL
CHLORIDE SERPL-SCNC: 105 MMOL/L
CO2 SERPL-SCNC: 24 MMOL/L
CREAT SERPL-MCNC: 1.62 MG/DL
EOSINOPHIL # BLD AUTO: 0.29 K/UL
EOSINOPHIL NFR BLD AUTO: 2.3 %
GLUCOSE SERPL-MCNC: 94 MG/DL
HCT VFR BLD CALC: 44.1 %
HGB BLD-MCNC: 14.2 G/DL
IMM GRANULOCYTES NFR BLD AUTO: 1.3 %
LYMPHOCYTES # BLD AUTO: 3.24 K/UL
LYMPHOCYTES NFR BLD AUTO: 25.8 %
MAN DIFF?: NORMAL
MCHC RBC-ENTMCNC: 30.8 PG
MCHC RBC-ENTMCNC: 32.2 GM/DL
MCV RBC AUTO: 95.7 FL
MONOCYTES # BLD AUTO: 1.28 K/UL
MONOCYTES NFR BLD AUTO: 10.2 %
NEUTROPHILS # BLD AUTO: 7.45 K/UL
NEUTROPHILS NFR BLD AUTO: 59.4 %
PLATELET # BLD AUTO: 269 K/UL
POTASSIUM SERPL-SCNC: 4.8 MMOL/L
RBC # BLD: 4.61 M/UL
RBC # FLD: 12.6 %
SODIUM SERPL-SCNC: 141 MMOL/L
WBC # FLD AUTO: 12.54 K/UL

## 2019-03-20 NOTE — PHYSICAL EXAM
[General Appearance - Well Developed] : well developed [General Appearance - Well Nourished] : well nourished [Normal Appearance] : normal appearance [Well Groomed] : well groomed [General Appearance - In No Acute Distress] : no acute distress [Abdomen Soft] : soft [Abdomen Tenderness] : non-tender [Costovertebral Angle Tenderness] : no ~M costovertebral angle tenderness [Urethral Meatus] : meatus normal [Urinary Bladder Findings] : the bladder was normal on palpation [Scrotum] : the scrotum was normal [Testes Mass (___cm)] : there were no testicular masses [No Prostate Nodules] : no prostate nodules [Edema] : no peripheral edema [] : no respiratory distress [Respiration, Rhythm And Depth] : normal respiratory rhythm and effort [Exaggerated Use Of Accessory Muscles For Inspiration] : no accessory muscle use [Oriented To Time, Place, And Person] : oriented to person, place, and time [Affect] : the affect was normal [Mood] : the mood was normal [Not Anxious] : not anxious [Normal Station and Gait] : the gait and station were normal for the patient's age [No Focal Deficits] : no focal deficits [No Palpable Adenopathy] : no palpable adenopathy [FreeTextEntry1] : large umbilical hernia wounds healed well

## 2019-04-15 ENCOUNTER — RESULT REVIEW (OUTPATIENT)
Age: 54
End: 2019-04-15

## 2019-04-15 ENCOUNTER — RX RENEWAL (OUTPATIENT)
Age: 54
End: 2019-04-15

## 2019-04-15 ENCOUNTER — APPOINTMENT (OUTPATIENT)
Dept: INTERNAL MEDICINE | Facility: CLINIC | Age: 54
End: 2019-04-15
Payer: COMMERCIAL

## 2019-04-15 ENCOUNTER — NON-APPOINTMENT (OUTPATIENT)
Age: 54
End: 2019-04-15

## 2019-04-15 VITALS
OXYGEN SATURATION: 94 % | BODY MASS INDEX: 33.53 KG/M2 | RESPIRATION RATE: 22 BRPM | HEART RATE: 68 BPM | SYSTOLIC BLOOD PRESSURE: 134 MMHG | HEIGHT: 73 IN | DIASTOLIC BLOOD PRESSURE: 94 MMHG | WEIGHT: 253 LBS | TEMPERATURE: 98.6 F

## 2019-04-15 PROCEDURE — 94060 EVALUATION OF WHEEZING: CPT

## 2019-04-15 PROCEDURE — 99213 OFFICE O/P EST LOW 20 MIN: CPT | Mod: 25

## 2019-04-15 NOTE — PHYSICAL EXAM
[No Acute Distress] : no acute distress [Well Nourished] : well nourished [Well Developed] : well developed [Normal Sclera/Conjunctiva] : normal sclera/conjunctiva [PERRL] : pupils equal round and reactive to light [Normal TMs] : both tympanic membranes were normal [Supple] : supple [No Respiratory Distress] : no respiratory distress  [Clear to Auscultation] : lungs were clear to auscultation bilaterally [Normal Rate] : normal rate  [Regular Rhythm] : with a regular rhythm [Normal Posterior Cervical Nodes] : no posterior cervical lymphadenopathy [No Edema] : there was no peripheral edema [Normal Anterior Cervical Nodes] : no anterior cervical lymphadenopathy [Grossly Normal Strength/Tone] : grossly normal strength/tone [No Rash] : no rash [No Focal Deficits] : no focal deficits [Normal Affect] : the affect was normal [Normal Insight/Judgement] : insight and judgment were intact [de-identified] : post bronchodilator. [de-identified] : Oropharynx erythematous, mildly edematous, without exudates. [de-identified] : well healing 1 inch scars on abdomen consistent with recent laparoscopic surgery.

## 2019-04-15 NOTE — HISTORY OF PRESENT ILLNESS
[FreeTextEntry8] : Patient complains of cough, nasal congestion and headache.  Started last week as mild sore throat and congestion, symptom progression.  Some wheeze, has been using Albuterol as rescue with relief.  Mucus is green, cough kept him awake the past 2 nights.\par Intermittent low grade fever, 100.8 yesterday, headache.\par Has a history of asthma but until now has not used any of his pulmonary medications, reports asthma has been in control without.

## 2019-04-15 NOTE — REVIEW OF SYSTEMS
[Fever] : fever [Fatigue] : fatigue [Chills] : chills [Earache] : no earache [Nasal Discharge] : nasal discharge [Sore Throat] : sore throat [Shortness Of Breath] : no shortness of breath [Headache] : headache [Cough] : cough [Wheezing] : wheezing [Negative] : Psychiatric

## 2019-04-15 NOTE — PLAN
[FreeTextEntry1] : Use Albuterol MDI as needed for wheeze or dyspnea.\par Remain hydrated.\par Call office if no resolve.

## 2019-04-30 ENCOUNTER — NON-APPOINTMENT (OUTPATIENT)
Age: 54
End: 2019-04-30

## 2019-04-30 ENCOUNTER — APPOINTMENT (OUTPATIENT)
Dept: CARDIOLOGY | Facility: CLINIC | Age: 54
End: 2019-04-30
Payer: COMMERCIAL

## 2019-04-30 VITALS
OXYGEN SATURATION: 96 % | BODY MASS INDEX: 34.59 KG/M2 | WEIGHT: 261 LBS | DIASTOLIC BLOOD PRESSURE: 90 MMHG | HEART RATE: 59 BPM | SYSTOLIC BLOOD PRESSURE: 144 MMHG | HEIGHT: 73 IN | RESPIRATION RATE: 18 BRPM

## 2019-04-30 DIAGNOSIS — Z85.528 PERSONAL HISTORY OF OTHER MALIGNANT NEOPLASM OF KIDNEY: ICD-10-CM

## 2019-04-30 DIAGNOSIS — J45.909 UNSPECIFIED ASTHMA, UNCOMPLICATED: ICD-10-CM

## 2019-04-30 PROCEDURE — 99215 OFFICE O/P EST HI 40 MIN: CPT | Mod: 25

## 2019-04-30 PROCEDURE — 93000 ELECTROCARDIOGRAM COMPLETE: CPT

## 2019-04-30 RX ORDER — AMOXICILLIN 500 MG/1
500 CAPSULE ORAL TWICE DAILY
Qty: 20 | Refills: 0 | Status: DISCONTINUED | COMMUNITY
Start: 2019-04-15 | End: 2019-04-30

## 2019-05-01 PROBLEM — J45.909 ASTHMA, MODERATE: Status: RESOLVED | Noted: 2018-07-23 | Resolved: 2019-05-01

## 2019-05-01 PROBLEM — Z85.528 HISTORY OF MALIGNANT NEOPLASM OF KIDNEY: Status: RESOLVED | Noted: 2019-02-24 | Resolved: 2019-05-01

## 2019-05-01 PROBLEM — J45.909 ACUTE ASTHMATIC BRONCHITIS: Status: RESOLVED | Noted: 2019-04-15 | Resolved: 2019-05-01

## 2019-05-01 NOTE — PHYSICAL EXAM
[General Appearance - Well Developed] : well developed [Normal Appearance] : normal appearance [General Appearance - Well Nourished] : well nourished [Normal Conjunctiva] : the conjunctiva exhibited no abnormalities [Eyelids - No Xanthelasma] : the eyelids demonstrated no xanthelasmas [Normal Oropharynx] : normal oropharynx [No Oral Pallor] : no oral pallor [Normal Jugular Venous A Waves Present] : normal jugular venous A waves present [Normal Jugular Venous V Waves Present] : normal jugular venous V waves present [Respiration, Rhythm And Depth] : normal respiratory rhythm and effort [Auscultation Breath Sounds / Voice Sounds] : lungs were clear to auscultation bilaterally [Bowel Sounds] : normal bowel sounds [Abdomen Soft] : soft [Abnormal Walk] : normal gait [Gait - Sufficient For Exercise Testing] : the gait was sufficient for exercise testing [Nail Clubbing] : no clubbing of the fingernails [Cyanosis, Localized] : no localized cyanosis [Petechial Hemorrhages (___cm)] : no petechial hemorrhages [] : no rash [Oriented To Time, Place, And Person] : oriented to person, place, and time [Affect] : the affect was normal [Ant Axillary Line] : palpated in the anterior axillary line [Diffuse] : diffuse [No Precordial Heave] : no precordial heave was noted [Normal Rate] : normal [Rhythm Regular] : regular [Normal S2] : normal S2 [Normal S1] : normal S1 [S3] : no S3 [I] : a grade 1 [Right Carotid Bruit] : no bruit heard over the right carotid [Left Carotid Bruit] : no bruit heard over the left carotid [2+] : right 2+ [No Pitting Edema] : no pitting edema present

## 2019-05-01 NOTE — HISTORY OF PRESENT ILLNESS
[FreeTextEntry1] : Had gross hematuria recently and was diagnosed with clear cell CA of kidney and underwent partial nephrectomy.  Has now recovered and is doing better.  Denies chest pain, dizziness or lightheadedness. BP running slightly since events.  Weight stable and no edema in LE. Denies dyspnea or orthopnea.\par

## 2019-05-01 NOTE — DISCUSSION/SUMMARY
[FreeTextEntry1] : HTN and Nonischemic cardiomyopathy: Stable with good response to medical therapy.  BP high since nephrectomy.  BP log at home and then will decide about medication adjustment. Low Sodium Diet. \par CAD: continue current therapy. Low Fat diet and Low sodium diet discussed.\par Hyperlipidemia:.Blood work prior to follow up.  Low fat diet, exercise and weight loss discussed.\par Diabetes: Blood work prior to follow up. Diet and  weight loss discussed.\par Follow up in 2 months.

## 2019-05-01 NOTE — REASON FOR VISIT
[Follow-Up - Clinic] : a clinic follow-up of [Cardiomyopathy] : cardiomyopathy [Coronary Artery Disease] : coronary artery disease [Heart Failure] : congestive heart failure [Hypertension] : hypertension

## 2019-05-23 ENCOUNTER — RX RENEWAL (OUTPATIENT)
Age: 54
End: 2019-05-23

## 2019-05-23 ENCOUNTER — MEDICATION RENEWAL (OUTPATIENT)
Age: 54
End: 2019-05-23

## 2019-07-17 ENCOUNTER — APPOINTMENT (OUTPATIENT)
Age: 54
End: 2019-07-17

## 2019-07-20 ENCOUNTER — LABORATORY RESULT (OUTPATIENT)
Age: 54
End: 2019-07-20

## 2019-07-22 ENCOUNTER — RX RENEWAL (OUTPATIENT)
Age: 54
End: 2019-07-22

## 2019-07-23 ENCOUNTER — RX RENEWAL (OUTPATIENT)
Age: 54
End: 2019-07-23

## 2019-07-23 ENCOUNTER — APPOINTMENT (OUTPATIENT)
Dept: CARDIOLOGY | Facility: CLINIC | Age: 54
End: 2019-07-23
Payer: COMMERCIAL

## 2019-07-23 ENCOUNTER — MEDICATION RENEWAL (OUTPATIENT)
Age: 54
End: 2019-07-23

## 2019-07-23 VITALS
RESPIRATION RATE: 14 BRPM | BODY MASS INDEX: 34.85 KG/M2 | DIASTOLIC BLOOD PRESSURE: 86 MMHG | TEMPERATURE: 97.7 F | HEIGHT: 73 IN | HEART RATE: 62 BPM | WEIGHT: 263 LBS | SYSTOLIC BLOOD PRESSURE: 159 MMHG | OXYGEN SATURATION: 96 %

## 2019-07-23 DIAGNOSIS — Z85.528 PERSONAL HISTORY OF OTHER MALIGNANT NEOPLASM OF KIDNEY: ICD-10-CM

## 2019-07-23 PROCEDURE — 99214 OFFICE O/P EST MOD 30 MIN: CPT | Mod: 25

## 2019-07-23 PROCEDURE — 93000 ELECTROCARDIOGRAM COMPLETE: CPT

## 2019-07-23 NOTE — REASON FOR VISIT
[Follow-Up - Clinic] : a clinic follow-up of [Coronary Artery Disease] : coronary artery disease [Cardiomyopathy] : cardiomyopathy [Heart Failure] : congestive heart failure [Hypertension] : hypertension

## 2019-07-31 ENCOUNTER — APPOINTMENT (OUTPATIENT)
Dept: UROLOGY | Facility: CLINIC | Age: 54
End: 2019-07-31
Payer: COMMERCIAL

## 2019-07-31 VITALS
HEART RATE: 107 BPM | HEIGHT: 73 IN | WEIGHT: 261.6 LBS | BODY MASS INDEX: 34.67 KG/M2 | DIASTOLIC BLOOD PRESSURE: 85 MMHG | SYSTOLIC BLOOD PRESSURE: 151 MMHG

## 2019-07-31 PROCEDURE — 99213 OFFICE O/P EST LOW 20 MIN: CPT

## 2019-07-31 NOTE — HISTORY OF PRESENT ILLNESS
[FreeTextEntry1] : Patient presents in follow up from Right partial nephrectomy 02/21/2019.  He is doing well with no complaints at this time. 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

## 2019-07-31 NOTE — PHYSICAL EXAM
[General Appearance - Well Nourished] : well nourished [General Appearance - Well Developed] : well developed [Normal Appearance] : normal appearance [Well Groomed] : well groomed [General Appearance - In No Acute Distress] : no acute distress [Abdomen Soft] : soft [Costovertebral Angle Tenderness] : no ~M costovertebral angle tenderness [Abdomen Tenderness] : non-tender [Urinary Bladder Findings] : the bladder was normal on palpation [Edema] : no peripheral edema [] : no respiratory distress [Respiration, Rhythm And Depth] : normal respiratory rhythm and effort [Exaggerated Use Of Accessory Muscles For Inspiration] : no accessory muscle use [Oriented To Time, Place, And Person] : oriented to person, place, and time [Affect] : the affect was normal [Not Anxious] : not anxious [Mood] : the mood was normal [Normal Station and Gait] : the gait and station were normal for the patient's age [No Focal Deficits] : no focal deficits [No Palpable Adenopathy] : no palpable adenopathy [FreeTextEntry1] : large umbilical hernia wounds healed well

## 2019-07-31 NOTE — ASSESSMENT
[FreeTextEntry1] : Partial nephrectomy 02/21/2019 and doing well. \par will repeat CT Abdomen and Mag 3 Scan at one year post op. \par Follow up after imaging studies have been obtained

## 2019-08-04 PROBLEM — Z85.528: Status: RESOLVED | Noted: 2019-05-01 | Resolved: 2019-08-04

## 2019-08-04 NOTE — HISTORY OF PRESENT ILLNESS
[FreeTextEntry1] : Doing well. Denies chest pain, shortness of breath, dyspnea on exertion, palpitations, orthopnea, paroxysmal nocturnal dyspnea, claudication, dizziness,  lightheadedness, presyncopal, or syncopal symptoms.BP running high normal and discussed him loosing sone of the weight he gained to try and improve this. Reviewed labs and except for TG all WNL for him. \par \par

## 2019-08-04 NOTE — PHYSICAL EXAM
[General Appearance - Well Developed] : well developed [Normal Appearance] : normal appearance [General Appearance - Well Nourished] : well nourished [Normal Conjunctiva] : the conjunctiva exhibited no abnormalities [Eyelids - No Xanthelasma] : the eyelids demonstrated no xanthelasmas [Normal Oropharynx] : normal oropharynx [No Oral Pallor] : no oral pallor [Normal Jugular Venous A Waves Present] : normal jugular venous A waves present [Normal Jugular Venous V Waves Present] : normal jugular venous V waves present [Bowel Sounds] : normal bowel sounds [Respiration, Rhythm And Depth] : normal respiratory rhythm and effort [Auscultation Breath Sounds / Voice Sounds] : lungs were clear to auscultation bilaterally [Abnormal Walk] : normal gait [Abdomen Soft] : soft [Gait - Sufficient For Exercise Testing] : the gait was sufficient for exercise testing [Cyanosis, Localized] : no localized cyanosis [Nail Clubbing] : no clubbing of the fingernails [Oriented To Time, Place, And Person] : oriented to person, place, and time [Ant Axillary Line] : palpated in the anterior axillary line [Diffuse] : diffuse [Affect] : the affect was normal [Normal Rate] : normal [No Precordial Heave] : no precordial heave was noted [Normal S2] : normal S2 [Normal S1] : normal S1 [Rhythm Regular] : regular [I] : a grade 1 [2+] : left 2+ [No Pitting Edema] : no pitting edema present [] : no respiratory distress [Right Carotid Bruit] : no bruit heard over the right carotid [S3] : no S3 [Left Carotid Bruit] : no bruit heard over the left carotid

## 2019-08-04 NOTE — DISCUSSION/SUMMARY
[FreeTextEntry1] : HTN and Nonischemic cardiomyopathy: Stable with good response to medical therapy.  BP improving slowly since nephrectomy.  BP log at home. Low Sodium Diet, exercise and weight loss. \par CAD: continue current therapy. Low Fat diet and Low sodium diet discussed.\par Hyperlipidemia: Recent blood work with elevate TG. Blood work prior to follow up.  Low fat diet, exercise and weight loss discussed.\par Diabetes: Controlled. Diet and  weight loss discussed.\par Follow up in 4 months.

## 2019-08-19 ENCOUNTER — MEDICATION RENEWAL (OUTPATIENT)
Age: 54
End: 2019-08-19

## 2019-11-16 ENCOUNTER — LABORATORY RESULT (OUTPATIENT)
Age: 54
End: 2019-11-16

## 2019-11-25 ENCOUNTER — MEDICATION RENEWAL (OUTPATIENT)
Age: 54
End: 2019-11-25

## 2019-11-25 ENCOUNTER — RX RENEWAL (OUTPATIENT)
Age: 54
End: 2019-11-25

## 2019-12-05 ENCOUNTER — MEDICATION RENEWAL (OUTPATIENT)
Age: 54
End: 2019-12-05

## 2019-12-05 ENCOUNTER — RX RENEWAL (OUTPATIENT)
Age: 54
End: 2019-12-05

## 2019-12-10 ENCOUNTER — NON-APPOINTMENT (OUTPATIENT)
Age: 54
End: 2019-12-10

## 2019-12-10 ENCOUNTER — APPOINTMENT (OUTPATIENT)
Dept: CARDIOLOGY | Facility: CLINIC | Age: 54
End: 2019-12-10
Payer: COMMERCIAL

## 2019-12-10 VITALS
SYSTOLIC BLOOD PRESSURE: 130 MMHG | HEIGHT: 73 IN | OXYGEN SATURATION: 95 % | DIASTOLIC BLOOD PRESSURE: 80 MMHG | HEART RATE: 66 BPM | RESPIRATION RATE: 14 BRPM | BODY MASS INDEX: 32.87 KG/M2 | WEIGHT: 248 LBS

## 2019-12-10 PROCEDURE — 93000 ELECTROCARDIOGRAM COMPLETE: CPT

## 2019-12-10 PROCEDURE — 99215 OFFICE O/P EST HI 40 MIN: CPT

## 2019-12-11 NOTE — DISCUSSION/SUMMARY
[FreeTextEntry1] : HTN and Nonischemic cardiomyopathy: Stable with good response to medical therapy.  BP Controlled. Low sodium diet, exercise and weight loss discussed.\par Non-Obstructive CAD: continue current therapy. Low Fat diet and Low sodium diet discussed.\par Hyperlipidemia: Recent blood work with elevate TG. Low fat diet, exercise and weight loss discussed. If not better, Will discuss options for medical treatment (Vascepa).\par Diabetes: Controlled. Diet and  weight loss discussed.\par Follow up in 4 months.

## 2019-12-11 NOTE — REASON FOR VISIT
[Follow-Up - Clinic] : a clinic follow-up of [Cardiomyopathy] : cardiomyopathy [Coronary Artery Disease] : coronary artery disease [Hypertension] : hypertension [Heart Failure] : congestive heart failure

## 2019-12-11 NOTE — HISTORY OF PRESENT ILLNESS
[FreeTextEntry1] : Doing well. Denies chest pain, shortness of breath, dyspnea on exertion, palpitations, orthopnea, paroxysmal nocturnal dyspnea, claudication, dizziness,  lightheadedness, presyncopal, or syncopal symptoms.BP better and controlled. Reviewed labs and except for TG all WNL for him. \par \par

## 2019-12-11 NOTE — PHYSICAL EXAM
[Normal Appearance] : normal appearance [General Appearance - Well Developed] : well developed [Eyelids - No Xanthelasma] : the eyelids demonstrated no xanthelasmas [General Appearance - Well Nourished] : well nourished [Normal Conjunctiva] : the conjunctiva exhibited no abnormalities [Normal Oropharynx] : normal oropharynx [No Oral Pallor] : no oral pallor [Normal Jugular Venous V Waves Present] : normal jugular venous V waves present [Normal Jugular Venous A Waves Present] : normal jugular venous A waves present [Respiration, Rhythm And Depth] : normal respiratory rhythm and effort [Auscultation Breath Sounds / Voice Sounds] : lungs were clear to auscultation bilaterally [Abdomen Soft] : soft [Bowel Sounds] : normal bowel sounds [Abnormal Walk] : normal gait [Gait - Sufficient For Exercise Testing] : the gait was sufficient for exercise testing [Nail Clubbing] : no clubbing of the fingernails [Oriented To Time, Place, And Person] : oriented to person, place, and time [] : no rash [Cyanosis, Localized] : no localized cyanosis [Diffuse] : diffuse [Affect] : the affect was normal [Ant Axillary Line] : palpated in the anterior axillary line [No Precordial Heave] : no precordial heave was noted [Rhythm Regular] : regular [Normal Rate] : normal [Normal S1] : normal S1 [Normal S2] : normal S2 [S3] : no S3 [I] : a grade 1 [Right Carotid Bruit] : no bruit heard over the right carotid [Left Carotid Bruit] : no bruit heard over the left carotid [2+] : left 2+ [No Pitting Edema] : no pitting edema present

## 2020-01-06 ENCOUNTER — RX RENEWAL (OUTPATIENT)
Age: 55
End: 2020-01-06

## 2020-01-28 ENCOUNTER — MOBILE ON CALL (OUTPATIENT)
Age: 55
End: 2020-01-28

## 2020-02-07 ENCOUNTER — FORM ENCOUNTER (OUTPATIENT)
Age: 55
End: 2020-02-07

## 2020-02-08 ENCOUNTER — APPOINTMENT (OUTPATIENT)
Dept: MRI IMAGING | Facility: CLINIC | Age: 55
End: 2020-02-08
Payer: COMMERCIAL

## 2020-02-08 ENCOUNTER — OUTPATIENT (OUTPATIENT)
Dept: OUTPATIENT SERVICES | Facility: HOSPITAL | Age: 55
LOS: 1 days | End: 2020-02-08
Payer: COMMERCIAL

## 2020-02-08 DIAGNOSIS — Z00.8 ENCOUNTER FOR OTHER GENERAL EXAMINATION: ICD-10-CM

## 2020-02-08 DIAGNOSIS — Z98.52 VASECTOMY STATUS: Chronic | ICD-10-CM

## 2020-02-08 PROCEDURE — 74183 MRI ABD W/O CNTR FLWD CNTR: CPT

## 2020-02-08 PROCEDURE — 74183 MRI ABD W/O CNTR FLWD CNTR: CPT | Mod: 26

## 2020-02-08 PROCEDURE — A9585: CPT

## 2020-02-19 ENCOUNTER — APPOINTMENT (OUTPATIENT)
Age: 55
End: 2020-02-19
Payer: COMMERCIAL

## 2020-02-19 PROCEDURE — 99213 OFFICE O/P EST LOW 20 MIN: CPT

## 2020-02-19 NOTE — PHYSICAL EXAM
[Normal Appearance] : normal appearance [General Appearance - In No Acute Distress] : no acute distress [Abdomen Soft] : soft [Urethral Meatus] : meatus normal [Edema] : no peripheral edema [Skin Color & Pigmentation] : normal skin color and pigmentation [] : no respiratory distress [Oriented To Time, Place, And Person] : oriented to person, place, and time [Affect] : the affect was normal [Normal Station and Gait] : the gait and station were normal for the patient's age [No Palpable Adenopathy] : no palpable adenopathy

## 2020-02-22 NOTE — ASSESSMENT
[FreeTextEntry1] : Pt. doing well. Discussed findings of MRI with patient. Surveillance for cysts recommended.\par Repeat MRI in 1 year.\par Followup in 1 year.

## 2020-02-22 NOTE — HISTORY OF PRESENT ILLNESS
[FreeTextEntry1] : 53 yo presents today for renal cell carcinoma s/p right robotic partial nephrectomy (2/20/19) for a 2.7 cm RCC. Pt. reports feeling well. Recent MRI 2/8/2020 indicated no recurrence of RCC and bilateral subcentimeter renal cysts. Renal scan (5/23/2019) showed left 45% and right 55% function differential. Last BUN 17/ creatinine 1.54. No acute issues at this time.

## 2020-03-09 ENCOUNTER — TRANSCRIPTION ENCOUNTER (OUTPATIENT)
Age: 55
End: 2020-03-09

## 2020-03-11 ENCOUNTER — TRANSCRIPTION ENCOUNTER (OUTPATIENT)
Age: 55
End: 2020-03-11

## 2020-04-21 ENCOUNTER — APPOINTMENT (OUTPATIENT)
Dept: CARDIOLOGY | Facility: CLINIC | Age: 55
End: 2020-04-21

## 2020-05-05 ENCOUNTER — APPOINTMENT (OUTPATIENT)
Dept: CARDIOLOGY | Facility: CLINIC | Age: 55
End: 2020-05-05

## 2020-05-05 ENCOUNTER — APPOINTMENT (OUTPATIENT)
Dept: CARDIOLOGY | Facility: CLINIC | Age: 55
End: 2020-05-05
Payer: COMMERCIAL

## 2020-05-05 VITALS — BODY MASS INDEX: 31.28 KG/M2 | HEIGHT: 73 IN | WEIGHT: 236 LBS

## 2020-05-05 VITALS — DIASTOLIC BLOOD PRESSURE: 70 MMHG | SYSTOLIC BLOOD PRESSURE: 118 MMHG

## 2020-05-05 DIAGNOSIS — N28.89 OTHER SPECIFIED DISORDERS OF KIDNEY AND URETER: ICD-10-CM

## 2020-05-05 DIAGNOSIS — Z85.528 PERSONAL HISTORY OF OTHER MALIGNANT NEOPLASM OF KIDNEY: ICD-10-CM

## 2020-05-05 DIAGNOSIS — Z86.79 PERSONAL HISTORY OF OTHER DISEASES OF THE CIRCULATORY SYSTEM: ICD-10-CM

## 2020-05-05 PROCEDURE — 99214 OFFICE O/P EST MOD 30 MIN: CPT | Mod: 95

## 2020-05-05 RX ORDER — GUAIFENESIN AND CODEINE PHOSPHATE 100; 10 MG/5ML; MG/5ML
100-10 SYRUP ORAL
Qty: 240 | Refills: 0 | Status: DISCONTINUED | COMMUNITY
Start: 2019-04-15 | End: 2020-05-05

## 2020-05-05 NOTE — HISTORY OF PRESENT ILLNESS
[Home] : at home, [unfilled] , at the time of the visit. [Medical Office: ([facility])___] : at the medical office located in  [Patient] : the patient [Self] : self [FreeTextEntry1] : Tele Health started 9:58 AM. \par \par Feels well overall. No issues, exerting himself. Did have some issues with lightheadedness a few weeks back and his Norvasc was adjusted downward by the nurse practitioner. After doing so. His blood pressure one and he has since readjusted back to normal, but still occasionally gets dizziness, especially an orthostatic type symptoms. He reports that his blood pressure occasionally when this occurs is on the low side in the 100-110 range. Denies shortness of breath, palpitations, syncope, or lower extremity edema. [FreeTextEntry2] : Ifeanyi Hairston [FreeTextEntry4] : Mechelle Gao MA

## 2020-05-05 NOTE — DISCUSSION/SUMMARY
[FreeTextEntry1] : HTN and Nonischemic cardiomyopathy: Medication adjustment made due to his BP running low and symptoms of dizziness/lightheadedness and weight loss.  Hydralazine decreased . Low sodium diet, exercise and weight loss discussed.\par Non-Obstructive CAD: continue current therapy. Low Fat diet and Low sodium diet discussed.\par Hyperlipidemia: blood work prior to follow up. Low fat diet, exercise and weight loss discussed. \par Diabetes: Controlled. Diet and  weight loss discussed.\par Follow up in 2 months.

## 2020-05-05 NOTE — PHYSICAL EXAM
[Normal Appearance] : normal appearance [Eyelids - No Xanthelasma] : the eyelids demonstrated no xanthelasmas [Abnormal Walk] : normal gait [Gait - Sufficient For Exercise Testing] : the gait was sufficient for exercise testing [] : no rash [Affect] : the affect was normal [Ant Axillary Line] : palpated in the anterior axillary line [Diffuse] : diffuse [No Precordial Heave] : no precordial heave was noted [Normal Rate] : normal [Rhythm Regular] : regular [Normal S2] : normal S2 [Normal S1] : normal S1 [I] : a grade 1 [2+] : left 2+ [No Pitting Edema] : no pitting edema present [S3] : no S3 [Right Carotid Bruit] : no bruit heard over the right carotid [Left Carotid Bruit] : no bruit heard over the left carotid [No Oral Pallor] : no oral pallor [No Anxiety] : not feeling anxious [Oriented To Time, Place, And Person] : oriented to person, place, and time

## 2020-05-29 ENCOUNTER — RX RENEWAL (OUTPATIENT)
Age: 55
End: 2020-05-29

## 2020-06-10 ENCOUNTER — RX RENEWAL (OUTPATIENT)
Age: 55
End: 2020-06-10

## 2020-07-13 ENCOUNTER — RX RENEWAL (OUTPATIENT)
Age: 55
End: 2020-07-13

## 2020-07-14 ENCOUNTER — APPOINTMENT (OUTPATIENT)
Dept: CARDIOLOGY | Facility: CLINIC | Age: 55
End: 2020-07-14
Payer: COMMERCIAL

## 2020-07-14 ENCOUNTER — TRANSCRIPTION ENCOUNTER (OUTPATIENT)
Age: 55
End: 2020-07-14

## 2020-07-14 VITALS — BODY MASS INDEX: 31.53 KG/M2 | SYSTOLIC BLOOD PRESSURE: 125 MMHG | WEIGHT: 239 LBS | DIASTOLIC BLOOD PRESSURE: 76 MMHG

## 2020-07-14 PROCEDURE — 99214 OFFICE O/P EST MOD 30 MIN: CPT | Mod: 95

## 2020-07-14 NOTE — REASON FOR VISIT
[Home] : at home, [unfilled] , at the time of the visit. [Medical Office: ([facility])___] : at the medical office located in  [Follow-Up - Clinic] : a clinic follow-up of [Cardiomyopathy] : cardiomyopathy [Coronary Artery Disease] : coronary artery disease [Heart Failure] : congestive heart failure [Hypertension] : hypertension [FreeTextEntry1] : 2:40 PM [FreeTextEntry3] : genaro deras [FreeTextEntry4] : carol ross ma

## 2020-07-14 NOTE — HISTORY OF PRESENT ILLNESS
[FreeTextEntry1] : Feels well overall. No issues, exerting himself. Lightheadedness much better since change and lowering of medication dosing.  BP perfect 125/76. Minimal orthostasis.  Reviewed labs and results are great.  Denies chest pain, shortness of breath, dyspnea on exertion, palpitations, orthopnea, paroxysmal nocturnal dyspnea, or claudication.\par

## 2020-07-14 NOTE — DISCUSSION/SUMMARY
[FreeTextEntry1] : HTN and Nonischemic cardiomyopathy: Medication adjustment made previously and results are good.. Low sodium diet, exercise and weight loss discussed.\par Non-Obstructive CAD: continue current therapy. Low Fat diet and Low sodium diet discussed.\par Hyperlipidemia: TG slightly high. Low fat diet, exercise and weight loss discussed. \par Diabetes: Controlled. Diet and  weight loss discussed.\par Renal function virtually normal on current labs.\par Follow up in 6 months.

## 2020-07-15 ENCOUNTER — APPOINTMENT (OUTPATIENT)
Dept: INTERNAL MEDICINE | Facility: CLINIC | Age: 55
End: 2020-07-15
Payer: COMMERCIAL

## 2020-07-15 PROCEDURE — 99442: CPT

## 2020-07-17 LAB
25(OH)D3 SERPL-MCNC: 38.6 NG/ML
ALBUMIN SERPL ELPH-MCNC: 4.6 G/DL
ALP BLD-CCNC: 61 U/L
ALT SERPL-CCNC: 32 U/L
ANION GAP SERPL CALC-SCNC: 15 MMOL/L
AST SERPL-CCNC: 23 U/L
BASOPHILS # BLD AUTO: 0.11 K/UL
BASOPHILS NFR BLD AUTO: 1.1 %
BILIRUB SERPL-MCNC: 0.3 MG/DL
BUN SERPL-MCNC: 16 MG/DL
CALCIUM SERPL-MCNC: 9.5 MG/DL
CHLORIDE SERPL-SCNC: 104 MMOL/L
CHOLEST SERPL-MCNC: 103 MG/DL
CHOLEST/HDLC SERPL: 3.9 RATIO
CK SERPL-CCNC: 113 U/L
CO2 SERPL-SCNC: 21 MMOL/L
CREAT SERPL-MCNC: 1.3 MG/DL
EOSINOPHIL # BLD AUTO: 0.42 K/UL
EOSINOPHIL NFR BLD AUTO: 4 %
GLUCOSE SERPL-MCNC: 107 MG/DL
HCT VFR BLD CALC: 48.8 %
HDLC SERPL-MCNC: 26 MG/DL
HGB BLD-MCNC: 15.8 G/DL
IMM GRANULOCYTES NFR BLD AUTO: 1.1 %
LDLC SERPL CALC-MCNC: 35 MG/DL
LDLC SERPL DIRECT ASSAY-MCNC: 52 MG/DL
LYMPHOCYTES # BLD AUTO: 3.01 K/UL
LYMPHOCYTES NFR BLD AUTO: 28.8 %
MAN DIFF?: NORMAL
MCHC RBC-ENTMCNC: 31.7 PG
MCHC RBC-ENTMCNC: 32.4 GM/DL
MCV RBC AUTO: 97.8 FL
MONOCYTES # BLD AUTO: 1.4 K/UL
MONOCYTES NFR BLD AUTO: 13.4 %
NEUTROPHILS # BLD AUTO: 5.39 K/UL
NEUTROPHILS NFR BLD AUTO: 51.6 %
PLATELET # BLD AUTO: 263 K/UL
POTASSIUM SERPL-SCNC: 4.5 MMOL/L
PROT SERPL-MCNC: 6.9 G/DL
RBC # BLD: 4.99 M/UL
RBC # FLD: 12.6 %
SODIUM SERPL-SCNC: 140 MMOL/L
TRIGL SERPL-MCNC: 207 MG/DL
TSH SERPL-ACNC: 1.72 UIU/ML
WBC # FLD AUTO: 10.45 K/UL

## 2020-07-17 NOTE — HISTORY OF PRESENT ILLNESS
[de-identified] : This is a return visit, telephonic visit, for this pleasant 54-year-old male with a history of asthma, cardiomyopathy.  He is in need of refill of his albuterol rescue inhaler and prefers the the Ventolin brand.  This was refilled today.  He denies having recent coughing or wheezing.  He does get an occasional sensation of mild shortness of breath.  He tells me he has asthma symptoms tend to act up in the springtime and fall.\par \par He is not having recent chest pain.  He denies recent fever or chills. [FreeTextEntry1] : RV

## 2020-07-17 NOTE — REVIEW OF SYSTEMS
[Fever] : no fever [Chills] : no chills [Wheezing] : no wheezing [Chest Pain] : no chest pain [Cough] : no cough

## 2020-07-17 NOTE — ASSESSMENT
[FreeTextEntry1] : #1  Moderate asthma.  Patient will continue his medical regimen.  I am refilling Ventolin inhaler today.  Continue following with Dr. Clifton.\par \par #2 Cardiomyopathy.  Continue following with Dr. Worley, cardiology.\par \par #3 HTN.\par \par #4 HPL. \par \par Total time spent on telephonic visit today was 14 minutes.

## 2020-07-24 ENCOUNTER — TRANSCRIPTION ENCOUNTER (OUTPATIENT)
Age: 55
End: 2020-07-24

## 2020-07-24 ENCOUNTER — RX RENEWAL (OUTPATIENT)
Age: 55
End: 2020-07-24

## 2020-07-27 ENCOUNTER — RX RENEWAL (OUTPATIENT)
Age: 55
End: 2020-07-27

## 2020-08-03 ENCOUNTER — TRANSCRIPTION ENCOUNTER (OUTPATIENT)
Age: 55
End: 2020-08-03

## 2020-08-31 ENCOUNTER — RX RENEWAL (OUTPATIENT)
Age: 55
End: 2020-08-31

## 2020-09-09 ENCOUNTER — RX RENEWAL (OUTPATIENT)
Age: 55
End: 2020-09-09

## 2020-10-05 NOTE — PROGRESS NOTE ADULT - PROVIDER SPECIALTY LIST ADULT
Gastroenterology
Hospitalist
Hospitalist
Nephrology
[facility] [place]

## 2020-10-09 ENCOUNTER — RX RENEWAL (OUTPATIENT)
Age: 55
End: 2020-10-09

## 2020-12-01 ENCOUNTER — RX RENEWAL (OUTPATIENT)
Age: 55
End: 2020-12-01

## 2021-01-04 ENCOUNTER — RX RENEWAL (OUTPATIENT)
Age: 56
End: 2021-01-04

## 2021-01-14 ENCOUNTER — APPOINTMENT (OUTPATIENT)
Dept: INTERNAL MEDICINE | Facility: CLINIC | Age: 56
End: 2021-01-14
Payer: COMMERCIAL

## 2021-01-14 PROCEDURE — 99214 OFFICE O/P EST MOD 30 MIN: CPT

## 2021-01-14 PROCEDURE — 99072 ADDL SUPL MATRL&STAF TM PHE: CPT

## 2021-01-14 RX ORDER — ALBUTEROL SULFATE 90 UG/1
108 (90 BASE) AEROSOL, METERED RESPIRATORY (INHALATION) EVERY 6 HOURS
Qty: 1 | Refills: 5 | Status: DISCONTINUED | COMMUNITY
Start: 2018-10-29 | End: 2021-01-14

## 2021-01-14 NOTE — HISTORY OF PRESENT ILLNESS
[FreeTextEntry1] : Shortness of breath [de-identified] : Ms. Hairston presents for a followup evaluation. He is complaining of increased shortness of breath. The patient has not been using her Symbicort inhaler. Does have an albuterol inhaler which he's been using it several times per day for the past 2-3 weeks. He has occasional nocturnal symptoms as well. Mr. Hairston denies any fevers, chills or hemoptysis. He is a nonsmoker.

## 2021-01-14 NOTE — PLAN
[FreeTextEntry1] : Mr. Hairston presents for evaluation. He is experiencing worsening shortness of breath and using albuterol inhaler multiple times per day. He will restart Symbicort 160/4.5 mcg 2 puffs b.i.d. and will be given a course of prednisone 20 mg b.i.d. x7 days. He will continue to use albuterol metered-dose inhaler therapy. Patient will followup in 7 days for complete pulmonary function test. If there is worsening of symptoms he will go to the emergency room for evaluation and treatment.

## 2021-02-17 ENCOUNTER — APPOINTMENT (OUTPATIENT)
Dept: UROLOGY | Facility: CLINIC | Age: 56
End: 2021-02-17

## 2021-02-19 DIAGNOSIS — Z20.822 ENCOUNTER FOR PREPROCEDURAL LABORATORY EXAMINATION: ICD-10-CM

## 2021-02-19 DIAGNOSIS — Z01.812 ENCOUNTER FOR PREPROCEDURAL LABORATORY EXAMINATION: ICD-10-CM

## 2021-02-22 LAB
ALBUMIN SERPL ELPH-MCNC: 4.4 G/DL
ALP BLD-CCNC: 61 U/L
ALT SERPL-CCNC: 33 U/L
ANION GAP SERPL CALC-SCNC: 10 MMOL/L
AST SERPL-CCNC: 21 U/L
BASOPHILS # BLD AUTO: 0.1 K/UL
BASOPHILS NFR BLD AUTO: 1 %
BILIRUB SERPL-MCNC: 0.4 MG/DL
BUN SERPL-MCNC: 21 MG/DL
CALCIUM SERPL-MCNC: 9.8 MG/DL
CHLORIDE SERPL-SCNC: 103 MMOL/L
CO2 SERPL-SCNC: 28 MMOL/L
CREAT SERPL-MCNC: 1.56 MG/DL
EOSINOPHIL # BLD AUTO: 0.27 K/UL
EOSINOPHIL NFR BLD AUTO: 2.7 %
GLUCOSE SERPL-MCNC: 116 MG/DL
HCT VFR BLD CALC: 47.8 %
HGB BLD-MCNC: 15.4 G/DL
IMM GRANULOCYTES NFR BLD AUTO: 1.5 %
LYMPHOCYTES # BLD AUTO: 3.22 K/UL
LYMPHOCYTES NFR BLD AUTO: 32.4 %
MAN DIFF?: NORMAL
MCHC RBC-ENTMCNC: 31.7 PG
MCHC RBC-ENTMCNC: 32.2 GM/DL
MCV RBC AUTO: 98.4 FL
MONOCYTES # BLD AUTO: 1.33 K/UL
MONOCYTES NFR BLD AUTO: 13.4 %
NEUTROPHILS # BLD AUTO: 4.87 K/UL
NEUTROPHILS NFR BLD AUTO: 49 %
PLATELET # BLD AUTO: 266 K/UL
POTASSIUM SERPL-SCNC: 5.1 MMOL/L
PROT SERPL-MCNC: 6.8 G/DL
RBC # BLD: 4.86 M/UL
RBC # FLD: 12.7 %
SODIUM SERPL-SCNC: 141 MMOL/L
WBC # FLD AUTO: 9.94 K/UL

## 2021-02-25 LAB — SARS-COV-2 N GENE NPH QL NAA+PROBE: NOT DETECTED

## 2021-02-26 ENCOUNTER — APPOINTMENT (OUTPATIENT)
Dept: INTERNAL MEDICINE | Facility: CLINIC | Age: 56
End: 2021-02-26
Payer: COMMERCIAL

## 2021-02-26 VITALS
TEMPERATURE: 97.9 F | HEIGHT: 73 IN | HEART RATE: 59 BPM | BODY MASS INDEX: 33.8 KG/M2 | WEIGHT: 255 LBS | RESPIRATION RATE: 18 BRPM | DIASTOLIC BLOOD PRESSURE: 88 MMHG | OXYGEN SATURATION: 96 % | SYSTOLIC BLOOD PRESSURE: 154 MMHG

## 2021-02-26 PROCEDURE — 99214 OFFICE O/P EST MOD 30 MIN: CPT | Mod: 25

## 2021-02-26 PROCEDURE — 94729 DIFFUSING CAPACITY: CPT

## 2021-02-26 PROCEDURE — 94727 GAS DIL/WSHOT DETER LNG VOL: CPT

## 2021-02-26 PROCEDURE — ZZZZZ: CPT

## 2021-02-26 PROCEDURE — 99072 ADDL SUPL MATRL&STAF TM PHE: CPT

## 2021-02-26 PROCEDURE — 94010 BREATHING CAPACITY TEST: CPT

## 2021-02-26 NOTE — PLAN
[FreeTextEntry1] : mr. Hairston presents for followup evaluation. Serum creatinine is again elevated. The patient be referred for nephrology evaluation with . He continue on Symbicort therapy. Complete weight function tests were reviewed with the patient. Followup in 6 months

## 2021-03-12 ENCOUNTER — APPOINTMENT (OUTPATIENT)
Dept: INTERNAL MEDICINE | Facility: CLINIC | Age: 56
End: 2021-03-12
Payer: COMMERCIAL

## 2021-03-12 ENCOUNTER — LABORATORY RESULT (OUTPATIENT)
Age: 56
End: 2021-03-12

## 2021-03-12 ENCOUNTER — NON-APPOINTMENT (OUTPATIENT)
Age: 56
End: 2021-03-12

## 2021-03-12 VITALS
BODY MASS INDEX: 32.07 KG/M2 | HEIGHT: 73 IN | TEMPERATURE: 98.2 F | WEIGHT: 242 LBS | OXYGEN SATURATION: 96 % | DIASTOLIC BLOOD PRESSURE: 84 MMHG | RESPIRATION RATE: 16 BRPM | HEART RATE: 60 BPM | SYSTOLIC BLOOD PRESSURE: 124 MMHG

## 2021-03-12 DIAGNOSIS — R05 COUGH: ICD-10-CM

## 2021-03-12 DIAGNOSIS — Z87.898 PERSONAL HISTORY OF OTHER SPECIFIED CONDITIONS: ICD-10-CM

## 2021-03-12 PROCEDURE — 99072 ADDL SUPL MATRL&STAF TM PHE: CPT

## 2021-03-12 PROCEDURE — 99213 OFFICE O/P EST LOW 20 MIN: CPT

## 2021-03-12 NOTE — HISTORY OF PRESENT ILLNESS
[FreeTextEntry8] : Mr. Hairston presents for evaluation. He was showering last night discomfort take on his back. He was able to remove the tick and placed in a bag for sample. He has no fever, chills or joint pain.

## 2021-03-12 NOTE — PLAN
[FreeTextEntry1] : Mr. Martinez presents for evaluation. He removed a tick from his back last night. He had been out working in his yard. He will be given doxycycline 200 mg x1 dose. Tick will be sent for analysis.

## 2021-03-18 LAB — B BURGDOR DNA TICK QL NAA+PROBE: NOT DETECTED

## 2021-03-19 ENCOUNTER — NON-APPOINTMENT (OUTPATIENT)
Age: 56
End: 2021-03-19

## 2021-04-05 ENCOUNTER — RX RENEWAL (OUTPATIENT)
Age: 56
End: 2021-04-05

## 2021-04-23 ENCOUNTER — NON-APPOINTMENT (OUTPATIENT)
Age: 56
End: 2021-04-23

## 2021-04-23 ENCOUNTER — APPOINTMENT (OUTPATIENT)
Dept: CARDIOLOGY | Facility: CLINIC | Age: 56
End: 2021-04-23
Payer: COMMERCIAL

## 2021-04-23 VITALS
TEMPERATURE: 98.2 F | WEIGHT: 256 LBS | HEIGHT: 73 IN | SYSTOLIC BLOOD PRESSURE: 136 MMHG | BODY MASS INDEX: 33.93 KG/M2 | DIASTOLIC BLOOD PRESSURE: 80 MMHG | HEART RATE: 61 BPM | OXYGEN SATURATION: 95 %

## 2021-04-23 PROCEDURE — 93000 ELECTROCARDIOGRAM COMPLETE: CPT

## 2021-04-23 PROCEDURE — 99072 ADDL SUPL MATRL&STAF TM PHE: CPT

## 2021-04-23 PROCEDURE — 99214 OFFICE O/P EST MOD 30 MIN: CPT

## 2021-04-23 RX ORDER — DOXYCYCLINE HYCLATE 100 MG/1
100 TABLET ORAL
Qty: 2 | Refills: 1 | Status: DISCONTINUED | COMMUNITY
Start: 2021-03-12 | End: 2021-04-23

## 2021-04-23 RX ORDER — HYDRALAZINE HYDROCHLORIDE 50 MG/1
50 TABLET ORAL
Qty: 180 | Refills: 3 | Status: DISCONTINUED | COMMUNITY
Start: 2018-06-22 | End: 2021-04-23

## 2021-04-23 RX ORDER — PREDNISONE 20 MG/1
20 TABLET ORAL TWICE DAILY
Qty: 14 | Refills: 2 | Status: DISCONTINUED | COMMUNITY
Start: 2021-01-14 | End: 2021-04-23

## 2021-04-23 NOTE — REASON FOR VISIT
[Follow-Up - Clinic] : a clinic follow-up of [Cardiomyopathy] : cardiomyopathy [Hyperlipidemia] : hyperlipidemia [Hypertension] : hypertension

## 2021-04-23 NOTE — PHYSICAL EXAM
[General Appearance - Well Developed] : well developed [Normal Appearance] : normal appearance [Well Groomed] : well groomed [General Appearance - Well Nourished] : well nourished [No Deformities] : no deformities [General Appearance - In No Acute Distress] : no acute distress [Normal Conjunctiva] : the conjunctiva exhibited no abnormalities [Eyelids - No Xanthelasma] : the eyelids demonstrated no xanthelasmas [Normal Oral Mucosa] : normal oral mucosa [No Oral Pallor] : no oral pallor [No Oral Cyanosis] : no oral cyanosis [Normal Jugular Venous A Waves Present] : normal jugular venous A waves present [Normal Jugular Venous V Waves Present] : normal jugular venous V waves present [No Jugular Venous Mahmood A Waves] : no jugular venous mahmood A waves [Respiration, Rhythm And Depth] : normal respiratory rhythm and effort [Exaggerated Use Of Accessory Muscles For Inspiration] : no accessory muscle use [Auscultation Breath Sounds / Voice Sounds] : lungs were clear to auscultation bilaterally [Heart Rate And Rhythm] : heart rate and rhythm were normal [Heart Sounds] : normal S1 and S2 [Murmurs] : no murmurs present [Abdomen Tenderness] : non-tender [Abdomen Soft] : soft [Abdomen Mass (___ Cm)] : no abdominal mass palpated [Abnormal Walk] : normal gait [Gait - Sufficient For Exercise Testing] : the gait was sufficient for exercise testing [Nail Clubbing] : no clubbing of the fingernails [Petechial Hemorrhages (___cm)] : no petechial hemorrhages [Cyanosis, Localized] : no localized cyanosis [Skin Color & Pigmentation] : normal skin color and pigmentation [] : no rash [No Venous Stasis] : no venous stasis [Skin Lesions] : no skin lesions [No Skin Ulcers] : no skin ulcer [No Xanthoma] : no  xanthoma was observed [Oriented To Time, Place, And Person] : oriented to person, place, and time [Affect] : the affect was normal [Mood] : the mood was normal [No Anxiety] : not feeling anxious

## 2021-04-23 NOTE — HISTORY OF PRESENT ILLNESS
[FreeTextEntry1] : 54 yo male presents for evaluation of HTN, HLD. Pt reports fatigue and loss of libido. Pt denies chest pain or shortness of breath. Medications and prior notes were reviewed with the patient.

## 2021-04-23 NOTE — DISCUSSION/SUMMARY
[Hyperlipidemia] : hyperlipidemia [Hypertension] : hypertension [Stable] : stable [Patient] : the patient [FreeTextEntry1] : Pt will discontinue hydralazine, he will reduce metoprolol to 100 mg daily. He will have an Echo to evaluate LV function. He will follow up in 3 months.

## 2021-06-24 ENCOUNTER — TRANSCRIPTION ENCOUNTER (OUTPATIENT)
Age: 56
End: 2021-06-24

## 2021-07-26 NOTE — PROGRESS NOTE ADULT - PROBLEM SELECTOR PLAN 1
Etiology of his symptoms.  Aggressive blood pressure management and there is a chance for LV FX improvement.  Discharge as planned
Etiology of his symptoms.  Aggressive blood pressure management and there is a chance for LV FX improvement.  Will benefit from BB
Etiology of his symptoms.  Aggressive blood pressure management and there is a chance for LV FX improvement.  Will benefit from BB and ACE/ARB but will wait to start ace/arb once renal gives the ok given impaired renal function.  Med adjustments made post cath and if bp improved can possibly dc tomorrow. with out patient follow up with me.
I personally supervised the study.  I reviewed the images and interpretation by the resident/ACP and have edited where appropriate.

## 2021-07-29 ENCOUNTER — APPOINTMENT (OUTPATIENT)
Dept: CARDIOLOGY | Facility: CLINIC | Age: 56
End: 2021-07-29
Payer: COMMERCIAL

## 2021-07-29 ENCOUNTER — NON-APPOINTMENT (OUTPATIENT)
Age: 56
End: 2021-07-29

## 2021-07-29 VITALS
HEART RATE: 60 BPM | DIASTOLIC BLOOD PRESSURE: 101 MMHG | SYSTOLIC BLOOD PRESSURE: 160 MMHG | HEIGHT: 73 IN | OXYGEN SATURATION: 95 % | BODY MASS INDEX: 31.01 KG/M2 | WEIGHT: 234 LBS

## 2021-07-29 VITALS — SYSTOLIC BLOOD PRESSURE: 140 MMHG | DIASTOLIC BLOOD PRESSURE: 86 MMHG

## 2021-07-29 PROCEDURE — 93306 TTE W/DOPPLER COMPLETE: CPT

## 2021-07-29 PROCEDURE — 99214 OFFICE O/P EST MOD 30 MIN: CPT | Mod: 25

## 2021-07-29 PROCEDURE — 93000 ELECTROCARDIOGRAM COMPLETE: CPT

## 2021-07-30 NOTE — DISCUSSION/SUMMARY
[Hyperlipidemia] : hyperlipidemia [Hypertension] : hypertension [Stable] : stable [Patient] : the patient [FreeTextEntry1] : HTN: Patients BP is elevated in the office today however he was aggravated waiting to be called in.  He assures me that his BP on all other occasions have been well controlled.  Recommended he continue to monitor BP at home; goal /80 or below.  If readings are consistently elevated, he is to contact our office. He is scheduled to see PCP in September.  If BP is elevated at that time, I would recommend adding back low dose of hydralazine. Patient encouraged to eat low sodium, low chol, low saturated fat diet.\par \par CM: EF on last echo improved.  Patient had echo today and will be contacted with results.\par \par HPL: continue statin, labs ordered\par \par Nonobstructive CAD: continue asa, BB, statin\par \par Patient will follow up in 6 months.\par \par \par Addendum 7/30/21: Patient called the office today to say he is actually taking metoprolol 100mg BID.  He made a mistake and never decreased it.  I recommended he continue taking the medication the way he is taking it as his BP in the office was a little elevated but at home it appears to be well controlled.  He has follow up scheduled September with PCP. If BP elevated at that time, would consider adding back low dose hydralazine.

## 2021-07-30 NOTE — REVIEW OF SYSTEMS
[Feeling Fatigued] : feeling fatigued [Fever] : no fever [Headache] : no headache [Chills] : no chills [SOB] : no shortness of breath [Dyspnea on exertion] : not dyspnea during exertion [Chest Discomfort] : no chest discomfort [Lower Ext Edema] : no extremity edema [Leg Claudication] : no intermittent leg claudication [Palpitations] : no palpitations [Orthopnea] : no orthopnea [PND] : no PND [Syncope] : no syncope [Cough] : no cough [Wheezing] : no wheezing [Abdominal Pain] : no abdominal pain [Urinary Frequency] : no change in urinary frequency [Joint Pain] : no joint pain [Rash] : no rash [Dizziness] : no dizziness [Confusion] : no confusion was observed [Easy Bleeding] : no tendency for easy bleeding

## 2021-07-30 NOTE — PHYSICAL EXAM
[General Appearance - Well Developed] : well developed [Normal Appearance] : normal appearance [Well Groomed] : well groomed [General Appearance - Well Nourished] : well nourished [No Deformities] : no deformities [General Appearance - In No Acute Distress] : no acute distress [Eyelids - No Xanthelasma] : the eyelids demonstrated no xanthelasmas [Normal Oral Mucosa] : normal oral mucosa [No Oral Pallor] : no oral pallor [No Oral Cyanosis] : no oral cyanosis [Normal Jugular Venous A Waves Present] : normal jugular venous A waves present [Normal Jugular Venous V Waves Present] : normal jugular venous V waves present [No Jugular Venous Mahmood A Waves] : no jugular venous mahmood A waves [Respiration, Rhythm And Depth] : normal respiratory rhythm and effort [Exaggerated Use Of Accessory Muscles For Inspiration] : no accessory muscle use [Auscultation Breath Sounds / Voice Sounds] : lungs were clear to auscultation bilaterally [Heart Rate And Rhythm] : heart rate and rhythm were normal [Heart Sounds] : normal S1 and S2 [Murmurs] : no murmurs present [Abdomen Soft] : soft [Abdomen Tenderness] : non-tender [Abdomen Mass (___ Cm)] : no abdominal mass palpated [Abnormal Walk] : normal gait [Gait - Sufficient For Exercise Testing] : the gait was sufficient for exercise testing [Nail Clubbing] : no clubbing of the fingernails [Cyanosis, Localized] : no localized cyanosis [Petechial Hemorrhages (___cm)] : no petechial hemorrhages [Skin Color & Pigmentation] : normal skin color and pigmentation [] : no rash [No Venous Stasis] : no venous stasis [Skin Lesions] : no skin lesions [No Skin Ulcers] : no skin ulcer [No Xanthoma] : no  xanthoma was observed [Oriented To Time, Place, And Person] : oriented to person, place, and time [Affect] : the affect was normal [Mood] : the mood was normal [No Anxiety] : not feeling anxious [Well Developed] : well developed [Well Nourished] : well nourished [No Acute Distress] : no acute distress [Normal Conjunctiva] : normal conjunctiva [Normal Venous Pressure] : normal venous pressure [No Carotid Bruit] : no carotid bruit [Normal S1, S2] : normal S1, S2 [No Murmur] : no murmur [No Rub] : no rub [No Gallop] : no gallop [Clear Lung Fields] : clear lung fields [Good Air Entry] : good air entry [No Respiratory Distress] : no respiratory distress  [Soft] : abdomen soft [Non Tender] : non-tender [No Masses/organomegaly] : no masses/organomegaly [Normal Gait] : normal gait [No Edema] : no edema [No Rash] : no rash [Moves all extremities] : moves all extremities [Alert and Oriented] : alert and oriented [Normal memory] : normal memory

## 2021-07-30 NOTE — HISTORY OF PRESENT ILLNESS
[FreeTextEntry1] : 54 yo male PMH: HTN, HLD, CAD, CM, renal insufficiency (not followed by renal) who presents to the office today for a routine follow up.  Overall, he is feeling well and offers no complaints of chest pain, shortness of breath, palpitations, lightheadedness or syncope.. He denies PND, orthopnea or lower extremity edema.  His main complaint is fatigue (not new).\par \par Echo 2018 normal LVF, EF 55-60%, moderate LVH, mild DD.  He did have an echocardiogram prior to his appointment today.\par \par EKG: sinus rhythm, nonspecific ST changes (unchanged)

## 2021-08-05 ENCOUNTER — RX RENEWAL (OUTPATIENT)
Age: 56
End: 2021-08-05

## 2021-09-23 ENCOUNTER — RX RENEWAL (OUTPATIENT)
Age: 56
End: 2021-09-23

## 2021-10-10 LAB
25(OH)D3 SERPL-MCNC: 44.1 NG/ML
ALBUMIN SERPL ELPH-MCNC: 4.3 G/DL
ALP BLD-CCNC: 63 U/L
ALT SERPL-CCNC: 27 U/L
ANION GAP SERPL CALC-SCNC: 11 MMOL/L
AST SERPL-CCNC: 16 U/L
BASOPHILS # BLD AUTO: 0.13 K/UL
BASOPHILS NFR BLD AUTO: 1.2 %
BILIRUB SERPL-MCNC: 0.4 MG/DL
BUN SERPL-MCNC: 20 MG/DL
CALCIUM SERPL-MCNC: 9.2 MG/DL
CHLORIDE SERPL-SCNC: 106 MMOL/L
CHOLEST SERPL-MCNC: 125 MG/DL
CK SERPL-CCNC: 113 U/L
CO2 SERPL-SCNC: 22 MMOL/L
CREAT SERPL-MCNC: 1.37 MG/DL
EOSINOPHIL # BLD AUTO: 0.46 K/UL
EOSINOPHIL NFR BLD AUTO: 4.3 %
GLUCOSE SERPL-MCNC: 101 MG/DL
HCT VFR BLD CALC: 46 %
HDLC SERPL-MCNC: 28 MG/DL
HGB BLD-MCNC: 14.7 G/DL
IMM GRANULOCYTES NFR BLD AUTO: 1.7 %
LDLC SERPL CALC-MCNC: 68 MG/DL
LYMPHOCYTES # BLD AUTO: 2.84 K/UL
LYMPHOCYTES NFR BLD AUTO: 26.7 %
MAGNESIUM SERPL-MCNC: 2.3 MG/DL
MAN DIFF?: NORMAL
MCHC RBC-ENTMCNC: 31.3 PG
MCHC RBC-ENTMCNC: 32 GM/DL
MCV RBC AUTO: 98.1 FL
MONOCYTES # BLD AUTO: 1.31 K/UL
MONOCYTES NFR BLD AUTO: 12.3 %
NEUTROPHILS # BLD AUTO: 5.7 K/UL
NEUTROPHILS NFR BLD AUTO: 53.8 %
NONHDLC SERPL-MCNC: 97 MG/DL
PLATELET # BLD AUTO: 235 K/UL
POTASSIUM SERPL-SCNC: 4.6 MMOL/L
PROT SERPL-MCNC: 6.5 G/DL
RBC # BLD: 4.69 M/UL
RBC # FLD: 12.8 %
SODIUM SERPL-SCNC: 140 MMOL/L
TRIGL SERPL-MCNC: 143 MG/DL
TSH SERPL-ACNC: 1.44 UIU/ML
WBC # FLD AUTO: 10.62 K/UL

## 2021-10-10 NOTE — DISCHARGE NOTE ADULT - HOSPITAL COURSE
Warm patient admitted with a right renal mass, underwent robotic right partial nephrectomy on 2/20/2019 with Dr. Esquivel with no joe-op complications. Patient tolerated the procedure well.

## 2021-10-13 ENCOUNTER — APPOINTMENT (OUTPATIENT)
Dept: INTERNAL MEDICINE | Facility: CLINIC | Age: 56
End: 2021-10-13
Payer: COMMERCIAL

## 2021-10-13 ENCOUNTER — NON-APPOINTMENT (OUTPATIENT)
Age: 56
End: 2021-10-13

## 2021-10-13 VITALS
HEART RATE: 76 BPM | DIASTOLIC BLOOD PRESSURE: 90 MMHG | HEIGHT: 73 IN | SYSTOLIC BLOOD PRESSURE: 140 MMHG | OXYGEN SATURATION: 97 % | BODY MASS INDEX: 31.54 KG/M2 | WEIGHT: 238 LBS | RESPIRATION RATE: 16 BRPM | TEMPERATURE: 98.5 F
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
## 2021-10-13 PROCEDURE — 99214 OFFICE O/P EST MOD 30 MIN: CPT

## 2021-10-13 RX ORDER — ALBUTEROL SULFATE 90 UG/1
108 (90 BASE) AEROSOL, METERED RESPIRATORY (INHALATION)
Qty: 18 | Refills: 2 | Status: DISCONTINUED | COMMUNITY
Start: 2020-07-15 | End: 2021-10-13

## 2021-10-13 NOTE — PLAN
[FreeTextEntry1] : Mr. Hairston presents for a followup evaluation. He will continue on current medication regimen which has been reviewed and revised. Comprehensive lab work was reviewed. Patient will followup in 6 months with CBC, CMP, lipid profile and PSA level. Losartan 100 mg daily has been renewed.

## 2021-10-13 NOTE — HISTORY OF PRESENT ILLNESS
[FreeTextEntry1] : Followup [de-identified] : Mr. Hairston presents for followup evaluation. He denies any chest pain, shortness breath palpitations. Patient has no nocturnal symptoms of cough or dyspnea.

## 2021-12-15 ENCOUNTER — RX RENEWAL (OUTPATIENT)
Age: 56
End: 2021-12-15

## 2022-01-02 ENCOUNTER — OUTPATIENT (OUTPATIENT)
Dept: OUTPATIENT SERVICES | Facility: HOSPITAL | Age: 57
LOS: 1 days | End: 2022-01-02
Payer: COMMERCIAL

## 2022-01-02 DIAGNOSIS — Z20.828 CONTACT WITH AND (SUSPECTED) EXPOSURE TO OTHER VIRAL COMMUNICABLE DISEASES: ICD-10-CM

## 2022-01-02 DIAGNOSIS — Z98.52 VASECTOMY STATUS: Chronic | ICD-10-CM

## 2022-01-02 LAB — SARS-COV-2 RNA SPEC QL NAA+PROBE: DETECTED

## 2022-01-02 PROCEDURE — U0005: CPT

## 2022-01-02 PROCEDURE — U0003: CPT

## 2022-01-02 PROCEDURE — C9803: CPT

## 2022-01-03 DIAGNOSIS — Z20.828 CONTACT WITH AND (SUSPECTED) EXPOSURE TO OTHER VIRAL COMMUNICABLE DISEASES: ICD-10-CM

## 2022-02-09 ENCOUNTER — RX RENEWAL (OUTPATIENT)
Age: 57
End: 2022-02-09

## 2022-02-09 ENCOUNTER — TRANSCRIPTION ENCOUNTER (OUTPATIENT)
Age: 57
End: 2022-02-09

## 2022-03-02 ENCOUNTER — RX RENEWAL (OUTPATIENT)
Age: 57
End: 2022-03-02

## 2022-03-09 NOTE — ED PROVIDER NOTE - OBJECTIVE STATEMENT
Patient saw physical therapy and they told her she doesn't need to be on oxygen anymore.  Patient's daughter wanted to confirm if it was okay to return the oxygen supplies of if they should still be holding on to it.   Pt comes to the Ed complaining of dyspnea. Pt states that he has asthma and has been seen by his asthma specialist and been on steroids several times for this. States it helps but then when he is off the steroids he then has trouble breathing. No ICU stays, no hospitalizations for this. Pt was worried because he was not sure if this was cardiac so came for eval. Last night no fevers but trouble breathing on laying down. Pt comes to the Ed complaining of dyspnea. Pt states that he has asthma and has been seen by his asthma specialist and been on steroids several times for this. States it helps but then when he is off the steroids he then has trouble breathing. No ICU stays, no hospitalizations for this. Pt was worried because he was not sure if this was cardiac so came for eval. Last night no fevers but trouble breathing on laying down. No PMD. Has been coughing more so at night.

## 2022-03-14 ENCOUNTER — TRANSCRIPTION ENCOUNTER (OUTPATIENT)
Age: 57
End: 2022-03-14

## 2022-05-31 NOTE — DISCHARGE NOTE ADULT - ABILITY TO HEAR (WITH HEARING AID OR HEARING APPLIANCE IF NORMALLY USED):
Adequate: hears normal conversation without difficulty SSKI Counseling:  I discussed with the patient the risks of SSKI including but not limited to thyroid abnormalities, metallic taste, GI upset, fever, headache, acne, arthralgias, paraesthesias, lymphadenopathy, easy bleeding, arrhythmias, and allergic reaction.

## 2022-06-01 ENCOUNTER — APPOINTMENT (OUTPATIENT)
Dept: INTERNAL MEDICINE | Facility: CLINIC | Age: 57
End: 2022-06-01
Payer: COMMERCIAL

## 2022-06-01 ENCOUNTER — NON-APPOINTMENT (OUTPATIENT)
Age: 57
End: 2022-06-01

## 2022-06-01 VITALS
BODY MASS INDEX: 32.07 KG/M2 | SYSTOLIC BLOOD PRESSURE: 140 MMHG | HEIGHT: 73 IN | HEART RATE: 70 BPM | OXYGEN SATURATION: 97 % | TEMPERATURE: 98.2 F | WEIGHT: 242 LBS | RESPIRATION RATE: 16 BRPM | DIASTOLIC BLOOD PRESSURE: 80 MMHG

## 2022-06-01 DIAGNOSIS — Z86.79 PERSONAL HISTORY OF OTHER DISEASES OF THE CIRCULATORY SYSTEM: ICD-10-CM

## 2022-06-01 PROCEDURE — 99214 OFFICE O/P EST MOD 30 MIN: CPT

## 2022-06-01 NOTE — HISTORY OF PRESENT ILLNESS
[FreeTextEntry1] : followup [de-identified] : Mr. Hairston presents for a followup evaluation. He is feeling well. Patient denies any chest pain, shortness of breath palpitations. He has no nocturnal symptoms of cough or dyspnea. He continues on Symbicort 160/4.5 mcg 2 puffs b.i.d. He has an albuterol inhaler for rescue therapy which he has not required.

## 2022-06-01 NOTE — PLAN
[FreeTextEntry1] : Mr. Hairston presents for followup evaluation.\par \par #1. Patient will continue on current medication regimen which has been reviewed and revised.\par \par #2. Patient given prescription for CBC, CMP with hemoglobin A1c, iron level, lipid profile, PSA level, TSH and urinalysis.\par \par #3.Followup in 6 months.

## 2022-08-07 ENCOUNTER — RX RENEWAL (OUTPATIENT)
Age: 57
End: 2022-08-07

## 2022-08-25 ENCOUNTER — RX RENEWAL (OUTPATIENT)
Age: 57
End: 2022-08-25

## 2022-11-07 ENCOUNTER — RX RENEWAL (OUTPATIENT)
Age: 57
End: 2022-11-07

## 2023-01-04 NOTE — PATIENT PROFILE ADULT - PATIENT REPRESENTATIVE: ( YOU CAN CHOOSE ANY PERSON THAT CAN ASSIST YOU WITH YOUR HEALTH CARE PREFERENCES, DOES NOT HAVE TO BE A SPOUSE, IMMEDIATE FAMILY OR SIGNIFICANT OTHER/PARTNER)
Vaccine Information Statement(s) or the Emergency Use Authorization was given today. This has been reviewed, questions answered, and verbal consent given by Patient for injection(s) and administration of COVID-19 Immunization Pfizer COVID-19 and Influenza (Inactivated).        Patient tolerated without incident. See immunization grid for documentation.    INFLUENZA  LOT: [id number]  EXP:06/18/2023    COVID BIVALENT BOOSTER  LOT:[id number]  EXP: 1/31/24   yes

## 2023-02-08 ENCOUNTER — RX RENEWAL (OUTPATIENT)
Age: 58
End: 2023-02-08

## 2023-02-14 NOTE — DISCHARGE NOTE ADULT - NS DC ANGIO PCI YN
Spoke with the patient he had refills on his xanax instructed him to get with his pharmacy he should still have refills. Pt agrees   no

## 2023-03-07 ENCOUNTER — RX RENEWAL (OUTPATIENT)
Age: 58
End: 2023-03-07

## 2023-04-03 ENCOUNTER — RESULT CHARGE (OUTPATIENT)
Age: 58
End: 2023-04-03

## 2023-04-04 ENCOUNTER — APPOINTMENT (OUTPATIENT)
Dept: INTERNAL MEDICINE | Facility: CLINIC | Age: 58
End: 2023-04-04
Payer: COMMERCIAL

## 2023-04-04 ENCOUNTER — NON-APPOINTMENT (OUTPATIENT)
Age: 58
End: 2023-04-04

## 2023-04-04 VITALS
HEART RATE: 69 BPM | OXYGEN SATURATION: 95 % | RESPIRATION RATE: 16 BRPM | TEMPERATURE: 97.6 F | HEIGHT: 73 IN | SYSTOLIC BLOOD PRESSURE: 130 MMHG | DIASTOLIC BLOOD PRESSURE: 90 MMHG | WEIGHT: 246 LBS | BODY MASS INDEX: 32.6 KG/M2

## 2023-04-04 PROCEDURE — 94060 EVALUATION OF WHEEZING: CPT

## 2023-04-04 PROCEDURE — 99214 OFFICE O/P EST MOD 30 MIN: CPT | Mod: 25

## 2023-04-04 NOTE — HISTORY OF PRESENT ILLNESS
[TextBox_4] : Mr. Hairston presents for a follow-up evaluation.\par He continues on Symbicort 160/4.5 mcg 2 puffs twice daily.\par Patient states that he is using his albuterol inhaler 2-3 times per day on a regular basis.  He states that as soon as he gets an episode of chest tightness he uses the rescue inhaler.  Patient does have a history of cardiomyopathy and has not had a cardiac evaluation in over a year.  He denies any nocturnal symptoms of cough or shortness of breath.

## 2023-04-04 NOTE — DISCUSSION/SUMMARY
[FreeTextEntry1] : Mr. Hairston presents for follow-up evaluation.  He will continue on Symbicort 160/4.5 mcg 2 puffs twice daily.  I have counseled him not to use albuterol on an automatic basis.  I explained that it only should be used as needed and that to 4 times per day is too many.  He will be given a trial of prednisone 20 mg twice daily x7 days to see if he decreases albuterol use.  He may be candidate for Singulair 10 mg daily.  The patient does complain of symptoms of chest tightness.  He has a history of cardiomyopathy.  I have advised him to make an appointment for follow-up with cardiology since he has not been seen since October 2021.  Last echocardiogram from 7/29 6/21 showed a normal echocardiogram with normal LV function.

## 2023-04-04 NOTE — PROCEDURE
[FreeTextEntry1] : Spirometry pre and postbronchodilator therapy were performed.\par FVC 4.19 L which is 79% predicted.\par FEV1 2.70 L which is 67% predicted.\par FEV1/FVC ratio 65%.\par FEF 25/75% is 1.70 L/s which is 51% predicted.\par PEF 6.36 L/s which is 64% predicted.\par Postbronchodilator: FVC 4.40 L which is 83% predicted.  FEV1 2.72 L which is 67% predicted.  FEV1 over FVC ratio 62%.  There is no significant bronchodilator response.

## 2023-05-02 ENCOUNTER — RX RENEWAL (OUTPATIENT)
Age: 58
End: 2023-05-02

## 2023-06-01 ENCOUNTER — RX RENEWAL (OUTPATIENT)
Age: 58
End: 2023-06-01

## 2023-06-21 ENCOUNTER — RX RENEWAL (OUTPATIENT)
Age: 58
End: 2023-06-21

## 2023-06-28 ENCOUNTER — RX RENEWAL (OUTPATIENT)
Age: 58
End: 2023-06-28

## 2023-07-19 RX ORDER — AMLODIPINE BESYLATE 5 MG/1
5 TABLET ORAL
Qty: 180 | Refills: 3 | Status: ACTIVE | COMMUNITY
Start: 2018-06-15 | End: 1900-01-01

## 2023-07-27 ENCOUNTER — NON-APPOINTMENT (OUTPATIENT)
Age: 58
End: 2023-07-27

## 2023-07-27 ENCOUNTER — APPOINTMENT (OUTPATIENT)
Dept: CARDIOLOGY | Facility: CLINIC | Age: 58
End: 2023-07-27
Payer: COMMERCIAL

## 2023-07-27 VITALS
WEIGHT: 240 LBS | HEART RATE: 62 BPM | HEIGHT: 73 IN | BODY MASS INDEX: 31.81 KG/M2 | OXYGEN SATURATION: 96 % | SYSTOLIC BLOOD PRESSURE: 160 MMHG | DIASTOLIC BLOOD PRESSURE: 94 MMHG

## 2023-07-27 PROCEDURE — 99214 OFFICE O/P EST MOD 30 MIN: CPT | Mod: 25

## 2023-07-27 PROCEDURE — 93000 ELECTROCARDIOGRAM COMPLETE: CPT

## 2023-07-27 RX ORDER — PREDNISONE 20 MG/1
20 TABLET ORAL TWICE DAILY
Qty: 14 | Refills: 1 | Status: DISCONTINUED | COMMUNITY
Start: 2023-04-04 | End: 2023-07-27

## 2023-07-27 RX ORDER — MONTELUKAST 10 MG/1
10 TABLET, FILM COATED ORAL
Qty: 90 | Refills: 1 | Status: DISCONTINUED | COMMUNITY
Start: 2018-06-15 | End: 2023-07-27

## 2023-07-27 NOTE — PHYSICAL EXAM
[Normal S1, S2] : normal S1, S2 [Normal Gait] : normal gait [No Edema] : no edema [Normal] : moves all extremities, no focal deficits, normal speech [Alert and Oriented] : alert and oriented [de-identified] : Overweight

## 2023-07-27 NOTE — DISCUSSION/SUMMARY
[Hyperlipidemia] : hyperlipidemia [Hypertension] : hypertension [Stable] : stable [Patient] : the patient [EKG obtained to assist in diagnosis and management of assessed problem(s)] : EKG obtained to assist in diagnosis and management of assessed problem(s) [FreeTextEntry1] : Here today in routine cardiac follow up after a 2 year absence with above hx,\par \par HTN: Sub optimal control However, ran out of meds and just resumed this week he will CW current antihypertensive pharmacotherapy, repeat BP 6 weeks, labs advised and ordered\par \par CM: s/p CC 2018 Non obstructive coronary arteries with severely reduce LV FX EF 30% \par Echo 7/2021 reviewed;  NL LV SYS FX EF 69%\par \par EKG SR with NS TW abnormality advised stress test\par \par HLD: continue statin, labs ordered\par \par Obesity: Advised weight reduction/low sodium diet and sleep study ( Patient declines sleep study ) \par \par OV 6 weeks \par \par Plan DW patient

## 2023-07-27 NOTE — HISTORY OF PRESENT ILLNESS
[FreeTextEntry1] : 58 yo male PMH: HTN, HLD, CAD, NICM/Non obstructive CAD, renal insufficiency who presents to the office today after a long absence for a routine cardiac follow up. Claims to be feeling well no cardiovascular complaints\par pressure elevated today However, states he ran out of his medications for 1 month and just resumed this week \par \par EKG today NSR NS T wave abnormality \par Echo 7/29/21 NL LV SYS FX Minimal MR\par Cardiac cath 2018 CC 2018 Non obstructive coronary arteries with severely reduce LV FX EF 30%

## 2023-10-10 LAB
24R-OH-CALCIDIOL SERPL-MCNC: 43.2 PG/ML
ALBUMIN SERPL ELPH-MCNC: 4.4 G/DL
ALP BLD-CCNC: 64 U/L
ALT SERPL-CCNC: 36 U/L
ANION GAP SERPL CALC-SCNC: 11 MMOL/L
AST SERPL-CCNC: 22 U/L
BILIRUB SERPL-MCNC: 0.3 MG/DL
BUN SERPL-MCNC: 18 MG/DL
CALCIUM SERPL-MCNC: 9.7 MG/DL
CHLORIDE SERPL-SCNC: 105 MMOL/L
CHOLEST SERPL-MCNC: 134 MG/DL
CO2 SERPL-SCNC: 24 MMOL/L
CREAT SERPL-MCNC: 1.26 MG/DL
EGFR: 66 ML/MIN/1.73M2
GLUCOSE SERPL-MCNC: 114 MG/DL
HCT VFR BLD CALC: 48.1 %
HDLC SERPL-MCNC: 25 MG/DL
HGB BLD-MCNC: 15.7 G/DL
LDLC SERPL CALC-MCNC: 67 MG/DL
MAGNESIUM SERPL-MCNC: 2.1 MG/DL
MCHC RBC-ENTMCNC: 32.4 PG
MCHC RBC-ENTMCNC: 32.6 GM/DL
MCV RBC AUTO: 99.4 FL
NONHDLC SERPL-MCNC: 109 MG/DL
PLATELET # BLD AUTO: 241 K/UL
POTASSIUM SERPL-SCNC: 4.9 MMOL/L
PROT SERPL-MCNC: 6.5 G/DL
RBC # BLD: 4.84 M/UL
RBC # FLD: 12.9 %
SODIUM SERPL-SCNC: 140 MMOL/L
T3 SERPL-MCNC: 106 NG/DL
T4 SERPL-MCNC: 6.1 UG/DL
TRIGL SERPL-MCNC: 260 MG/DL
TSH SERPL-ACNC: 2.28 UIU/ML
WBC # FLD AUTO: 10.97 K/UL

## 2023-10-11 ENCOUNTER — APPOINTMENT (OUTPATIENT)
Dept: INTERNAL MEDICINE | Facility: CLINIC | Age: 58
End: 2023-10-11
Payer: COMMERCIAL

## 2023-10-11 VITALS
SYSTOLIC BLOOD PRESSURE: 140 MMHG | WEIGHT: 261 LBS | BODY MASS INDEX: 34.59 KG/M2 | RESPIRATION RATE: 16 BRPM | OXYGEN SATURATION: 94 % | HEART RATE: 81 BPM | HEIGHT: 73 IN | TEMPERATURE: 98.7 F | DIASTOLIC BLOOD PRESSURE: 80 MMHG

## 2023-10-11 DIAGNOSIS — Z00.00 ENCOUNTER FOR GENERAL ADULT MEDICAL EXAMINATION W/OUT ABNORMAL FINDINGS: ICD-10-CM

## 2023-10-11 DIAGNOSIS — J45.909 UNSPECIFIED ASTHMA, UNCOMPLICATED: ICD-10-CM

## 2023-10-11 DIAGNOSIS — J01.90 ACUTE SINUSITIS, UNSPECIFIED: ICD-10-CM

## 2023-10-11 DIAGNOSIS — I42.8 OTHER CARDIOMYOPATHIES: ICD-10-CM

## 2023-10-11 DIAGNOSIS — Z87.891 PERSONAL HISTORY OF NICOTINE DEPENDENCE: ICD-10-CM

## 2023-10-11 DIAGNOSIS — I10 ESSENTIAL (PRIMARY) HYPERTENSION: ICD-10-CM

## 2023-10-11 DIAGNOSIS — N18.2 CHRONIC KIDNEY DISEASE, STAGE 2 (MILD): ICD-10-CM

## 2023-10-11 DIAGNOSIS — R74.8 ABNORMAL LEVELS OF OTHER SERUM ENZYMES: ICD-10-CM

## 2023-10-11 PROCEDURE — 99396 PREV VISIT EST AGE 40-64: CPT

## 2023-10-15 PROBLEM — N18.2 STAGE 2 CHRONIC KIDNEY DISEASE: Status: ACTIVE | Noted: 2021-10-13

## 2023-10-15 PROBLEM — Z87.891 FORMER SMOKER: Status: ACTIVE | Noted: 2018-06-21

## 2023-10-15 PROBLEM — J01.90 ACUTE NON-RECURRENT SINUSITIS, UNSPECIFIED LOCATION: Status: ACTIVE | Noted: 2023-10-15 | Resolved: 2023-11-14

## 2023-10-15 PROBLEM — R74.8 ELEVATED CREATINE KINASE: Status: ACTIVE | Noted: 2021-02-26

## 2023-10-15 PROBLEM — Z00.00 ENCOUNTER FOR ANNUAL PHYSICAL EXAM: Status: ACTIVE | Noted: 2023-10-15

## 2023-10-15 PROBLEM — J45.909 ASTHMA: Status: ACTIVE | Noted: 2020-07-15

## 2023-10-15 PROBLEM — I42.8 CARDIOMYOPATHY, NONISCHEMIC: Status: ACTIVE | Noted: 2018-06-21

## 2023-10-15 PROBLEM — I10 HYPERTENSION, ESSENTIAL: Status: ACTIVE | Noted: 2021-10-13

## 2023-10-15 RX ORDER — MONTELUKAST 10 MG/1
10 TABLET, FILM COATED ORAL
Refills: 0 | Status: ACTIVE | COMMUNITY

## 2023-11-13 ENCOUNTER — RX RENEWAL (OUTPATIENT)
Age: 58
End: 2023-11-13

## 2023-12-21 LAB
ALBUMIN SERPL ELPH-MCNC: 4.4 G/DL
ALP BLD-CCNC: 72 U/L
ALT SERPL-CCNC: 38 U/L
ANION GAP SERPL CALC-SCNC: 12 MMOL/L
AST SERPL-CCNC: 23 U/L
BILIRUB SERPL-MCNC: 0.2 MG/DL
BUN SERPL-MCNC: 25 MG/DL
CALCIUM SERPL-MCNC: 9.4 MG/DL
CHLORIDE SERPL-SCNC: 104 MMOL/L
CHOLEST SERPL-MCNC: 147 MG/DL
CK SERPL-CCNC: 208 U/L
CO2 SERPL-SCNC: 23 MMOL/L
CREAT SERPL-MCNC: 1.33 MG/DL
EGFR: 62 ML/MIN/1.73M2
GLUCOSE SERPL-MCNC: 103 MG/DL
HDLC SERPL-MCNC: 26 MG/DL
LDLC SERPL CALC-MCNC: 64 MG/DL
NONHDLC SERPL-MCNC: 122 MG/DL
POTASSIUM SERPL-SCNC: 4.8 MMOL/L
PROT SERPL-MCNC: 6.8 G/DL
SODIUM SERPL-SCNC: 139 MMOL/L
TRIGL SERPL-MCNC: 370 MG/DL

## 2024-01-12 ENCOUNTER — RX RENEWAL (OUTPATIENT)
Age: 59
End: 2024-01-12

## 2024-01-12 RX ORDER — LOSARTAN POTASSIUM 100 MG/1
100 TABLET, FILM COATED ORAL
Qty: 90 | Refills: 2 | Status: ACTIVE | COMMUNITY
Start: 2018-07-31 | End: 1900-01-01

## 2024-01-12 RX ORDER — BUDESONIDE AND FORMOTEROL FUMARATE DIHYDRATE 160; 4.5 UG/1; UG/1
160-4.5 AEROSOL RESPIRATORY (INHALATION) TWICE DAILY
Qty: 10.2 | Refills: 5 | Status: ACTIVE | COMMUNITY
Start: 2018-10-29 | End: 1900-01-01

## 2024-01-22 ENCOUNTER — APPOINTMENT (OUTPATIENT)
Dept: CARDIOLOGY | Facility: CLINIC | Age: 59
End: 2024-01-22
Payer: COMMERCIAL

## 2024-01-22 PROCEDURE — 93015 CV STRESS TEST SUPVJ I&R: CPT

## 2024-01-26 ENCOUNTER — APPOINTMENT (OUTPATIENT)
Dept: CARDIOLOGY | Facility: CLINIC | Age: 59
End: 2024-01-26

## 2024-01-26 ENCOUNTER — APPOINTMENT (OUTPATIENT)
Dept: CARDIOLOGY | Facility: CLINIC | Age: 59
End: 2024-01-26
Payer: COMMERCIAL

## 2024-01-26 VITALS
HEIGHT: 73 IN | BODY MASS INDEX: 34.33 KG/M2 | WEIGHT: 259 LBS | OXYGEN SATURATION: 96 % | HEART RATE: 76 BPM | SYSTOLIC BLOOD PRESSURE: 146 MMHG | DIASTOLIC BLOOD PRESSURE: 88 MMHG

## 2024-01-26 DIAGNOSIS — R07.89 OTHER CHEST PAIN: ICD-10-CM

## 2024-01-26 DIAGNOSIS — E78.5 HYPERLIPIDEMIA, UNSPECIFIED: ICD-10-CM

## 2024-01-26 PROCEDURE — 99214 OFFICE O/P EST MOD 30 MIN: CPT | Mod: 25

## 2024-01-26 PROCEDURE — G2211 COMPLEX E/M VISIT ADD ON: CPT

## 2024-01-26 PROCEDURE — 93000 ELECTROCARDIOGRAM COMPLETE: CPT

## 2024-01-26 RX ORDER — AMOXICILLIN AND CLAVULANATE POTASSIUM 875; 125 MG/1; MG/1
875-125 TABLET, COATED ORAL
Qty: 20 | Refills: 0 | Status: DISCONTINUED | COMMUNITY
Start: 2023-10-15 | End: 2024-01-26

## 2024-01-26 NOTE — HISTORY OF PRESENT ILLNESS
[FreeTextEntry1] : 57 yo male PMH: HTN, HLD, CAD, NICM/Non obstructive CAD, renal insufficiency who presents to the office today after an abnormal Stress test. Pt reports being asymptomatic without chest pain or shortness of breath. Past visits were reviewed.  Cardiac cath 2018 CC 2018 Non obstructive coronary arteries with severely reduce LV FX EF 30% . Echo in 2021 revealed normal LV EF. Pt has mild renal insufficiency but does not see renal.

## 2024-01-26 NOTE — PHYSICAL EXAM
[Normal S1, S2] : normal S1, S2 [Normal Gait] : normal gait [No Edema] : no edema [Normal] : moves all extremities, no focal deficits, normal speech [de-identified] : Overweight  [Alert and Oriented] : alert and oriented

## 2024-01-26 NOTE — DISCUSSION/SUMMARY
[Hyperlipidemia] : hyperlipidemia [Hypertension] : hypertension [Stable] : stable [Patient] : the patient [FreeTextEntry1] : Pt will have a nuclear stress test to evaluate for ischemia in light of absence of symptoms and CKD.  He will continue current medications Echo 7/2021 reviewed;  NL LV SYS FX EF 69%  EKG SR with NS TW abnormality advised stress test  HLD: continue statin, labs ordered  Obesity: Advised weight reduction/low sodium diet and sleep study ( Patient declines sleep study )   OV 6 weeks   Plan DW patient  [EKG obtained to assist in diagnosis and management of assessed problem(s)] : EKG obtained to assist in diagnosis and management of assessed problem(s)

## 2024-01-31 ENCOUNTER — APPOINTMENT (OUTPATIENT)
Dept: CARDIOLOGY | Facility: CLINIC | Age: 59
End: 2024-01-31
Payer: COMMERCIAL

## 2024-01-31 PROCEDURE — 93306 TTE W/DOPPLER COMPLETE: CPT

## 2024-05-14 ENCOUNTER — RX RENEWAL (OUTPATIENT)
Age: 59
End: 2024-05-14

## 2024-05-14 RX ORDER — ALBUTEROL SULFATE 90 UG/1
108 (90 BASE) INHALANT RESPIRATORY (INHALATION)
Qty: 8.5 | Refills: 5 | Status: ACTIVE | COMMUNITY
Start: 2021-09-23 | End: 1900-01-01

## 2024-05-15 ENCOUNTER — RX RENEWAL (OUTPATIENT)
Age: 59
End: 2024-05-15

## 2024-05-15 RX ORDER — ATORVASTATIN CALCIUM 10 MG/1
10 TABLET, FILM COATED ORAL
Qty: 90 | Refills: 0 | Status: ACTIVE | COMMUNITY
Start: 2018-06-15 | End: 1900-01-01

## 2024-06-09 ENCOUNTER — RX RENEWAL (OUTPATIENT)
Age: 59
End: 2024-06-09

## 2024-06-09 RX ORDER — METOPROLOL SUCCINATE 100 MG/1
100 TABLET, EXTENDED RELEASE ORAL
Qty: 180 | Refills: 3 | Status: ACTIVE | COMMUNITY
Start: 2018-06-15 | End: 1900-01-01

## 2024-06-10 ENCOUNTER — RX RENEWAL (OUTPATIENT)
Age: 59
End: 2024-06-10

## 2024-06-10 RX ORDER — FUROSEMIDE 40 MG/1
40 TABLET ORAL
Qty: 90 | Refills: 3 | Status: ACTIVE | COMMUNITY
Start: 2018-06-15 | End: 1900-01-01

## 2024-06-11 NOTE — PATIENT PROFILE ADULT - [employer]
----- Message from Jennifer Escudero sent at 6/11/2024  9:42 AM CDT -----  Regarding: patient call back  Type: Patient Call Back    Who called: Self     What is the request in detail: asked for a call back to review audiogram     Can the clinic reply by [facility]? No     Would the patient rather a call back or a response via [facility]? Call     Best call back number: .[phone]  
Message sent to Faby English  [facility] Audiology department.  
(4) no limitation

## 2024-07-12 ENCOUNTER — RX RENEWAL (OUTPATIENT)
Age: 59
End: 2024-07-12

## 2024-07-22 NOTE — HISTORY OF PRESENT ILLNESS
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 
89

## 2024-08-06 NOTE — PLAN
Rx Refill Note  Requested Prescriptions     Signed Prescriptions Disp Refills    levothyroxine (SYNTHROID, LEVOTHROID) 75 MCG tablet 90 tablet 1     Sig: TAKE 1 TABLET BY MOUTH EVERY DAY     Authorizing Provider: JULI OVIEDO     Ordering User: OLEG BRYANT      Last office visit with prescribing clinician: 7/17/2024   Last telemedicine visit with prescribing clinician: Visit date not found   Next office visit with prescribing clinician: 8/14/2024                         Would you like a call back once the refill request has been completed: [] Yes [] No    If the office needs to give you a call back, can they leave a voicemail: [] Yes [] No    Oleg Bryant MA  08/06/24, 10:04 EDT  
[FreeTextEntry1] : Mr. Hairston is doing well. Blood work shows that his creatinine has increased to 1.46. He is currently taking losartan 25 mg daily and Lasix 80 mg every other day alternating with 40 mg every other day. Patient will continue on losartan and decrease Lasix to 40 mg daily with repeat basic metabolic profile 2 weeks to follow creatinine. I discussed this with Dr. Ruiz.

## 2024-08-09 ENCOUNTER — RX RENEWAL (OUTPATIENT)
Age: 59
End: 2024-08-09

## 2024-08-12 ENCOUNTER — RX RENEWAL (OUTPATIENT)
Age: 59
End: 2024-08-12

## 2024-11-05 ENCOUNTER — RX RENEWAL (OUTPATIENT)
Age: 59
End: 2024-11-05

## 2024-12-30 ENCOUNTER — RX RENEWAL (OUTPATIENT)
Age: 59
End: 2024-12-30

## 2025-02-20 ENCOUNTER — RX RENEWAL (OUTPATIENT)
Age: 60
End: 2025-02-20

## 2025-04-22 ENCOUNTER — RX RENEWAL (OUTPATIENT)
Age: 60
End: 2025-04-22

## 2025-06-30 NOTE — CONSULT NOTE ADULT - CONSULT REQUESTED DATE/TIME
Last sent:06/13/2025  LOV: 05/14/2025  RTC:not on file     Ortho referral ordered and message sent    14-Jun-2018 08:31

## 2025-07-22 ENCOUNTER — RX RENEWAL (OUTPATIENT)
Age: 60
End: 2025-07-22

## 2025-08-12 ENCOUNTER — RX RENEWAL (OUTPATIENT)
Age: 60
End: 2025-08-12

## 2025-09-15 ENCOUNTER — RX RENEWAL (OUTPATIENT)
Age: 60
End: 2025-09-15